# Patient Record
Sex: FEMALE | Race: WHITE | NOT HISPANIC OR LATINO | Employment: FULL TIME | URBAN - NONMETROPOLITAN AREA
[De-identification: names, ages, dates, MRNs, and addresses within clinical notes are randomized per-mention and may not be internally consistent; named-entity substitution may affect disease eponyms.]

---

## 2018-08-01 ENCOUNTER — TELEPHONE (OUTPATIENT)
Dept: OBGYN | Facility: OTHER | Age: 28
End: 2018-08-01

## 2018-08-01 DIAGNOSIS — O36.80X0 ENCOUNTER TO DETERMINE FETAL VIABILITY OF PREGNANCY, SINGLE OR UNSPECIFIED FETUS: Primary | ICD-10-CM

## 2018-08-17 ENCOUNTER — HOSPITAL ENCOUNTER (OUTPATIENT)
Dept: ULTRASOUND IMAGING | Facility: OTHER | Age: 28
Discharge: HOME | End: 2018-08-17
Attending: OBSTETRICS & GYNECOLOGY | Admitting: OBSTETRICS & GYNECOLOGY
Payer: COMMERCIAL

## 2018-08-17 ENCOUNTER — PRENATAL OFFICE VISIT (OUTPATIENT)
Dept: OBGYN | Facility: OTHER | Age: 28
End: 2018-08-17
Attending: OBSTETRICS & GYNECOLOGY
Payer: COMMERCIAL

## 2018-08-17 VITALS
BODY MASS INDEX: 20.46 KG/M2 | HEIGHT: 63 IN | SYSTOLIC BLOOD PRESSURE: 100 MMHG | HEART RATE: 68 BPM | DIASTOLIC BLOOD PRESSURE: 64 MMHG | WEIGHT: 115.5 LBS

## 2018-08-17 DIAGNOSIS — Z34.01 ENCOUNTER FOR SUPERVISION OF NORMAL FIRST PREGNANCY IN FIRST TRIMESTER: Primary | ICD-10-CM

## 2018-08-17 DIAGNOSIS — O36.80X0 ENCOUNTER TO DETERMINE FETAL VIABILITY OF PREGNANCY, SINGLE OR UNSPECIFIED FETUS: ICD-10-CM

## 2018-08-17 LAB
ABO + RH BLD: NORMAL
ABO + RH BLD: NORMAL
BLD GP AB SCN SERPL QL: NORMAL
BLOOD BANK CMNT PATIENT-IMP: NORMAL
C TRACH DNA SPEC QL PROBE+SIG AMP: NOT DETECTED
ERYTHROCYTE [DISTWIDTH] IN BLOOD BY AUTOMATED COUNT: 12.2 % (ref 10–15)
HCT VFR BLD AUTO: 43.6 % (ref 35–47)
HGB BLD-MCNC: 15.4 G/DL (ref 11.7–15.7)
MCH RBC QN AUTO: 31.6 PG (ref 26.5–33)
MCHC RBC AUTO-ENTMCNC: 35.3 G/DL (ref 31.5–36.5)
MCV RBC AUTO: 89 FL (ref 78–100)
N GONORRHOEA DNA SPEC QL PROBE+SIG AMP: NOT DETECTED
PLATELET # BLD AUTO: 236 10E9/L (ref 150–450)
RBC # BLD AUTO: 4.88 10E12/L (ref 3.8–5.2)
SPECIMEN EXP DATE BLD: NORMAL
SPECIMEN SOURCE: NORMAL
WBC # BLD AUTO: 10.7 10E9/L (ref 4–11)

## 2018-08-17 PROCEDURE — 36415 COLL VENOUS BLD VENIPUNCTURE: CPT | Performed by: OBSTETRICS & GYNECOLOGY

## 2018-08-17 PROCEDURE — 87086 URINE CULTURE/COLONY COUNT: CPT | Performed by: OBSTETRICS & GYNECOLOGY

## 2018-08-17 PROCEDURE — 86780 TREPONEMA PALLIDUM: CPT | Mod: XU | Performed by: OBSTETRICS & GYNECOLOGY

## 2018-08-17 PROCEDURE — 85027 COMPLETE CBC AUTOMATED: CPT | Performed by: OBSTETRICS & GYNECOLOGY

## 2018-08-17 PROCEDURE — 86850 RBC ANTIBODY SCREEN: CPT | Performed by: OBSTETRICS & GYNECOLOGY

## 2018-08-17 PROCEDURE — 86762 RUBELLA ANTIBODY: CPT | Performed by: OBSTETRICS & GYNECOLOGY

## 2018-08-17 PROCEDURE — 86780 TREPONEMA PALLIDUM: CPT | Performed by: OBSTETRICS & GYNECOLOGY

## 2018-08-17 PROCEDURE — 86901 BLOOD TYPING SEROLOGIC RH(D): CPT | Performed by: OBSTETRICS & GYNECOLOGY

## 2018-08-17 PROCEDURE — 87340 HEPATITIS B SURFACE AG IA: CPT | Performed by: OBSTETRICS & GYNECOLOGY

## 2018-08-17 PROCEDURE — 87591 N.GONORRHOEAE DNA AMP PROB: CPT | Performed by: OBSTETRICS & GYNECOLOGY

## 2018-08-17 PROCEDURE — 99207 ZZC OB VISIT-NO CHARGE - GICH ONLY: CPT | Performed by: OBSTETRICS & GYNECOLOGY

## 2018-08-17 PROCEDURE — 86592 SYPHILIS TEST NON-TREP QUAL: CPT | Performed by: OBSTETRICS & GYNECOLOGY

## 2018-08-17 PROCEDURE — 87491 CHLMYD TRACH DNA AMP PROBE: CPT | Performed by: OBSTETRICS & GYNECOLOGY

## 2018-08-17 PROCEDURE — 76801 OB US < 14 WKS SINGLE FETUS: CPT

## 2018-08-17 PROCEDURE — 86900 BLOOD TYPING SEROLOGIC ABO: CPT | Performed by: OBSTETRICS & GYNECOLOGY

## 2018-08-17 PROCEDURE — 87389 HIV-1 AG W/HIV-1&-2 AB AG IA: CPT | Performed by: OBSTETRICS & GYNECOLOGY

## 2018-08-17 RX ORDER — ONDANSETRON 4 MG/1
4 TABLET, ORALLY DISINTEGRATING ORAL EVERY 8 HOURS PRN
COMMUNITY
Start: 2018-08-06 | End: 2018-08-29

## 2018-08-17 RX ORDER — PYRIDOXINE HCL (VITAMIN B6) 25 MG
25 TABLET ORAL EVERY MORNING
Status: ON HOLD | COMMUNITY
Start: 2018-08-17 | End: 2019-03-31

## 2018-08-17 ASSESSMENT — PAIN SCALES - GENERAL: PAINLEVEL: NO PAIN (0)

## 2018-08-17 NOTE — NURSING NOTE
"Patient presents today for her OB Px. Her LMP was 6/18/18.  Melany Mac LPN  8/17/2018  2:20 PM           Chief Complaint   Patient presents with     Prenatal Care     OB Px LMP 6/18/18       Initial /64 (BP Location: Right arm, Patient Position: Sitting, Cuff Size: Adult Regular)  Pulse 68  Ht 1.6 m (5' 3\")  Wt 52.4 kg (115 lb 8 oz)  LMP 06/18/2018  BMI 20.46 kg/m2 Estimated body mass index is 20.46 kg/(m^2) as calculated from the following:    Height as of this encounter: 1.6 m (5' 3\").    Weight as of this encounter: 52.4 kg (115 lb 8 oz).  Medication Reconciliation: complete    Melany Mac LPN    "

## 2018-08-17 NOTE — PROGRESS NOTES
New Obstetrics Visit    HPI: 27 year old  at 7w3d by 7w3d US here today for initial OB visit. This was a somewhat planned pregnancy. She had a positive pregnancy test in , then 3 days later had a negative test and started having cramping and bleeding. She had no menses after. She has been nauseated for the past week. She is taking unisom at night, B6 once in the morning, and zofran prn. The zofran is making her constipated. She denies cramping or VB.     OBHx  Obstetric History       T0      L0     SAB0   TAB0   Ectopic0   Multiple0   Live Births0       # Outcome Date GA Lbr Cesar/2nd Weight Sex Delivery Anes PTL Lv   1 Current                   GYN History  Last pap in , reportedly normal  Last annual exam with breast exam 2017- normal    PMHx: History reviewed. No pertinent past medical history.   PSHx:   Past Surgical History:   Procedure Laterality Date     BUNIONECTOMY       SINUS SURGERY        Meds:   Current Outpatient Prescriptions   Medication     doxylamine (UNISOM) 25 MG TABS tablet     ondansetron (ZOFRAN-ODT) 4 MG ODT tab     Prenatal Vit-Fe Fumarate-FA (GNP PRENATAL VITAMINS) 28-0.8 MG TABS     pyridOXINE (VITAMIN  B-6) 25 MG tablet     No current facility-administered medications for this visit.      Allergies:     Allergies   Allergen Reactions     Quasense Itching       SocHx:   Social History   Substance Use Topics     Smoking status: Never Smoker     Smokeless tobacco: Never Used     Alcohol use No     Lives with her  Will. 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 Is finishing her bachelors in nursing in October.    FamHx:   Family History   Problem Relation Age of Onset     Other - See Comments Mother      colitis     Breast Cancer Maternal Grandmother      late 50s        ROS: 10-Point ROS negative except as noted in HPI      Physical Exam  /64 (BP Location: Right arm, Patient Position: Sitting, Cuff Size: Adult  "Regular)  Pulse 68  Ht 1.6 m (5' 3\")  Wt 52.4 kg (115 lb 8 oz)  LMP 2018  BMI 20.46 kg/m2  Body mass index is 20.46 kg/(m^2).  Gen: Well-appearing, NAD  HEENT: Normocephalic, atraumatic  Neck: Thyroid is not enlarged, no appreciable masses palpated. Non-tender  CV:  RRR, no m/r/g auscultated  Pulm: CTAB, no w/r/r auscultated  Abd: Soft, non-tender, non-distended  Ext: No LE edema, extremities warm and well perfused    Pelvic:  Normal appearing external female genitalia. No vaginal lesions. Minimal white discharge. Cervix normal, no lesions. Uterus is 8wk size, mobile, non-tender, anteverted. No adnexal tenderness or masses    Assessment/Plan:  Ms. Gerda Hill is a 27 year old  at 7w3d by 7w3d US, here for new OB visit.  1. Nausea, vomiting: discussed lifestyle modifications, unisom, B6, zofran. Offered Reglan, declines for now but will call if she needs something additional  2. Constipation: discussed miralax, colace  3. New OB labs ord'd today  4. Genetics: discussed options today, they are undecided. Will review at next visit    Follow up in 4 weeks.    Lizeth Langston MD  OB/GYN  2018 3:10 PM     "

## 2018-08-17 NOTE — MR AVS SNAPSHOT
"              After Visit Summary   8/17/2018    Gerda Hill    MRN: [id number]           Patient Information     Date Of Birth          1990        Visit Information        Provider Department      8/17/2018 3:00 PM Lizeth Langston MD [facility]        Today's Diagnoses     Encounter for supervision of normal first pregnancy in first trimester    -  1       Follow-ups after your visit        Your next 10 appointments already scheduled     Sep 11, 2018  9:00 AM CDT   ESTABLISHED PRENATAL with Lizeth Langston MD   [facility]    [address]  [place] [address] [phone]              Who to contact     If you have questions or need follow up information about today's clinic visit or your schedule please contact [facility] directly at [phone].  Normal or non-critical lab and imaging results will be communicated to you by [employer], letter or phone within 4 business days after the clinic has received the results. If you do not hear from us within 7 days, please contact the clinic through [employer] or phone. If you have a critical or abnormal lab result, we will notify you by phone as soon as possible.  Submit refill requests through [employer] or call your pharmacy and they will forward the refill request to us. Please allow 3 business days for your refill to be completed.          Additional Information About Your Visit        MyChart Information     [employer] lets you send messages to your doctor, view your test results, renew your prescriptions, schedule appointments and more. To sign up, go to [email or web address] . Click on \"Log in\" on the left side of the screen, which will take you to the Welcome page. Then click on \"Sign up Now\" on the right side of the page.     You will be asked to enter the access code listed below, as well as some personal information. Please follow the " "directions to create your username and password.     Your access code is: [email or web address]  Expires: 11/15/2018  1:20 PM     Your access code will  in 90 days. If you need help or a new code, please call your [facility] clinic or [phone].        Care EveryWhere ID     This is your Care EveryWhere ID. This could be used by other organizations to access your [facility] medical records  [id number]        Your Vitals Were     Pulse Height Last Period BMI (Body Mass Index)          68 1.6 m (5' 3\") 2018 20.46 kg/m2         Blood Pressure from Last 3 Encounters:   18 100/64    Weight from Last 3 Encounters:   18 52.4 kg (115 lb 8 oz)              We Performed the Following     ABO/Rh type and screen     CBC with platelets     GC/Chlamydia by PCR - HI,GH     Hepatitis B surface antigen     HIV Antigen Antibody Combo     Rubella Antibody IgG Quantitative     Treponema Abs w Reflex to RPR and Titer     Urine Culture Aerobic Bacterial        Primary Care Provider Fax #    Physician No Ref-Primary [phone]       No address on file        Equal Access to Services     TYLER BARKSDALE : Hadii edenilson ku yani Sofia, waaxda luluke, qaybta kaalmamargaret sainz, raad ash . So [facility] [phone].    ATENCIÓN: Si habla español, tiene a sheets disposición servicios gratuitos de asistencia lingüística. Llame al [phone].    We comply with applicable federal civil rights laws and [place] laws. We do not discriminate on the basis of race, color, national origin, age, disability, sex, sexual orientation, or gender identity.            Thank you!     Thank you for choosing [facility] AND [facility]  for your care. Our goal is always to provide you with excellent care. Hearing back from our patients is one way we can continue to improve our services. Please take a few minutes to complete the written survey that you may receive in the mail after your visit with us. Thank you!   "           Your Updated Medication List - Protect others around you: Learn how to safely use, store and throw away your medicines at www.disposemymeds.org.          This list is accurate as of 8/17/18  3:33 PM.  Always use your most recent med list.                   Brand Name Dispense Instructions for use Diagnosis    GNP PRENATAL VITAMINS 28-0.8 MG Tabs      Take 1 tablet by mouth daily        ondansetron 4 MG ODT tab    ZOFRAN-ODT     Place 4 mg under the tongue every 8 hours as needed        pyridOXINE 25 MG tablet    vitamin B-6     Take 1 tablet (25 mg) by mouth every morning        UNISOM 25 MG Tabs tablet   Generic drug:  doxylamine      Take 1 tablet (25 mg) by mouth At Bedtime

## 2018-08-18 ASSESSMENT — PATIENT HEALTH QUESTIONNAIRE - PHQ9: SUM OF ALL RESPONSES TO PHQ QUESTIONS 1-9: 0

## 2018-08-20 LAB
BACTERIA SPEC CULT: NORMAL
SPECIMEN SOURCE: NORMAL

## 2018-08-21 LAB
HBV SURFACE AG SERPL QL IA: NONREACTIVE
HIV 1+2 AB+HIV1 P24 AG SERPL QL IA: NONREACTIVE
RPR SER QL: NONREACTIVE
RUBV IGG SERPL IA-ACNC: 41 IU/ML
T PALLIDUM AB SER QL: ABNORMAL

## 2018-08-22 ENCOUNTER — MYC MEDICAL ADVICE (OUTPATIENT)
Dept: OBGYN | Facility: OTHER | Age: 28
End: 2018-08-22

## 2018-08-22 DIAGNOSIS — O21.9 NAUSEA/VOMITING IN PREGNANCY: Primary | ICD-10-CM

## 2018-08-22 LAB — T PALLIDUM AB SER QL AGGL: NON REACTIVE

## 2018-08-22 RX ORDER — ONDANSETRON 4 MG/1
4 TABLET, FILM COATED ORAL EVERY 6 HOURS PRN
Qty: 30 TABLET | Refills: 1 | Status: SHIPPED | OUTPATIENT
Start: 2018-08-22 | End: 2019-05-10

## 2018-08-29 ENCOUNTER — OFFICE VISIT (OUTPATIENT)
Dept: FAMILY MEDICINE | Facility: OTHER | Age: 28
End: 2018-08-29
Attending: NURSE PRACTITIONER
Payer: COMMERCIAL

## 2018-08-29 ENCOUNTER — PRENATAL OFFICE VISIT (OUTPATIENT)
Dept: OBGYN | Facility: OTHER | Age: 28
End: 2018-08-29
Attending: OBSTETRICS & GYNECOLOGY
Payer: COMMERCIAL

## 2018-08-29 VITALS
DIASTOLIC BLOOD PRESSURE: 80 MMHG | SYSTOLIC BLOOD PRESSURE: 100 MMHG | BODY MASS INDEX: 20.24 KG/M2 | WEIGHT: 114.25 LBS | HEART RATE: 88 BPM

## 2018-08-29 VITALS
WEIGHT: 116 LBS | SYSTOLIC BLOOD PRESSURE: 100 MMHG | TEMPERATURE: 96.7 F | BODY MASS INDEX: 20.55 KG/M2 | HEART RATE: 88 BPM | DIASTOLIC BLOOD PRESSURE: 80 MMHG

## 2018-08-29 DIAGNOSIS — O21.9 NAUSEA/VOMITING IN PREGNANCY: ICD-10-CM

## 2018-08-29 DIAGNOSIS — R30.0 DYSURIA: Primary | ICD-10-CM

## 2018-08-29 DIAGNOSIS — O21.9 NAUSEA AND VOMITING IN PREGNANCY: Primary | ICD-10-CM

## 2018-08-29 LAB
ALBUMIN UR-MCNC: NEGATIVE MG/DL
APPEARANCE UR: CLEAR
BACTERIA #/AREA URNS HPF: ABNORMAL /HPF
BILIRUB UR QL STRIP: NEGATIVE
COLOR UR AUTO: YELLOW
GLUCOSE UR STRIP-MCNC: 100 MG/DL
HGB UR QL STRIP: NEGATIVE
KETONES UR STRIP-MCNC: NEGATIVE MG/DL
LEUKOCYTE ESTERASE UR QL STRIP: NEGATIVE
NITRATE UR QL: NEGATIVE
NON-SQ EPI CELLS #/AREA URNS LPF: ABNORMAL /LPF
PH UR STRIP: 6 PH (ref 5–9)
RBC #/AREA URNS AUTO: ABNORMAL /HPF
SOURCE: ABNORMAL
SP GR UR STRIP: 1.02 (ref 1–1.03)
UROBILINOGEN UR STRIP-ACNC: 0.2 EU/DL (ref 0.2–1)
WBC #/AREA URNS AUTO: ABNORMAL /HPF

## 2018-08-29 PROCEDURE — 99207 ZZC OB VISIT-NO CHARGE - GICH ONLY: CPT | Performed by: OBSTETRICS & GYNECOLOGY

## 2018-08-29 PROCEDURE — 99213 OFFICE O/P EST LOW 20 MIN: CPT | Performed by: NURSE PRACTITIONER

## 2018-08-29 PROCEDURE — 81001 URINALYSIS AUTO W/SCOPE: CPT | Performed by: NURSE PRACTITIONER

## 2018-08-29 RX ORDER — METOCLOPRAMIDE 10 MG/1
10 TABLET ORAL EVERY 6 HOURS PRN
Qty: 30 TABLET | Refills: 1 | Status: SHIPPED | OUTPATIENT
Start: 2018-08-29 | End: 2018-09-11

## 2018-08-29 RX ORDER — PROMETHAZINE HYDROCHLORIDE 25 MG/1
25 SUPPOSITORY RECTAL EVERY 12 HOURS PRN
Qty: 20 SUPPOSITORY | Refills: 1 | Status: ON HOLD | OUTPATIENT
Start: 2018-08-29 | End: 2019-03-31

## 2018-08-29 ASSESSMENT — ENCOUNTER SYMPTOMS
FREQUENCY: 1
NAUSEA: 1
VOMITING: 1

## 2018-08-29 ASSESSMENT — PAIN SCALES - GENERAL
PAINLEVEL: NO PAIN (0)
PAINLEVEL: NO PAIN (0)

## 2018-08-29 NOTE — NURSING NOTE
Patient is here today with c/o frequency and cloudy urine. She said her symptoms started about 2 days ago. Juanita Landon LPN......................8/29/2018 9:51 AM

## 2018-08-29 NOTE — MR AVS SNAPSHOT
After Visit Summary   8/29/2018    Gerda Hill    MRN: [id number]           Patient Information     Date Of Birth          1990        Visit Information        Provider Department      8/29/2018 9:45 AM Cindy Tomas APRN CNP [facility]        Today's Diagnoses     Dysuria    -  1    Nausea/vomiting in pregnancy           Follow-ups after your visit        Follow-up notes from your care team     Return if symptoms worsen or fail to improve.      Your next 10 appointments already scheduled     Sep 05, 2018  9:30 AM CDT   ESTABLISHED PRENATAL with Lizeth Langston MD   [facility]    [address]  [place] [address]   [phone]            Sep 11, 2018  9:00 AM CDT   ESTABLISHED PRENATAL with Lizeth Langston MD   [facility]    [address]  [place] [address]   [phone]              Who to contact     If you have questions or need follow up information about today's clinic visit or your schedule please contact [facility] directly at [phone].  Normal or non-critical lab and imaging results will be communicated to you by [employer], letter or phone within 4 business days after the clinic has received the results. If you do not hear from us within 7 days, please contact the clinic through [employer] or phone. If you have a critical or abnormal lab result, we will notify you by phone as soon as possible.  Submit refill requests through [employer] or call your pharmacy and they will forward the refill request to us. Please allow 3 business days for your refill to be completed.          Additional Information About Your Visit        [employer] Information     [employer] gives you secure access to your electronic health record. If you see a primary care provider, you can also send messages to your care team and  make appointments. If you have questions, please call your primary care clinic.  If you do not have a primary care provider, please call [phone] and they will assist you.        Care EveryWhere ID     This is your Care EveryWhere ID. This could be used by other organizations to access your [facility] medical records  [id number]        Your Vitals Were     Pulse Temperature Last Period BMI (Body Mass Index)          88 96.7  F (35.9  C) (Temporal) 06/18/2018 20.55 kg/m2         Blood Pressure from Last 3 Encounters:   08/29/18 100/80   08/29/18 100/80   08/17/18 100/64    Weight from Last 3 Encounters:   08/29/18 114 lb 4 oz (51.8 kg)   08/29/18 116 lb (52.6 kg)   08/17/18 115 lb 8 oz (52.4 kg)              We Performed the Following     *UA reflex to Microscopic     Urine Microscopic          Today's Medication Changes          These changes are accurate as of 8/29/18  6:45 PM.  If you have any questions, ask your nurse or doctor.               Start taking these medicines.        Dose/Directions    metoclopramide 10 MG tablet   Commonly known as:  REGLAN   Used for:  Nausea and vomiting in pregnancy   Started by:  Lizeth Langston MD        Dose:  10 mg   Take 1 tablet (10 mg) by mouth every 6 hours as needed   Quantity:  30 tablet   Refills:  1       promethazine 25 MG Suppository   Commonly known as:  PHENERGAN   Used for:  Nausea and vomiting in pregnancy   Started by:  Lizeth Langston MD        Dose:  25 mg   Place 1 suppository (25 mg) rectally every 12 hours as needed for nausea   Quantity:  20 suppository   Refills:  1       ranitidine 150 MG tablet   Commonly known as:  ZANTAC   Used for:  Nausea and vomiting in pregnancy   Started by:  Lizeth Langston MD        Dose:  150 mg   Take 1 tablet (150 mg) by mouth 2 times daily   Quantity:  100 tablet   Refills:  3            Where to get your medicines      [facility] [address] [place] [address]  ST [address]  [address],  [facility] [address]     Phone:  [phone]     metoclopramide 10 MG tablet    promethazine 25 MG Suppository    ranitidine 150 MG tablet                Primary Care Provider Fax #    Physician No Ref-Primary [phone]       No address on file        Equal Access to Services     TYRELL BARKSDALE : Hadii aad ku hadasho Soomaali, waaxda luqadaha, qaybta kaalmada adeegyada, raad thakkarin hayaan adedenise kharash laeber . So [facility] [phone].    ATENCIÓN: Si habla español, tiene a sheets disposición servicios gratuitos de asistencia lingüística. Llame al [phone].    We comply with applicable federal civil rights laws and [place] laws. We do not discriminate on the basis of race, color, national origin, age, disability, sex, sexual orientation, or gender identity.            Thank you!     Thank you for choosing [facility] AND [facility]  for your care. Our goal is always to provide you with excellent care. Hearing back from our patients is one way we can continue to improve our services. Please take a few minutes to complete the written survey that you may receive in the mail after your visit with us. Thank you!             Your Updated Medication List - Protect others around you: Learn how to safely use, store and throw away your medicines at www.disposemymeds.org.          This list is accurate as of 8/29/18  6:45 PM.  Always use your most recent med list.                   Brand Name Dispense Instructions for use Diagnosis    GNP PRENATAL VITAMINS 28-0.8 MG Tabs      Take 1 tablet by mouth daily        metoclopramide 10 MG tablet    REGLAN    30 tablet    Take 1 tablet (10 mg) by mouth every 6 hours as needed    Nausea and vomiting in pregnancy       ondansetron 4 MG tablet    ZOFRAN    30 tablet    Take 1 tablet (4 mg) by mouth every 6 hours as needed for nausea    Nausea/vomiting in pregnancy       promethazine 25 MG Suppository    PHENERGAN    20 suppository     Place 1 suppository (25 mg) rectally every 12 hours as needed for nausea    Nausea and vomiting in pregnancy       pyridOXINE 25 MG tablet    vitamin B-6     Take 1 tablet (25 mg) by mouth every morning        ranitidine 150 MG tablet    ZANTAC    100 tablet    Take 1 tablet (150 mg) by mouth 2 times daily    Nausea and vomiting in pregnancy       UNISOM 25 MG Tabs tablet   Generic drug:  doxylamine      Take 1 tablet (25 mg) by mouth At Bedtime

## 2018-08-29 NOTE — PROGRESS NOTES
Return OB Visit    S: Patient presents today for an acute visit for worsening nausea and vomiting. She was last seen 2 weeks ago, and reports that since that time her symptoms are getting much worse. She is taking zofran twice a day but doesn't feel like it is helping. She is vomiting a few times per day but is always nauseated. She is able to tolerate some fluids. Has been eating mostly pedialyte popsicles, which are going okay. She is not eating much. She has noticed her mood is getting worse because of how ill she feels.    O: /80 (BP Location: Right arm, Patient Position: Sitting, Cuff Size: Adult Regular)  Pulse 88  Wt 51.8 kg (114 lb 4 oz)  LMP 2018  BMI 20.24 kg/m2  Gen: Ill appearing but NAD  See OB Flowsheet    A/P:  Gerda Hill is a 27 year old  at 9w1d by 7w3d US, here for nausea and vomiting in pregnancy.  Nausea/vomiting: weight fairly stable and vitals all normal. discussed continuing zofran, B6 and unisom, adding phenergan suppositories and reglan. Discussed addition of IV infusions if not improving in the next week.     PNC: Rh positive, Rubella immune  Genetics: undecided, will review at future appointment  Imaging: dating US at 7w3d  Immunizations: none  RTC 1 week for symptom recheck    Lizeth Langston MD  OB/GYN  2018 12:38 PM

## 2018-08-29 NOTE — PROGRESS NOTES
SUBJECTIVE:   Gerda Hill is a 27 year old female who presents to clinic today for the following health issues:    Presents for a couple of days of urinary frequency, denies any dysuria, hematuria, abdominal pain or flank pain--denies any vaginal discharge--- does not feel like past bladder infections  Feels the frequency may be due to her pregnancy  Has been trying to take small amounts of fluids regularly however has persistent nausea, 9 weeks gestation  Was seen a couple of weeks ago by her OB/GYN given a prescription for Zofran which she feels is minimally effective has been taking it at least twice a day regularly--feels she has lost a couple of pounds  On her scale at home  Has become depressed with the nausea--trying Zofran, vitamin B6 does not feel this is working  Feels nauseous all the time    HPI    There is no problem list on file for this patient.    Past Surgical History:   Procedure Laterality Date     BUNIONECTOMY       SINUS SURGERY         Social History   Substance Use Topics     Smoking status: Never Smoker     Smokeless tobacco: Never Used     Alcohol use No     Family History   Problem Relation Age of Onset     Other - See Comments Mother      colitis     Breast Cancer Maternal Grandmother      late 50s         Current Outpatient Prescriptions   Medication Sig Dispense Refill     doxylamine (UNISOM) 25 MG TABS tablet Take 1 tablet (25 mg) by mouth At Bedtime       ondansetron (ZOFRAN) 4 MG tablet Take 1 tablet (4 mg) by mouth every 6 hours as needed for nausea 30 tablet 1     Prenatal Vit-Fe Fumarate-FA (GNP PRENATAL VITAMINS) 28-0.8 MG TABS Take 1 tablet by mouth daily       pyridOXINE (VITAMIN  B-6) 25 MG tablet Take 1 tablet (25 mg) by mouth every morning       metoclopramide (REGLAN) 10 MG tablet Take 1 tablet (10 mg) by mouth every 6 hours as needed 30 tablet 1     promethazine (PHENERGAN) 25 MG Suppository Place 1 suppository (25 mg) rectally every 12 hours as needed for nausea 20  suppository 1     ranitidine (ZANTAC) 150 MG tablet Take 1 tablet (150 mg) by mouth 2 times daily 100 tablet 3     Allergies   Allergen Reactions     Quasense Itching     BP Readings from Last 3 Encounters:   08/29/18 100/80   08/29/18 100/80   08/17/18 100/64    Wt Readings from Last 3 Encounters:   08/29/18 114 lb 4 oz (51.8 kg)   08/29/18 116 lb (52.6 kg)   08/17/18 115 lb 8 oz (52.4 kg)                  Labs reviewed in EPIC    Review of Systems   Gastrointestinal: Positive for nausea and vomiting.   Genitourinary: Positive for frequency.   All other systems reviewed and are negative.       OBJECTIVE:     /80 (BP Location: Right arm, Patient Position: Sitting, Cuff Size: Adult Regular)  Pulse 88  Temp 96.7  F (35.9  C) (Temporal)  Wt 116 lb (52.6 kg)  LMP 06/18/2018  BMI 20.55 kg/m2  Body mass index is 20.55 kg/(m^2).  Physical Exam   Constitutional: She is oriented to person, place, and time. She appears well-developed. No distress.   Cardiovascular: Normal rate.    Pulmonary/Chest: Effort normal.   Musculoskeletal: Normal range of motion.   Neurological: She is alert and oriented to person, place, and time.   Skin: Skin is warm and dry.   Psychiatric: Her behavior is normal. Judgment and thought content normal.   Depressed crying intermittently   Nursing note and vitals reviewed.      Results for orders placed or performed in visit on 08/29/18   *UA reflex to Microscopic   Result Value Ref Range    Color Urine Yellow     Appearance Urine Clear     Glucose Urine 100 (A) NEG^Negative mg/dL    Bilirubin Urine Negative NEG^Negative    Ketones Urine Negative NEG^Negative mg/dL    Specific Gravity Urine 1.025 1.000 - 1.030    Blood Urine Negative NEG^Negative    pH Urine 6.0 5.0 - 9.0 pH    Protein Albumin Urine Negative NEG^Negative mg/dL    Urobilinogen Urine 0.2 0.2 - 1.0 EU/dL    Nitrite Urine Negative NEG^Negative    Leukocyte Esterase Urine Negative NEG^Negative    Source Midstream Urine    Urine  Microscopic   Result Value Ref Range    WBC Urine 0 - 5 OTO5^0 - 5 /HPF    RBC Urine O - 2 OTO2^O - 2 /HPF    Squamous Epithelial /LPF Urine Few FEW^Few /LPF    Bacteria Urine Moderate (A) NEG^Negative /HPF         ASSESSMENT/PLAN:     Reviewed UA results no apparent evidence of UTI--- only symptom is frequency  Likely due to pregnancy    Mood generally depressed from chronic persistent nausea    Transferred over to OB GYN primary for follow-up today    1. Dysuria    - *UA reflex to Microscopic  - Urine Microscopic    2. Nausea/vomiting in pregnancy          RAMIRO Espinosa [facility] [place]

## 2018-08-29 NOTE — MR AVS SNAPSHOT
After Visit Summary   8/29/2018    Gerda Hill    MRN: [id number]           Patient Information     Date Of Birth          1990        Visit Information        Provider Department      8/29/2018 10:30 AM Lizeth Langston MD         Today's Diagnoses     Nausea and vomiting in pregnancy    -  1       Follow-ups after your visit        Your next 10 appointments already scheduled     Sep 05, 2018  9:30 AM CDT   ESTABLISHED PRENATAL with Lizeth Langston MD    ()    [address]  [place] [address]   [phone]            Sep 11, 2018  9:00 AM CDT   ESTABLISHED PRENATAL with Lizeth Langston MD    ()    [address]  [place] [address]   [phone]              Who to contact     If you have questions or need follow up information about today's clinic visit or your schedule please contact [facility] directly at [phone].  Normal or non-critical lab and imaging results will be communicated to you by [employer], letter or phone within 4 business days after the clinic has received the results. If you do not hear from us within 7 days, please contact the clinic through Mobii or phone. If you have a critical or abnormal lab result, we will notify you by phone as soon as possible.  Submit refill requests through Mobii or call your pharmacy and they will forward the refill request to us. Please allow 3 business days for your refill to be completed.          Additional Information About Your Visit        [employer] Information     Mobii gives you secure access to your electronic health record. If you see a primary care provider, you can also send messages to your care team and make appointments. If you have questions, please call your primary care clinic.  If you do not have a  primary care provider, please call [phone] and they will assist you.        Care EveryWhere ID     This is your Care EveryWhere ID. This could be used by other organizations to access your [facility] medical records  [id number]        Your Vitals Were     Pulse Last Period BMI (Body Mass Index)             88 06/18/2018 20.24 kg/m2          Blood Pressure from Last 3 Encounters:   08/29/18 100/80   08/29/18 100/80   08/17/18 100/64    Weight from Last 3 Encounters:   08/29/18 51.8 kg (114 lb 4 oz)   08/29/18 52.6 kg (116 lb)   08/17/18 52.4 kg (115 lb 8 oz)              Today, you had the following     No orders found for display         Today's Medication Changes          These changes are accurate as of 8/29/18 11:08 AM.  If you have any questions, ask your nurse or doctor.               Start taking these medicines.        Dose/Directions    metoclopramide 10 MG tablet   Commonly known as:  REGLAN   Used for:  Nausea and vomiting in pregnancy   Started by:  Lizeth Langston MD        Dose:  10 mg   Take 1 tablet (10 mg) by mouth every 6 hours as needed   Quantity:  30 tablet   Refills:  1       promethazine 25 MG Suppository   Commonly known as:  PHENERGAN   Used for:  Nausea and vomiting in pregnancy   Started by:  Lizeth Langston MD        Dose:  25 mg   Place 1 suppository (25 mg) rectally every 12 hours as needed for nausea   Quantity:  20 suppository   Refills:  1       ranitidine 150 MG tablet   Commonly known as:  ZANTAC   Used for:  Nausea and vomiting in pregnancy   Started by:  Lizeth Langston MD        Dose:  150 mg   Take 1 tablet (150 mg) by mouth 2 times daily   Quantity:  100 tablet   Refills:  3            Where to get your medicines      These medications were sent to [id number] - [place], [place] - [address] OF  [address], [facility] [address]     Phone:  [phone]     metoclopramide 10 MG tablet    promethazine 25 MG  Suppository    ranitidine 150 MG tablet                Primary Care Provider Fax #    Physician No Ref-Primary [phone]       No address on file        Equal Access to Services     TYRELL BARKSDALE : Hadii aad ku hadblair Sofia, jocelyn eleonoraartie, chao sainz, raad vincent. So [facility] [phone].    ATENCIÓN: Si habla español, tiene a sheets disposición servicios gratuitos de asistencia lingüística. Llame al [phone].    We comply with applicable federal civil rights laws and [place] laws. We do not discriminate on the basis of race, color, national origin, age, disability, sex, sexual orientation, or gender identity.            Thank you!     Thank you for choosing [facility] AND [facility]  for your care. Our goal is always to provide you with excellent care. Hearing back from our patients is one way we can continue to improve our services. Please take a few minutes to complete the written survey that you may receive in the mail after your visit with us. Thank you!             Your Updated Medication List - Protect others around you: Learn how to safely use, store and throw away your medicines at www.disposemymeds.org.          This list is accurate as of 8/29/18 11:08 AM.  Always use your most recent med list.                   Brand Name Dispense Instructions for use Diagnosis    GNP PRENATAL VITAMINS 28-0.8 MG Tabs      Take 1 tablet by mouth daily        metoclopramide 10 MG tablet    REGLAN    30 tablet    Take 1 tablet (10 mg) by mouth every 6 hours as needed    Nausea and vomiting in pregnancy       ondansetron 4 MG tablet    ZOFRAN    30 tablet    Take 1 tablet (4 mg) by mouth every 6 hours as needed for nausea    Nausea/vomiting in pregnancy       promethazine 25 MG Suppository    PHENERGAN    20 suppository    Place 1 suppository (25 mg) rectally every 12 hours as needed for nausea    Nausea and vomiting in pregnancy       pyridOXINE 25 MG tablet     vitamin B-6     Take 1 tablet (25 mg) by mouth every morning        ranitidine 150 MG tablet    ZANTAC    100 tablet    Take 1 tablet (150 mg) by mouth 2 times daily    Nausea and vomiting in pregnancy       UNISOM 25 MG Tabs tablet   Generic drug:  doxylamine      Take 1 tablet (25 mg) by mouth At Bedtime

## 2018-08-29 NOTE — NURSING NOTE
"Patient presents today as she is not feeling well. She has been very nauseas and is having a hard time keeping things down.  Melany Mac LPN  8/29/2018  10:30 AM           Chief Complaint   Patient presents with     Prenatal Care     f/u nausea, 9w1d       Initial /80 (BP Location: Right arm, Patient Position: Sitting, Cuff Size: Adult Regular)  Pulse 88  Wt 51.8 kg (114 lb 4 oz)  LMP 06/18/2018  BMI 20.24 kg/m2 Estimated body mass index is 20.24 kg/(m^2) as calculated from the following:    Height as of 8/17/18: 1.6 m (5' 3\").    Weight as of this encounter: 51.8 kg (114 lb 4 oz).  Medication Reconciliation: complete    Melany Mac LPN    "

## 2018-09-05 ENCOUNTER — PRENATAL OFFICE VISIT (OUTPATIENT)
Dept: OBGYN | Facility: OTHER | Age: 28
End: 2018-09-05
Attending: OBSTETRICS & GYNECOLOGY
Payer: COMMERCIAL

## 2018-09-05 VITALS
SYSTOLIC BLOOD PRESSURE: 98 MMHG | WEIGHT: 117.8 LBS | BODY MASS INDEX: 20.87 KG/M2 | DIASTOLIC BLOOD PRESSURE: 66 MMHG | RESPIRATION RATE: 16 BRPM | HEART RATE: 62 BPM

## 2018-09-05 DIAGNOSIS — O21.9 NAUSEA AND VOMITING IN PREGNANCY: Primary | ICD-10-CM

## 2018-09-05 PROCEDURE — 99207 ZZC OB VISIT-NO CHARGE - GICH ONLY: CPT | Performed by: OBSTETRICS & GYNECOLOGY

## 2018-09-05 ASSESSMENT — PAIN SCALES - GENERAL: PAINLEVEL: NO PAIN (0)

## 2018-09-05 NOTE — MR AVS SNAPSHOT
After Visit Summary   9/5/2018    Gerda Hill    MRN: [id number]           Patient Information     Date Of Birth          1990        Visit Information        Provider Department      9/5/2018 9:30 AM Lizeth Langston MD [facility]        Today's Diagnoses     Nausea and vomiting in pregnancy    -  1       Follow-ups after your visit        Your next 10 appointments already scheduled     Sep 05, 2018  9:30 AM CDT   ESTABLISHED PRENATAL with Lizeth Langston MD   [facility] ([facility])    [address]  [place] [address]   [phone]            Sep 11, 2018  9:00 AM CDT   ESTABLISHED PRENATAL with Lizeth Langston MD   [facility] ([facility])    [address]  [place] [address]   [phone]              Who to contact     If you have questions or need follow up information about today's clinic visit or your schedule please contact [facility] directly at [phone].  Normal or non-critical lab and imaging results will be communicated to you by [employer], letter or phone within 4 business days after the clinic has received the results. If you do not hear from us within 7 days, please contact the clinic through Bubbli or phone. If you have a critical or abnormal lab result, we will notify you by phone as soon as possible.  Submit refill requests through Bubbli or call your pharmacy and they will forward the refill request to us. Please allow 3 business days for your refill to be completed.          Additional Information About Your Visit        [employer] Information     Bubbli gives you secure access to your electronic health record. If you see a primary care provider, you can also send messages to your care team and make appointments. If you have questions, please call your primary care clinic.  If you do not have a  primary care provider, please call [phone] and they will assist you.        Care EveryWhere ID     This is your Care EveryWhere ID. This could be used by other organizations to access your [facility] medical records  [id number]        Your Vitals Were     Pulse Respirations Last Period BMI (Body Mass Index)          62 16 06/18/2018 20.87 kg/m2         Blood Pressure from Last 3 Encounters:   09/05/18 98/66   08/29/18 100/80   08/29/18 100/80    Weight from Last 3 Encounters:   09/05/18 53.4 kg (117 lb 12.8 oz)   08/29/18 51.8 kg (114 lb 4 oz)   08/29/18 52.6 kg (116 lb)              Today, you had the following     No orders found for display       Primary Care Provider Fax #    Physician No Ref-Primary [phone]       No address on file        Equal Access to Services     [facility]: Caity Sofia, jocelyn villasenor, chao sainz, raad ash . So [facility] [phone].    ATENCIÓN: Si habla español, tiene a sheets disposición servicios gratuitos de asistencia lingüística. Llame al [phone].    We comply with applicable federal civil rights laws and [place] laws. We do not discriminate on the basis of race, color, national origin, age, disability, sex, sexual orientation, or gender identity.            Thank you!     Thank you for choosing [facility]  for your care. Our goal is always to provide you with excellent care. Hearing back from our patients is one way we can continue to improve our services. Please take a few minutes to complete the written survey that you may receive in the mail after your visit with us. Thank you!             Your Updated Medication List - Protect others around you: Learn how to safely use, store and throw away your medicines at www.disposemymeds.org.          This list is accurate as of 9/5/18  9:09 AM.  Always use your most recent med list.                   Brand Name Dispense Instructions for use  Diagnosis    GNP PRENATAL VITAMINS 28-0.8 MG Tabs      Take 1 tablet by mouth daily        metoclopramide 10 MG tablet    REGLAN    30 tablet    Take 1 tablet (10 mg) by mouth every 6 hours as needed    Nausea and vomiting in pregnancy       ondansetron 4 MG tablet    ZOFRAN    30 tablet    Take 1 tablet (4 mg) by mouth every 6 hours as needed for nausea    Nausea/vomiting in pregnancy       promethazine 25 MG Suppository    PHENERGAN    20 suppository    Place 1 suppository (25 mg) rectally every 12 hours as needed for nausea    Nausea and vomiting in pregnancy       pyridOXINE 25 MG tablet    vitamin B-6     Take 1 tablet (25 mg) by mouth every morning        ranitidine 150 MG tablet    ZANTAC    100 tablet    Take 1 tablet (150 mg) by mouth 2 times daily    Nausea and vomiting in pregnancy       UNISOM 25 MG Tabs tablet   Generic drug:  doxylamine      Take 1 tablet (25 mg) by mouth At Bedtime

## 2018-09-05 NOTE — PROGRESS NOTES
Return OB Visit    S: Patient is feeling much better. Is taking phenergan suppositories, reglan every 6 hours and zofran as needed. She is now able to hydrate better. Was able to do her crossfit workout yesterday and is participating in a competition this weekend    O: BP 98/66 (BP Location: Right arm, Patient Position: Sitting, Cuff Size: Adult Regular)  Pulse 62  Resp 16  Wt 53.4 kg (117 lb 12.8 oz)  LMP 2018  BMI 20.87 kg/m2  Gen: Well-appearing, NAD  See OB Flowsheet    A/P:  Gerda Hlil is a 27 year old  at 10w1d by 7w3d US, here for return OB visit.  Nausea/vomiting: taking unisom/B6, zofran, reglan, phenergan. Improving      PNC: Rh positive, Rubella immune  Genetics: desires- will do at 16-18 weeks  Imaging: dating US at 7w3d  Immunizations: none  RTC 1 week for symptom recheck    Lizeth Langston MD  OB/GYN  2018 8:53 AM

## 2018-09-05 NOTE — NURSING NOTE
"Patient presents to theclinic for Ob check at 10 weeks  and 1 days.  Here for follow up related to N&V.  Reports feeling much better.   Chief Complaint   Patient presents with     Prenatal Care     10 weeks 1 day       Initial LMP 06/18/2018 Estimated body mass index is 20.24 kg/(m^2) as calculated from the following:    Height as of 8/17/18: 1.6 m (5' 3\").    Weight as of 8/29/18: 51.8 kg (114 lb 4 oz).  Medication Reconciliation: complete    Sheri Baron, ANGELIC Baron........................9/5/2018  8:57 AM      "

## 2018-09-11 ENCOUNTER — PRENATAL OFFICE VISIT (OUTPATIENT)
Dept: OBGYN | Facility: OTHER | Age: 28
End: 2018-09-11
Attending: OBSTETRICS & GYNECOLOGY
Payer: COMMERCIAL

## 2018-09-11 VITALS
SYSTOLIC BLOOD PRESSURE: 104 MMHG | WEIGHT: 120.19 LBS | HEART RATE: 84 BPM | DIASTOLIC BLOOD PRESSURE: 70 MMHG | BODY MASS INDEX: 21.29 KG/M2

## 2018-09-11 DIAGNOSIS — O21.9 NAUSEA AND VOMITING IN PREGNANCY: Primary | ICD-10-CM

## 2018-09-11 PROCEDURE — 99207 ZZC OB VISIT-NO CHARGE - GICH ONLY: CPT | Performed by: OBSTETRICS & GYNECOLOGY

## 2018-09-11 RX ORDER — PROMETHAZINE HYDROCHLORIDE 25 MG/1
25 SUPPOSITORY RECTAL EVERY 12 HOURS PRN
Qty: 20 SUPPOSITORY | Refills: 1 | Status: CANCELLED | OUTPATIENT
Start: 2018-09-11

## 2018-09-11 RX ORDER — METOCLOPRAMIDE 10 MG/1
10 TABLET ORAL EVERY 6 HOURS PRN
Qty: 30 TABLET | Refills: 1 | Status: ON HOLD | OUTPATIENT
Start: 2018-09-11 | End: 2019-03-31

## 2018-09-11 RX ORDER — PROMETHAZINE HYDROCHLORIDE 25 MG/1
25 TABLET ORAL EVERY 6 HOURS PRN
Qty: 30 TABLET | Refills: 1 | Status: ON HOLD | OUTPATIENT
Start: 2018-09-11 | End: 2019-03-31

## 2018-09-11 ASSESSMENT — PAIN SCALES - GENERAL: PAINLEVEL: NO PAIN (0)

## 2018-09-11 NOTE — PROGRESS NOTES
Return OB Visit    S: Patient reports she is feeling much better. She was able to complete her crossfit competition this past weekend with staying on her scheduled medications. No cramping or VB.     O: /70 (BP Location: Right arm, Patient Position: Sitting, Cuff Size: Adult Regular)  Pulse 84  Wt 54.5 kg (120 lb 3 oz)  LMP 2018  BMI 21.29 kg/m2  Gen: Well-appearing, NAD  See OB Flowsheet    A/P:  Gerda Hill is a 27 year old  at 11w0d by 7w3d US, here for return OB visit.  Nausea/vomiting: taking unisom/B6, zofran, reglan, phenergan. Improving       PNC: Rh positive, Rubella immune  Genetics: desires- will do at 16-18 weeks  Imaging: dating US at 7w3d  Immunizations: none  RTC 4 weeks    Lizeth Langston MD  OB/GYN  2018 9:01 AM

## 2018-09-11 NOTE — NURSING NOTE
"Patient presents today for her prenatal check-up. She is currently 11w0d.  Melany Mac LPN  9/11/2018  8:57 AM           Chief Complaint   Patient presents with     Prenatal Care     11w0d       Initial /70 (BP Location: Right arm, Patient Position: Sitting, Cuff Size: Adult Regular)  Pulse 84  Wt 54.5 kg (120 lb 3 oz)  LMP 06/18/2018  BMI 21.29 kg/m2 Estimated body mass index is 21.29 kg/(m^2) as calculated from the following:    Height as of 8/17/18: 1.6 m (5' 3\").    Weight as of this encounter: 54.5 kg (120 lb 3 oz).  Medication Reconciliation: complete    Melany Mac LPN    "

## 2018-09-11 NOTE — MR AVS SNAPSHOT
After Visit Summary   9/11/2018    Gerda Hill    MRN: [id number]           Patient Information     Date Of Birth          1990        Visit Information        Provider Department      9/11/2018 9:00 AM Lizeth Langston MD [facility]        Today's Diagnoses     Nausea and vomiting in pregnancy    -  1       Follow-ups after your visit        Your next 10 appointments already scheduled     Oct 16, 2018  3:30 PM CDT   ESTABLISHED PRENATAL with Balwinder Langston MD   [facility]    [address]  [place] [id number] [phone]              Who to contact     If you have questions or need follow up information about today's clinic visit or your schedule please contact [facility] directly at [phone].  Normal or non-critical lab and imaging results will be communicated to you by [employer], letter or phone within 4 business days after the clinic has received the results. If you do not hear from us within 7 days, please contact the clinic through [employer] or phone. If you have a critical or abnormal lab result, we will notify you by phone as soon as possible.  Submit refill requests through Pinshape or call your pharmacy and they will forward the refill request to us. Please allow 3 business days for your refill to be completed.          Additional Information About Your Visit        MyChart Information     P[employer]ape gives you secure access to your electronic health record. If you see a primary care provider, you can also send messages to your care team and make appointments. If you have questions, please call your primary care clinic.  If you do not have a primary care provider, please call [phone] and they will assist you.        Care EveryWhere ID     This is your Care EveryWhere ID. This could be used by other organizations to access your [facility]  records  [id number]        Your Vitals Were     Pulse Last Period BMI (Body Mass Index)             84 06/18/2018 21.29 kg/m2          Blood Pressure from Last 3 Encounters:   09/11/18 104/70   09/05/18 98/66   08/29/18 100/80    Weight from Last 3 Encounters:   09/11/18 54.5 kg (120 lb 3 oz)   09/05/18 53.4 kg (117 lb 12.8 oz)   08/29/18 51.8 kg (114 lb 4 oz)              Today, you had the following     No orders found for display         Today's Medication Changes          These changes are accurate as of 9/11/18  9:12 AM.  If you have any questions, ask your nurse or doctor.               These medicines have changed or have updated prescriptions.        Dose/Directions    * promethazine 25 MG Suppository   Commonly known as:  PHENERGAN   This may have changed:  Another medication with the same name was added. Make sure you understand how and when to take each.   Used for:  Nausea and vomiting in pregnancy   Changed by:  Lizeth Langston MD        Dose:  25 mg   Place 1 suppository (25 mg) rectally every 12 hours as needed for nausea   Quantity:  20 suppository   Refills:  1       * promethazine 25 MG tablet   Commonly known as:  PHENERGAN   This may have changed:  You were already taking a medication with the same name, and this prescription was added. Make sure you understand how and when to take each.   Used for:  Nausea and vomiting in pregnancy   Changed by:  Lizeth Langston MD        Dose:  25 mg   Take 1 tablet (25 mg) by mouth every 6 hours as needed for nausea   Quantity:  30 tablet   Refills:  1       * Notice:  This list has 2 medication(s) that are the same as other medications prescribed for you. Read the directions carefully, and ask your doctor or other care provider to review them with you.         Where to get your medicines      These medications were sent to [id number] - [place], [place] - [address], [facility]  [address]     Phone:  [phone]     metoclopramide 10 MG tablet    promethazine 25 MG tablet                Primary Care Provider Fax #    Physician No Ref-Primary [phone]       No address on file        Equal Access to Services     TYRELL BARKSDALE : Caity edenilson deluna yani Sofia, wavargheseda luqadaha, qaybta kaalmada emeli, raad larioskathe vincent. So [facility] [phone].    ATENCIÓN: Si habla español, tiene a sheets disposición servicios gratuitos de asistencia lingüística. Llame al [phone].    We comply with applicable federal civil rights laws and [place] laws. We do not discriminate on the basis of race, color, national origin, age, disability, sex, sexual orientation, or gender identity.            Thank you!     Thank you for choosing [facility] AND [facility]  for your care. Our goal is always to provide you with excellent care. Hearing back from our patients is one way we can continue to improve our services. Please take a few minutes to complete the written survey that you may receive in the mail after your visit with us. Thank you!             Your Updated Medication List - Protect others around you: Learn how to safely use, store and throw away your medicines at www.disposemymeds.org.          This list is accurate as of 9/11/18  9:12 AM.  Always use your most recent med list.                   Brand Name Dispense Instructions for use Diagnosis    GNP PRENATAL VITAMINS 28-0.8 MG Tabs      Take 1 tablet by mouth daily        metoclopramide 10 MG tablet    REGLAN    30 tablet    Take 1 tablet (10 mg) by mouth every 6 hours as needed    Nausea and vomiting in pregnancy       ondansetron 4 MG tablet    ZOFRAN    30 tablet    Take 1 tablet (4 mg) by mouth every 6 hours as needed for nausea    Nausea/vomiting in pregnancy       * promethazine 25 MG Suppository    PHENERGAN    20 suppository    Place 1 suppository (25 mg) rectally every 12 hours as needed for nausea    Nausea and  vomiting in pregnancy       * promethazine 25 MG tablet    PHENERGAN    30 tablet    Take 1 tablet (25 mg) by mouth every 6 hours as needed for nausea    Nausea and vomiting in pregnancy       pyridOXINE 25 MG tablet    vitamin B-6     Take 1 tablet (25 mg) by mouth every morning        ranitidine 150 MG tablet    ZANTAC    100 tablet    Take 1 tablet (150 mg) by mouth 2 times daily    Nausea and vomiting in pregnancy       UNISOM 25 MG Tabs tablet   Generic drug:  doxylamine      Take 1 tablet (25 mg) by mouth At Bedtime        * Notice:  This list has 2 medication(s) that are the same as other medications prescribed for you. Read the directions carefully, and ask your doctor or other care provider to review them with you.

## 2018-09-25 NOTE — TELEPHONE ENCOUNTER
Gerda is scheduled to see [facility] on 8/17/18 for her OB Px.  She is new to the area and her LMP was on 6/18/18.  Please place order for dating ultrasound.  Patient is aware we will call her to schedule.  Rossana Max on 8/1/2018 at 10:32 AM     Complex Repair And Transposition Flap Text: The defect edges were debeveled with a #15 scalpel blade.  The primary defect was closed partially with a complex linear closure.  Given the location of the remaining defect, shape of the defect and the proximity to free margins a transposition flap was deemed most appropriate for complete closure of the defect.  Using a sterile surgical marker, an appropriate advancement flap was drawn incorporating the defect and placing the expected incisions within the relaxed skin tension lines where possible.    The area thus outlined was incised deep to adipose tissue with a #15 scalpel blade.  The skin margins were undermined to an appropriate distance in all directions utilizing iris scissors.

## 2018-10-16 ENCOUNTER — PRENATAL OFFICE VISIT (OUTPATIENT)
Dept: OBGYN | Facility: OTHER | Age: 28
End: 2018-10-16
Attending: OBSTETRICS & GYNECOLOGY
Payer: COMMERCIAL

## 2018-10-16 VITALS
DIASTOLIC BLOOD PRESSURE: 60 MMHG | WEIGHT: 125 LBS | BODY MASS INDEX: 22.14 KG/M2 | HEART RATE: 64 BPM | SYSTOLIC BLOOD PRESSURE: 110 MMHG

## 2018-10-16 DIAGNOSIS — R12 HEARTBURN: Primary | ICD-10-CM

## 2018-10-16 DIAGNOSIS — Z3A.16 16 WEEKS GESTATION OF PREGNANCY: ICD-10-CM

## 2018-10-16 LAB — TSH SERPL DL<=0.05 MIU/L-ACNC: 1.62 IU/ML (ref 0.34–5.6)

## 2018-10-16 PROCEDURE — 81511 FTL CGEN ABNOR FOUR ANAL: CPT | Performed by: OBSTETRICS & GYNECOLOGY

## 2018-10-16 PROCEDURE — 99207 ZZC OB VISIT-NO CHARGE - GICH ONLY: CPT | Performed by: OBSTETRICS & GYNECOLOGY

## 2018-10-16 PROCEDURE — 84443 ASSAY THYROID STIM HORMONE: CPT | Performed by: OBSTETRICS & GYNECOLOGY

## 2018-10-16 PROCEDURE — 36415 COLL VENOUS BLD VENIPUNCTURE: CPT | Performed by: OBSTETRICS & GYNECOLOGY

## 2018-10-16 RX ORDER — SUCRALFATE 1 G/1
1 TABLET ORAL 2 TIMES DAILY PRN
Qty: 40 TABLET | Refills: 1 | Status: ON HOLD | OUTPATIENT
Start: 2018-10-16 | End: 2019-03-31

## 2018-10-16 ASSESSMENT — PAIN SCALES - GENERAL: PAINLEVEL: NO PAIN (0)

## 2018-10-16 NOTE — MR AVS SNAPSHOT
After Visit Summary   10/16/2018    Gerda Hill    MRN: [id number]           Patient Information     Date Of Birth          1990        Visit Information        Provider Department      10/16/2018 3:30 PM Balwinder Langston MD [facility]        Today's Diagnoses     Heartburn    -  1    16 weeks gestation of pregnancy           Follow-ups after your visit        Your next 10 appointments already scheduled     Nov 13, 2018  1:00 PM CST   (Arrive by 12:45 PM)   US OB >14 WEEKS with GHUS2   [facility] ([facility])    [address]  [place] [address]   [phone]           How do I prepare for my exam? (Food and drink instructions) Drink four 8-ounce glasses of fluid an hour before your exam. If you need to empty your bladder before your exam, try to release only a little urine. Then, drink another glass of fluid.  How do I prepare for my exam? (Other instructions) You may have up to two family members in the exam room. If you bring a small child, an adult must be there to care for him or her. No video or camera photography during the procedure.  What should I wear: Wear comfortable clothes.  How long does the exam take: Most ultrasounds take 30 to 60 minutes.  What should I bring: Bring a list of your medicines, including vitamins, minerals and over-the-counter drugs. It is safest to leave personal items at home.  Do I need a :  No  is needed.  What do I need to tell my doctor: Tell your doctor about any allergies you may have.  What should I do after the exam: No restrictions, You may resume normal activities.  What is this test: An ultrasound uses sound waves to make pictures of the body. Sound waves do not cause pain. The only discomfort may be the pressure of the wand against your skin or full bladder.  Who should I call with questions: If you have any questions, please call the Imaging Department  where you will have your exam. Directions, parking instructions, and other information is available on our website, [email or web address].            Nov 13, 2018  1:45 PM CST   ESTABLISHED PRENATAL with Balwinder Langston MD   [facility] ([facility])    [address]  [place] [id number] [phone]              Future tests that were ordered for you today     Open Future Orders        Priority Expected Expires Ordered    US OB > 14 Weeks Routine 11/12/2018 10/16/2019 10/16/2018            Who to contact     If you have questions or need follow up information about today's clinic visit or your schedule please contact [facility] [place] directly at [phone].  Normal or non-critical lab and imaging results will be communicated to you by [employer], letter or phone within 4 business days after the clinic has received the results. If you do not hear from us within 7 days, please contact the clinic through [employer] or phone. If you have a critical or abnormal lab result, we will notify you by phone as soon as possible.  Submit refill requests through [employer] or call your pharmacy and they will forward the refill request to us. Please allow 3 business days for your refill to be completed.          Additional Information About Your Visit        [employer] Information     [employer] gives you secure access to your electronic health record. If you see a primary care provider, you can also send messages to your care team and make appointments. If you have questions, please call your primary care clinic.  If you do not have a primary care provider, please call [phone] and they will assist you.        Care EveryWhere ID     This is your Care EveryWhere ID. This could be used by other organizations to access your [facility] medical records  [id number]        Your Vitals Were     Pulse Last Period Breastfeeding? BMI (Body Mass Index)          64 06/18/2018 No  22.14 kg/m2         Blood Pressure from Last 3 Encounters:   10/16/18 110/60   09/11/18 104/70   09/05/18 98/66    Weight from Last 3 Encounters:   10/16/18 56.7 kg (125 lb)   09/11/18 54.5 kg (120 lb 3 oz)   09/05/18 53.4 kg (117 lb 12.8 oz)              We Performed the Following     Maternal Quad Marker 2nd Trimester     TSH          Today's Medication Changes          These changes are accurate as of 10/16/18  4:10 PM.  If you have any questions, ask your nurse or doctor.               Start taking these medicines.        Dose/Directions    sucralfate 1 GM tablet   Commonly known as:  CARAFATE   Used for:  Heartburn, 16 weeks gestation of pregnancy   Started by:  Balwinder Langston MD        Dose:  1 g   Take 1 tablet (1 g) by mouth 2 times daily as needed   Quantity:  40 tablet   Refills:  1            Where to get your medicines      These medications were sent to [id number] - [place], [place] - [address]  [address], [facility] [address]     Phone:  [phone]     sucralfate 1 GM tablet                Primary Care Provider Fax #    Physician No Ref-Primary [phone]       No address on file        Equal Access to Services     TYRELL BARKSDALE : Caity mooreo Soomaali, waaxda luqadaha, qaybta kaalmada adeegyada, raad vincent. So [facility] [phone].    ATENCIÓN: Si habla español, tiene a sheets disposición servicios gratuitos de asistencia lingüística. Llame al [phone].    We comply with applicable federal civil rights laws and [place] laws. We do not discriminate on the basis of race, color, national origin, age, disability, sex, sexual orientation, or gender identity.            Thank you!     Thank you for choosing [facility] AND [facility]  for your care. Our goal is always to provide you with excellent care. Hearing back from our patients is one way we can continue to improve our services. Please take a few  minutes to complete the written survey that you may receive in the mail after your visit with us. Thank you!             Your Updated Medication List - Protect others around you: Learn how to safely use, store and throw away your medicines at www.disposemymeds.org.          This list is accurate as of 10/16/18  4:10 PM.  Always use your most recent med list.                   Brand Name Dispense Instructions for use Diagnosis    GNP PRENATAL VITAMINS 28-0.8 MG Tabs      Take 1 tablet by mouth daily        metoclopramide 10 MG tablet    REGLAN    30 tablet    Take 1 tablet (10 mg) by mouth every 6 hours as needed    Nausea and vomiting in pregnancy       ondansetron 4 MG tablet    ZOFRAN    30 tablet    Take 1 tablet (4 mg) by mouth every 6 hours as needed for nausea    Nausea/vomiting in pregnancy       * promethazine 25 MG Suppository    PHENERGAN    20 suppository    Place 1 suppository (25 mg) rectally every 12 hours as needed for nausea    Nausea and vomiting in pregnancy       * promethazine 25 MG tablet    PHENERGAN    30 tablet    Take 1 tablet (25 mg) by mouth every 6 hours as needed for nausea    Nausea and vomiting in pregnancy       pyridOXINE 25 MG tablet    vitamin B-6     Take 1 tablet (25 mg) by mouth every morning        ranitidine 150 MG tablet    ZANTAC    100 tablet    Take 1 tablet (150 mg) by mouth 2 times daily    Nausea and vomiting in pregnancy       sucralfate 1 GM tablet    CARAFATE    40 tablet    Take 1 tablet (1 g) by mouth 2 times daily as needed    Heartburn, 16 weeks gestation of pregnancy       UNISOM 25 MG Tabs tablet   Generic drug:  doxylamine      Take 1 tablet (25 mg) by mouth At Bedtime        * Notice:  This list has 2 medication(s) that are the same as other medications prescribed for you. Read the directions carefully, and ask your doctor or other care provider to review them with you.

## 2018-10-16 NOTE — NURSING NOTE
Chief Complaint   Patient presents with     Prenatal Care     16WD0       Medication Reconciliation: completed   Nicole Mason LPN  10/16/2018 3:31 PM

## 2018-10-19 LAB
# FETUSES US: NORMAL
# FETUSES: 1
AFP ADJ MOM AMN: 0.87
AFP SERPL-MCNC: 32 NG/ML
AGE - REPORTED: 28.3 YR
CURRENT SMOKER: NO
CURRENT SMOKER: NO
DIABETES STATUS PATIENT: NO
FAMILY MEMBER DISEASES HX: NO
FAMILY MEMBER DISEASES HX: NO
GA METHOD: NORMAL
GA METHOD: NORMAL
GA: NORMAL WK
HCG MOM SERPL: 0.89
HCG SERPL-ACNC: NORMAL IU/L
HX OF HEREDITARY DISORDERS: NO
IDDM PATIENT QL: NO
INHIBIN A MOM SERPL: 0.98
INHIBIN A SERPL-MCNC: 179 PG/ML
INTEGRATED SCN PATIENT-IMP: NORMAL
IVF PREGNANCY: NO
LMP START DATE: NORMAL
MONOCHORIONIC TWINS: NO
PATHOLOGY STUDY: NORMAL
PREV FETUS DEFECT: NO
SERVICE CMNT-IMP: NO
SPECIMEN DRAWN SERPL: NORMAL
U ESTRIOL MOM SERPL: 0.91
U ESTRIOL SERPL-MCNC: 0.83 NG/ML
VALPROIC/CARBAMAZEPINE STATUS: NO
WEIGHT UNITS: NORMAL

## 2018-11-13 ENCOUNTER — PRENATAL OFFICE VISIT (OUTPATIENT)
Dept: OBGYN | Facility: OTHER | Age: 28
End: 2018-11-13
Attending: OBSTETRICS & GYNECOLOGY
Payer: COMMERCIAL

## 2018-11-13 ENCOUNTER — HOSPITAL ENCOUNTER (OUTPATIENT)
Dept: ULTRASOUND IMAGING | Facility: OTHER | Age: 28
Discharge: HOME | End: 2018-11-13
Attending: OBSTETRICS & GYNECOLOGY | Admitting: OBSTETRICS & GYNECOLOGY
Payer: COMMERCIAL

## 2018-11-13 VITALS
HEART RATE: 60 BPM | DIASTOLIC BLOOD PRESSURE: 60 MMHG | BODY MASS INDEX: 23.03 KG/M2 | WEIGHT: 130 LBS | SYSTOLIC BLOOD PRESSURE: 104 MMHG

## 2018-11-13 DIAGNOSIS — Z3A.20 20 WEEKS GESTATION OF PREGNANCY: Primary | ICD-10-CM

## 2018-11-13 DIAGNOSIS — Z3A.16 16 WEEKS GESTATION OF PREGNANCY: ICD-10-CM

## 2018-11-13 PROCEDURE — 99207 ZZC OB VISIT-NO CHARGE - GICH ONLY: CPT | Performed by: OBSTETRICS & GYNECOLOGY

## 2018-11-13 PROCEDURE — 76805 OB US >/= 14 WKS SNGL FETUS: CPT

## 2018-11-13 ASSESSMENT — PAIN SCALES - GENERAL: PAINLEVEL: NO PAIN (0)

## 2018-11-13 NOTE — NURSING NOTE
Chief Complaint   Patient presents with     Prenatal Care     20W0D       Medication Reconciliation: completed   Nicole Mason LPN  11/13/2018 2:01 PM

## 2018-11-13 NOTE — PROGRESS NOTES
US - beautiful!  Does not know gender  AGA 20-0; anterior placenta, no abnormalities seen  Heartburn better

## 2018-11-13 NOTE — MR AVS SNAPSHOT
After Visit Summary   11/13/2018    Gerda Hill    MRN: [id number]           Patient Information     Date Of Birth          1990        Visit Information        Provider Department      11/13/2018 1:45 PM Balwinder Langston MD [facility]        Today's Diagnoses     20 weeks gestation of pregnancy    -  1       Follow-ups after your visit        Your next 10 appointments already scheduled     Dec 11, 2018  3:00 PM CST   ESTABLISHED PRENATAL with Lizeth Langston MD   [facility]    [address]  [place] [id number] [phone]              Who to contact     If you have questions or need follow up information about today's clinic visit or your schedule please contact [facility] directly at [phone].  Normal or non-critical lab and imaging results will be communicated to you by [employer], letter or phone within 4 business days after the clinic has received the results. If you do not hear from us within 7 days, please contact the clinic through [employer] or phone. If you have a critical or abnormal lab result, we will notify you by phone as soon as possible.  Submit refill requests through [employer] or call your pharmacy and they will forward the refill request to us. Please allow 3 business days for your refill to be completed.          Additional Information About Your Visit        MyChart Information     [employer] gives you secure access to your electronic health record. If you see a primary care provider, you can also send messages to your care team and make appointments. If you have questions, please call your primary care clinic.  If you do not have a primary care provider, please call [phone] and they will assist you.        Care EveryWhere ID     This is your Care EveryWhere ID. This could be used by other organizations to access your [facility]  records  [id number]        Your Vitals Were     Pulse Last Period Breastfeeding? BMI (Body Mass Index)          60 06/18/2018 No 23.03 kg/m2         Blood Pressure from Last 3 Encounters:   11/13/18 104/60   10/16/18 110/60   09/11/18 104/70    Weight from Last 3 Encounters:   11/13/18 59 kg (130 lb)   10/16/18 56.7 kg (125 lb)   09/11/18 54.5 kg (120 lb 3 oz)              Today, you had the following     No orders found for display       Primary Care Provider Fax #    Physician No Ref-Primary [phone]       No address on file        Equal Access to Services     [facility] : Caity Sofia, jocelyn villasenor, chao sainz, raad ash . So [facility] [phone].    ATENCIÓN: Si habla español, tiene a sheets disposición servicios gratuitos de asistencia lingüística. Llame al [phone].    We comply with applicable federal civil rights laws and [place] laws. We do not discriminate on the basis of race, color, national origin, age, disability, sex, sexual orientation, or gender identity.            Thank you!     Thank you for choosing [facility] AND \[facility]\""  for your care. Our goal is always to provide you with excellent care. Hearing back from our patients is one way we can continue to improve our services. Please take a few minutes to complete the written survey that you may receive in the mail after your visit with us. Thank you!             Your Updated Medication List - Protect others around you: Learn how to safely use, store and throw away your medicines at www.disposemymeds.org.          This list is accurate as of 11/13/18  2:36 PM.  Always use your most recent med list.                   Brand Name Dispense Instructions for use Diagnosis    GNP PRENATAL VITAMINS 28-0.8 MG Tabs      Take 1 tablet by mouth daily        metoclopramide 10 MG tablet    REGLAN    30 tablet    Take 1 tablet (10 mg) by mouth every 6 hours as needed    Nausea and  vomiting in pregnancy       ondansetron 4 MG tablet    ZOFRAN    30 tablet    Take 1 tablet (4 mg) by mouth every 6 hours as needed for nausea    Nausea/vomiting in pregnancy       * promethazine 25 MG Suppository    PHENERGAN    20 suppository    Place 1 suppository (25 mg) rectally every 12 hours as needed for nausea    Nausea and vomiting in pregnancy       * promethazine 25 MG tablet    PHENERGAN    30 tablet    Take 1 tablet (25 mg) by mouth every 6 hours as needed for nausea    Nausea and vomiting in pregnancy       pyridOXINE 25 MG tablet    vitamin B-6     Take 1 tablet (25 mg) by mouth every morning        ranitidine 150 MG tablet    ZANTAC    100 tablet    Take 1 tablet (150 mg) by mouth 2 times daily    Nausea and vomiting in pregnancy       sucralfate 1 GM tablet    CARAFATE    40 tablet    Take 1 tablet (1 g) by mouth 2 times daily as needed    Heartburn, 16 weeks gestation of pregnancy       UNISOM 25 MG Tabs tablet   Generic drug:  doxylamine      Take 1 tablet (25 mg) by mouth At Bedtime        * Notice:  This list has 2 medication(s) that are the same as other medications prescribed for you. Read the directions carefully, and ask your doctor or other care provider to review them with you.

## 2018-12-11 ENCOUNTER — PRENATAL OFFICE VISIT (OUTPATIENT)
Dept: OBGYN | Facility: OTHER | Age: 28
End: 2018-12-11
Attending: OBSTETRICS & GYNECOLOGY
Payer: COMMERCIAL

## 2018-12-11 VITALS
WEIGHT: 137.31 LBS | HEART RATE: 76 BPM | DIASTOLIC BLOOD PRESSURE: 78 MMHG | BODY MASS INDEX: 24.32 KG/M2 | SYSTOLIC BLOOD PRESSURE: 110 MMHG

## 2018-12-11 DIAGNOSIS — Z3A.16 16 WEEKS GESTATION OF PREGNANCY: ICD-10-CM

## 2018-12-11 DIAGNOSIS — R12 HEARTBURN: ICD-10-CM

## 2018-12-11 DIAGNOSIS — Z34.02 ENCOUNTER FOR SUPERVISION OF NORMAL FIRST PREGNANCY IN SECOND TRIMESTER: Primary | ICD-10-CM

## 2018-12-11 LAB
ERYTHROCYTE [DISTWIDTH] IN BLOOD BY AUTOMATED COUNT: 12.4 % (ref 10–15)
GLUCOSE 1H P 50 G GLC PO SERPL-MCNC: 126 MG/DL (ref 60–129)
HCT VFR BLD AUTO: 38.8 % (ref 35–47)
HGB BLD-MCNC: 13.1 G/DL (ref 11.7–15.7)
MCH RBC QN AUTO: 31.7 PG (ref 26.5–33)
MCHC RBC AUTO-ENTMCNC: 33.8 G/DL (ref 31.5–36.5)
MCV RBC AUTO: 94 FL (ref 78–100)
PLATELET # BLD AUTO: 247 10E9/L (ref 150–450)
RBC # BLD AUTO: 4.13 10E12/L (ref 3.8–5.2)
WBC # BLD AUTO: 12.1 10E9/L (ref 4–11)

## 2018-12-11 PROCEDURE — 86592 SYPHILIS TEST NON-TREP QUAL: CPT | Performed by: OBSTETRICS & GYNECOLOGY

## 2018-12-11 PROCEDURE — 82950 GLUCOSE TEST: CPT | Performed by: OBSTETRICS & GYNECOLOGY

## 2018-12-11 PROCEDURE — 36415 COLL VENOUS BLD VENIPUNCTURE: CPT | Performed by: OBSTETRICS & GYNECOLOGY

## 2018-12-11 PROCEDURE — 99207 ZZC OB VISIT-NO CHARGE - GICH ONLY: CPT | Performed by: OBSTETRICS & GYNECOLOGY

## 2018-12-11 PROCEDURE — 85027 COMPLETE CBC AUTOMATED: CPT | Performed by: OBSTETRICS & GYNECOLOGY

## 2018-12-11 PROCEDURE — 86780 TREPONEMA PALLIDUM: CPT | Mod: XU | Performed by: OBSTETRICS & GYNECOLOGY

## 2018-12-11 PROCEDURE — 86780 TREPONEMA PALLIDUM: CPT | Performed by: OBSTETRICS & GYNECOLOGY

## 2018-12-11 RX ORDER — SUCRALFATE 1 G/1
1 TABLET ORAL 2 TIMES DAILY PRN
Qty: 40 TABLET | Refills: 1 | Status: CANCELLED | OUTPATIENT
Start: 2018-12-11

## 2018-12-11 ASSESSMENT — PAIN SCALES - GENERAL: PAINLEVEL: NO PAIN (0)

## 2018-12-11 NOTE — PROGRESS NOTES
Return OB Visit    S: Patient is feeling well. No ctx, VB or LOF. +FM. Heartburn is still bad despite carafate.    O: /78 (BP Location: Right arm, Patient Position: Sitting, Cuff Size: Adult Large)   Pulse 76   Wt 62.3 kg (137 lb 5 oz)   LMP 2018   BMI 24.32 kg/m    Gen: Well-appearing, NAD  See OB Flowsheet    A/P:  Gerda Hill is a 28 year old  at 24w0d by 7w3d US, here for return OB visit.  GERD: ranitidine BID  Plans breastfeeding    PNC: Rh positive, Rubella immune. GCT 2018   Genetics: normal quad screen  Imaging: dating US at 7w3d, normal anatomy  Immunizations: s/p flu  RTC 4 weeks- Tdap next visit    Lizeth Langston MD  OB/GYN  2018 3:13 PM

## 2018-12-13 ENCOUNTER — TELEPHONE (OUTPATIENT)
Dept: OBGYN | Facility: OTHER | Age: 28
End: 2018-12-13

## 2018-12-13 LAB
RPR SER QL: NONREACTIVE
T PALLIDUM AB SER QL: REACTIVE

## 2018-12-15 LAB
RPR SER QL: NONREACTIVE
T PALLIDUM AB SER QL AGGL: NON REACTIVE

## 2018-12-27 ENCOUNTER — MYC MEDICAL ADVICE (OUTPATIENT)
Dept: OBGYN | Facility: OTHER | Age: 28
End: 2018-12-27

## 2018-12-27 ENCOUNTER — HOSPITAL ENCOUNTER (OUTPATIENT)
Facility: OTHER | Age: 28
Discharge: HOME | End: 2018-12-27
Attending: OBSTETRICS & GYNECOLOGY | Admitting: OBSTETRICS & GYNECOLOGY
Payer: COMMERCIAL

## 2018-12-27 ENCOUNTER — TELEPHONE (OUTPATIENT)
Dept: OBGYN | Facility: OTHER | Age: 28
End: 2018-12-27

## 2018-12-27 VITALS — TEMPERATURE: 97.8 F | RESPIRATION RATE: 20 BRPM

## 2018-12-27 LAB
A1 MICROGLOB PLACENTAL VAG QL: NEGATIVE
ALBUMIN UR-MCNC: NEGATIVE MG/DL
AMORPH CRY #/AREA URNS HPF: ABNORMAL /HPF
APPEARANCE UR: CLEAR
BACTERIA #/AREA URNS HPF: ABNORMAL /HPF
BILIRUB UR QL STRIP: NEGATIVE
COLOR UR AUTO: YELLOW
GLUCOSE UR STRIP-MCNC: NEGATIVE MG/DL
HGB UR QL STRIP: NEGATIVE
KETONES UR STRIP-MCNC: NEGATIVE MG/DL
LEUKOCYTE ESTERASE UR QL STRIP: ABNORMAL
NITRATE UR QL: NEGATIVE
PH UR STRIP: 7 PH (ref 5–9)
RBC #/AREA URNS AUTO: ABNORMAL /HPF
SOURCE: ABNORMAL
SP GR UR STRIP: 1.02 (ref 1–1.03)
UROBILINOGEN UR STRIP-ACNC: 0.2 EU/DL (ref 0.2–1)
WBC #/AREA URNS AUTO: ABNORMAL /HPF

## 2018-12-27 PROCEDURE — 84112 EVAL AMNIOTIC FLUID PROTEIN: CPT | Performed by: OBSTETRICS & GYNECOLOGY

## 2018-12-27 PROCEDURE — 81001 URINALYSIS AUTO W/SCOPE: CPT | Performed by: OBSTETRICS & GYNECOLOGY

## 2018-12-27 NOTE — TELEPHONE ENCOUNTER
Returned patients call.  She reported she is leaking clear fluid, small gushes of odorless fluid upon standing that soak her underwear.  She denies vaginal bleeding or cramps.  Advised patient to present to Labor and Delivery now.  She verbalized understanding and agreed with the recommendations.  Notified L&D nurses.  Anne-Marie Marquez RN on 12/27/2018 at 4:47 PM

## 2018-12-28 NOTE — PROGRESS NOTES
Pt presents here possible SROM. Noted the last 2 mornings with small amounts of leaking clear fluid. Denies fevers or UTI symptoms.

## 2018-12-28 NOTE — TELEPHONE ENCOUNTER
Patient was triaged and sent to L&D.  See telephone note dated 12/27/18.  Anne-Marie Marquez RN on 12/28/2018 at 7:47 AM

## 2018-12-28 NOTE — PROGRESS NOTES
Dr JANI Langston notified of pt statis. If amniosure, urine and cervix LTC, pt may be discharged. Plan reviewed with on coming shift.

## 2018-12-28 NOTE — PROGRESS NOTES
Amnisure negative, cervix is closed, UA normal. Patient being discharged at this time. Leslie Duenas RN on 12/27/2018 at 7:23 PM

## 2019-01-08 ENCOUNTER — PRENATAL OFFICE VISIT (OUTPATIENT)
Dept: OBGYN | Facility: OTHER | Age: 29
End: 2019-01-08
Attending: OBSTETRICS & GYNECOLOGY
Payer: COMMERCIAL

## 2019-01-08 VITALS
BODY MASS INDEX: 25.42 KG/M2 | DIASTOLIC BLOOD PRESSURE: 80 MMHG | SYSTOLIC BLOOD PRESSURE: 108 MMHG | WEIGHT: 143.5 LBS | HEART RATE: 80 BPM

## 2019-01-08 DIAGNOSIS — Z34.03 ENCOUNTER FOR SUPERVISION OF NORMAL FIRST PREGNANCY IN THIRD TRIMESTER: Primary | ICD-10-CM

## 2019-01-08 PROCEDURE — 90471 IMMUNIZATION ADMIN: CPT | Performed by: OBSTETRICS & GYNECOLOGY

## 2019-01-08 PROCEDURE — 99207 ZZC OB VISIT-NO CHARGE - GICH ONLY: CPT | Performed by: OBSTETRICS & GYNECOLOGY

## 2019-01-08 PROCEDURE — 90715 TDAP VACCINE 7 YRS/> IM: CPT | Performed by: OBSTETRICS & GYNECOLOGY

## 2019-01-08 ASSESSMENT — PAIN SCALES - GENERAL: PAINLEVEL: NO PAIN (0)

## 2019-01-08 NOTE — NURSING NOTE
"Chief Complaint   Patient presents with     Prenatal Care     28w0d       Initial /80 (BP Location: Right arm, Patient Position: Sitting, Cuff Size: Adult Regular)   Pulse 80   Wt 65.1 kg (143 lb 8 oz)   LMP 06/18/2018   BMI 25.42 kg/m   Estimated body mass index is 25.42 kg/m  as calculated from the following:    Height as of 8/17/18: 1.6 m (5' 3\").    Weight as of this encounter: 65.1 kg (143 lb 8 oz).  Medication Reconciliation: complete    Melany Mac LPN  "

## 2019-01-08 NOTE — PROGRESS NOTES
Return OB Visit    S: Patient has been feeling well. Some BH ctx, no VB or LOF. +FM    O: /80 (BP Location: Right arm, Patient Position: Sitting, Cuff Size: Adult Regular)   Pulse 80   Wt 65.1 kg (143 lb 8 oz)   LMP 2018   BMI 25.42 kg/m    Gen: Well-appearing, NAD  See OB Flowsheet    A/P:  Gerda Hill is a 28 year old  at 28w0d by 7w3d US, here for return OB visit.  GERD: ranitidine BID  Plans breastfeeding, no epidural  Undecided on contraception, reviewed options     PNC: Rh positive, Rubella immune.    Genetics: normal quad screen  Imaging: dating US at 7w3d, normal anatomy  Immunizations: s/p flu, Tdap  RTC 4 weeks    Lizeth Langston MD  OB/GYN  2019 4:11 PM

## 2019-02-05 ENCOUNTER — PRENATAL OFFICE VISIT (OUTPATIENT)
Dept: OBGYN | Facility: OTHER | Age: 29
End: 2019-02-05
Attending: OBSTETRICS & GYNECOLOGY
Payer: COMMERCIAL

## 2019-02-05 VITALS
BODY MASS INDEX: 26.06 KG/M2 | RESPIRATION RATE: 20 BRPM | SYSTOLIC BLOOD PRESSURE: 120 MMHG | WEIGHT: 147.13 LBS | DIASTOLIC BLOOD PRESSURE: 76 MMHG | HEART RATE: 92 BPM

## 2019-02-05 DIAGNOSIS — Z34.03 ENCOUNTER FOR SUPERVISION OF NORMAL FIRST PREGNANCY IN THIRD TRIMESTER: Primary | ICD-10-CM

## 2019-02-05 PROCEDURE — 99207 ZZC OB VISIT-NO CHARGE - GICH ONLY: CPT | Performed by: OBSTETRICS & GYNECOLOGY

## 2019-02-05 ASSESSMENT — PAIN SCALES - GENERAL: PAINLEVEL: NO PAIN (0)

## 2019-02-05 NOTE — PROGRESS NOTES
Return OB Visit    S: Patient is feeling well. Some BH ctx, no VB or LOF. +FM    O: /76 (BP Location: Right arm, Patient Position: Sitting, Cuff Size: Adult Large)   Pulse 92   Resp 20   Wt 66.7 kg (147 lb 2 oz)   LMP 2018   BMI 26.06 kg/m    Gen: Well-appearing, NAD  See OB Flowsheet    A/P:  Gerda Hill is a 28 year old  at 32w0d by 7w3d US, here for return OB visit.  GERD: ranitidine BID  Plans breastfeeding, no epidural  Undecided on contraception, reviewed options     PNC: Rh positive, Rubella immune.    Genetics: normal quad screen  Imaging: dating US at 7w3d, normal anatomy  Immunizations: s/p flu, Tdap  RTC 2 weeks    Lizeth Langston MD  OB/GYN  2019 4:00 PM

## 2019-02-05 NOTE — NURSING NOTE
"Chief Complaint   Patient presents with     Prenatal Care     32w0d       Initial /76 (BP Location: Right arm, Patient Position: Sitting, Cuff Size: Adult Large)   Pulse 92   Resp 20   Wt 66.7 kg (147 lb 2 oz)   LMP 06/18/2018   BMI 26.06 kg/m   Estimated body mass index is 26.06 kg/m  as calculated from the following:    Height as of 8/17/18: 1.6 m (5' 3\").    Weight as of this encounter: 66.7 kg (147 lb 2 oz).  Medication Reconciliation: complete    Melany Mac LPN  "

## 2019-02-19 ENCOUNTER — PRENATAL OFFICE VISIT (OUTPATIENT)
Dept: OBGYN | Facility: OTHER | Age: 29
End: 2019-02-19
Attending: OBSTETRICS & GYNECOLOGY
Payer: COMMERCIAL

## 2019-02-19 VITALS
RESPIRATION RATE: 20 BRPM | BODY MASS INDEX: 26.65 KG/M2 | TEMPERATURE: 97.7 F | DIASTOLIC BLOOD PRESSURE: 68 MMHG | SYSTOLIC BLOOD PRESSURE: 104 MMHG | HEART RATE: 84 BPM | WEIGHT: 150.44 LBS

## 2019-02-19 DIAGNOSIS — K21.9 GASTROESOPHAGEAL REFLUX DISEASE WITHOUT ESOPHAGITIS: ICD-10-CM

## 2019-02-19 DIAGNOSIS — Z34.03 ENCOUNTER FOR SUPERVISION OF NORMAL FIRST PREGNANCY IN THIRD TRIMESTER: Primary | ICD-10-CM

## 2019-02-19 PROCEDURE — 99207 ZZC OB VISIT-NO CHARGE - GICH ONLY: CPT | Performed by: OBSTETRICS & GYNECOLOGY

## 2019-02-19 ASSESSMENT — PAIN SCALES - GENERAL: PAINLEVEL: NO PAIN (0)

## 2019-02-19 NOTE — PROGRESS NOTES
Return OB Visit    S: Patient is feeling.  Biggest complaint is persistent GERD, especially at night. Taking raniditine BID and TUMS. No ctx, VB or LOF. +FM    O: /68 (BP Location: Right arm, Patient Position: Sitting, Cuff Size: Adult Regular)   Pulse 84   Temp 97.7  F (36.5  C) (Tympanic)   Resp 20   Wt 68.2 kg (150 lb 7 oz)   LMP 2018   BMI 26.65 kg/m    Gen: Well-appearing, NAD  See OB Flowsheet    A/P:  Gerda Hill is a 28 year old  at 34w0d by 7w3d US, here for return OB visit.  GERD: ranitidine BID  Plans breastfeeding, no epidural  Mirena     PNC: Rh positive, Rubella immune.    Genetics: normal quad screen  Imaging: dating US at 7w3d, normal anatomy  Immunizations: s/p flu, Tdap  RTC 2 weeks- GBS next visit    Lizeth Langston MD  OB/GYN  2019 4:00 PM

## 2019-02-19 NOTE — NURSING NOTE
"Chief Complaint   Patient presents with     Prenatal Care     34w       Initial /68 (BP Location: Right arm, Patient Position: Sitting, Cuff Size: Adult Regular)   Pulse 84   Temp 97.7  F (36.5  C) (Tympanic)   Resp 20   Wt 68.2 kg (150 lb 7 oz)   LMP 06/18/2018   BMI 26.65 kg/m   Estimated body mass index is 26.65 kg/m  as calculated from the following:    Height as of 8/17/18: 1.6 m (5' 3\").    Weight as of this encounter: 68.2 kg (150 lb 7 oz).  Medication Reconciliation: complete    Melany Mac LPN  "

## 2019-03-04 ENCOUNTER — TELEPHONE (OUTPATIENT)
Dept: OBGYN | Facility: OTHER | Age: 29
End: 2019-03-04

## 2019-03-04 NOTE — TELEPHONE ENCOUNTER
"Patient calls today requesting prescription for breast pump to be sent to her insurance provider. Fax to \"[facility]\" at [phone]. Order pending for provider approval.    Lory Wynne RN...................3/4/2019 11:56 AM      "

## 2019-03-05 ENCOUNTER — PRENATAL OFFICE VISIT (OUTPATIENT)
Dept: OBGYN | Facility: OTHER | Age: 29
End: 2019-03-05
Attending: OBSTETRICS & GYNECOLOGY
Payer: COMMERCIAL

## 2019-03-05 VITALS
DIASTOLIC BLOOD PRESSURE: 70 MMHG | WEIGHT: 152.38 LBS | BODY MASS INDEX: 26.99 KG/M2 | TEMPERATURE: 96.9 F | HEART RATE: 84 BPM | SYSTOLIC BLOOD PRESSURE: 114 MMHG | RESPIRATION RATE: 24 BRPM

## 2019-03-05 DIAGNOSIS — Z34.03 ENCOUNTER FOR SUPERVISION OF NORMAL FIRST PREGNANCY, THIRD TRIMESTER: Primary | ICD-10-CM

## 2019-03-05 PROCEDURE — 87081 CULTURE SCREEN ONLY: CPT | Performed by: OBSTETRICS & GYNECOLOGY

## 2019-03-05 PROCEDURE — 99207 ZZC OB VISIT-NO CHARGE - GICH ONLY: CPT | Performed by: OBSTETRICS & GYNECOLOGY

## 2019-03-05 PROCEDURE — 87077 CULTURE AEROBIC IDENTIFY: CPT | Performed by: OBSTETRICS & GYNECOLOGY

## 2019-03-05 RX ORDER — BREAST PUMP
EACH MISCELLANEOUS
Qty: 1 EACH | Refills: 0 | Status: SHIPPED | OUTPATIENT
Start: 2019-03-05

## 2019-03-05 ASSESSMENT — PAIN SCALES - GENERAL: PAINLEVEL: NO PAIN (0)

## 2019-03-05 NOTE — PROGRESS NOTES
Return OB Visit    S: Patient is feeling well, just ready to be done. Some ctx, no VB or LOF. +FM.    O: /70 (BP Location: Right arm, Patient Position: Sitting, Cuff Size: Adult Regular)   Pulse 84   Temp 96.9  F (36.1  C) (Tympanic)   Resp 24   Wt 69.1 kg (152 lb 6 oz)   LMP 2018   BMI 26.99 kg/m    Gen: Well-appearing, NAD  See OB Flowsheet    A/P:  Gerda Hill is a 28 year old  at 36w0d by 7w3d US, here for return OB visit.  GERD: ranitidine BID  Plans breastfeeding, no epidural  Mirena     PNC: Rh positive, Rubella immune.    Genetics: normal quad screen  Imaging: dating US at 7w3d, normal anatomy  Immunizations: s/p flu, Tdap  RTC weekly until delivery    Lizeth Langston MD  OB/GYN  3/5/2019 3:59 PM

## 2019-03-05 NOTE — NURSING NOTE
"Chief Complaint   Patient presents with     Prenatal Care     36w       Initial /70 (BP Location: Right arm, Patient Position: Sitting, Cuff Size: Adult Regular)   Pulse 84   Temp 96.9  F (36.1  C) (Tympanic)   Resp 24   Wt 69.1 kg (152 lb 6 oz)   LMP 06/18/2018   BMI 26.99 kg/m   Estimated body mass index is 26.99 kg/m  as calculated from the following:    Height as of 8/17/18: 1.6 m (5' 3\").    Weight as of this encounter: 69.1 kg (152 lb 6 oz).  Medication Reconciliation: complete    Melany Mac LPN  "

## 2019-03-06 LAB
BACTERIA SPEC CULT: ABNORMAL
SPECIMEN SOURCE: ABNORMAL

## 2019-03-07 ENCOUNTER — TELEPHONE (OUTPATIENT)
Dept: OBGYN | Facility: OTHER | Age: 29
End: 2019-03-07

## 2019-03-07 NOTE — TELEPHONE ENCOUNTER
Patient had questions about GBS result. All questions answered.    Lory Wynne RN...................3/7/2019 10:26 AM

## 2019-03-12 ENCOUNTER — PRENATAL OFFICE VISIT (OUTPATIENT)
Dept: OBGYN | Facility: OTHER | Age: 29
End: 2019-03-12
Attending: OBSTETRICS & GYNECOLOGY
Payer: COMMERCIAL

## 2019-03-12 VITALS
DIASTOLIC BLOOD PRESSURE: 78 MMHG | RESPIRATION RATE: 24 BRPM | BODY MASS INDEX: 27.52 KG/M2 | HEART RATE: 72 BPM | TEMPERATURE: 97.2 F | WEIGHT: 155.38 LBS | SYSTOLIC BLOOD PRESSURE: 106 MMHG

## 2019-03-12 DIAGNOSIS — Z34.03 ENCOUNTER FOR SUPERVISION OF NORMAL FIRST PREGNANCY IN THIRD TRIMESTER: Primary | ICD-10-CM

## 2019-03-12 DIAGNOSIS — K21.9 GASTROESOPHAGEAL REFLUX DISEASE WITHOUT ESOPHAGITIS: ICD-10-CM

## 2019-03-12 PROCEDURE — 99207 ZZC OB VISIT-NO CHARGE - GICH ONLY: CPT | Performed by: OBSTETRICS & GYNECOLOGY

## 2019-03-12 RX ORDER — OMEPRAZOLE 40 MG/1
40 CAPSULE, DELAYED RELEASE ORAL DAILY
Qty: 30 CAPSULE | Refills: 1 | Status: ON HOLD | OUTPATIENT
Start: 2019-03-12 | End: 2019-03-31

## 2019-03-12 ASSESSMENT — PAIN SCALES - GENERAL: PAINLEVEL: NO PAIN (0)

## 2019-03-12 NOTE — PROGRESS NOTES
Return OB Visit    S: Some BH ctx, no VB or LOF. +FM. GERD not well controlled with ranitidine    O: /78 (BP Location: Right arm, Patient Position: Sitting, Cuff Size: Adult Large)   Pulse 72   Temp 97.2  F (36.2  C) (Tympanic)   Resp 24   Wt 70.5 kg (155 lb 6 oz)   LMP 2018   BMI 27.52 kg/m    Gen: Well-appearing, NAD  See OB Flowsheet    A/P:  Gerda Hill is a 28 year old  at 37w0d by 7w3d US, here for return OB visit.  GERD: switch to prilosec  Plans breastfeeding, no epidural  Mirena     PNC: Rh positive, Rubella immune. , GBS positive  Genetics: normal quad screen  Imaging: dating US at 7w3d, normal anatomy  Immunizations: s/p flu, Tdap  RTC weekly until delivery    Lizeth Langston MD  OB/GYN  3/12/2019 4:30 PM

## 2019-03-12 NOTE — NURSING NOTE
"Chief Complaint   Patient presents with     Prenatal Care     37w0d       Initial /78 (BP Location: Right arm, Patient Position: Sitting, Cuff Size: Adult Large)   Pulse 72   Temp 97.2  F (36.2  C) (Tympanic)   Resp 24   Wt 70.5 kg (155 lb 6 oz)   LMP 06/18/2018   BMI 27.52 kg/m   Estimated body mass index is 27.52 kg/m  as calculated from the following:    Height as of 8/17/18: 1.6 m (5' 3\").    Weight as of this encounter: 70.5 kg (155 lb 6 oz).  Medication Reconciliation: complete    Melany Mac LPN  "

## 2019-03-19 ENCOUNTER — PRENATAL OFFICE VISIT (OUTPATIENT)
Dept: OBGYN | Facility: OTHER | Age: 29
End: 2019-03-19
Attending: OBSTETRICS & GYNECOLOGY
Payer: COMMERCIAL

## 2019-03-19 VITALS
TEMPERATURE: 97.2 F | SYSTOLIC BLOOD PRESSURE: 108 MMHG | DIASTOLIC BLOOD PRESSURE: 78 MMHG | BODY MASS INDEX: 27.46 KG/M2 | HEART RATE: 80 BPM | RESPIRATION RATE: 16 BRPM | WEIGHT: 155 LBS

## 2019-03-19 DIAGNOSIS — Z34.03 ENCOUNTER FOR SUPERVISION OF NORMAL FIRST PREGNANCY IN THIRD TRIMESTER: Primary | ICD-10-CM

## 2019-03-19 PROCEDURE — 99207 ZZC OB VISIT-NO CHARGE - GICH ONLY: CPT | Performed by: OBSTETRICS & GYNECOLOGY

## 2019-03-19 ASSESSMENT — PAIN SCALES - GENERAL: PAINLEVEL: NO PAIN (0)

## 2019-03-19 NOTE — PROGRESS NOTES
Return OB Visit    S: Patient is feeling much better since switching to omeprazole. Increased groin pain with movement. No ctx, VB or LOF. +FM.    O: /78 (BP Location: Right arm, Patient Position: Sitting, Cuff Size: Adult Large)   Pulse 80   Temp 97.2  F (36.2  C) (Tympanic)   Resp 16   Wt 70.3 kg (155 lb)   LMP 2018   BMI 27.46 kg/m    Gen: Well-appearing, NAD  See OB Flowsheet    A/P:  Gerda Hill is a 28 year old  at 38w0d by 7w3d US, here for return OB visit.  GERD:  omeprazole  Plans breastfeeding, no epidural  Mirena     PNC: Rh positive, Rubella immune. , GBS positive  Genetics: normal quad screen  Imaging: dating US at 7w3d, normal anatomy  Immunizations: s/p flu, Tdap  RTC weekly until delivery    Lizeth Langston MD  OB/GYN  3/19/2019 4:08 PM

## 2019-03-19 NOTE — NURSING NOTE
"Chief Complaint   Patient presents with     Prenatal Care     38w       Initial /78 (BP Location: Right arm, Patient Position: Sitting, Cuff Size: Adult Large)   Pulse 80   Temp 97.2  F (36.2  C) (Tympanic)   Resp 16   Wt 70.3 kg (155 lb)   LMP 06/18/2018   BMI 27.46 kg/m   Estimated body mass index is 27.46 kg/m  as calculated from the following:    Height as of 8/17/18: 1.6 m (5' 3\").    Weight as of this encounter: 70.3 kg (155 lb).  Medication Reconciliation: complete    Melany Mac LPN  "

## 2019-03-21 ENCOUNTER — HOSPITAL ENCOUNTER (OUTPATIENT)
Facility: OTHER | Age: 29
Discharge: HOME | End: 2019-03-22
Attending: FAMILY MEDICINE | Admitting: FAMILY MEDICINE
Payer: COMMERCIAL

## 2019-03-21 PROBLEM — Z36.89 ENCOUNTER FOR TRIAGE IN PREGNANT PATIENT: Status: ACTIVE | Noted: 2018-12-27

## 2019-03-21 LAB
A1 MICROGLOB PLACENTAL VAG QL: NEGATIVE
ALBUMIN UR-MCNC: ABNORMAL MG/DL
APPEARANCE UR: CLEAR
BACTERIA #/AREA URNS HPF: ABNORMAL /HPF
BILIRUB UR QL STRIP: NEGATIVE
COLOR UR AUTO: YELLOW
GLUCOSE UR STRIP-MCNC: NEGATIVE MG/DL
HGB UR QL STRIP: NEGATIVE
KETONES UR STRIP-MCNC: NEGATIVE MG/DL
LEUKOCYTE ESTERASE UR QL STRIP: NEGATIVE
MUCOUS THREADS #/AREA URNS LPF: PRESENT /LPF
NITRATE UR QL: NEGATIVE
NON-SQ EPI CELLS #/AREA URNS LPF: ABNORMAL /LPF
PH UR STRIP: 6 PH (ref 5–9)
RBC #/AREA URNS AUTO: ABNORMAL /HPF
SOURCE: ABNORMAL
SP GR UR STRIP: >1.03 (ref 1–1.03)
UROBILINOGEN UR STRIP-ACNC: 0.2 EU/DL (ref 0.2–1)
WBC #/AREA URNS AUTO: ABNORMAL /HPF

## 2019-03-21 PROCEDURE — 81001 URINALYSIS AUTO W/SCOPE: CPT | Performed by: FAMILY MEDICINE

## 2019-03-21 PROCEDURE — 84112 EVAL AMNIOTIC FLUID PROTEIN: CPT | Performed by: FAMILY MEDICINE

## 2019-03-22 VITALS
TEMPERATURE: 97.2 F | OXYGEN SATURATION: 100 % | SYSTOLIC BLOOD PRESSURE: 125 MMHG | HEART RATE: 92 BPM | DIASTOLIC BLOOD PRESSURE: 87 MMHG | RESPIRATION RATE: 16 BRPM

## 2019-03-22 NOTE — PROGRESS NOTES
Contacted [facility] to give update of pt arrival and status. Neg Amnisure, no cervical change in over an hour. Category 1 tracing, contractions every 1-5 without pain. Doctor okay with pt returning home.

## 2019-03-22 NOTE — PROGRESS NOTES
Pt discharged to home. All belongings sent with pt. Discharged instructions reviewed. AVS signed.

## 2019-03-22 NOTE — DISCHARGE INSTRUCTIONS
Discharge Instructions for Undelivered Patients  Birthplace Phone # [phone]    Diet:  * Drink 8 to 12 glasses of liquids (milk, juice, water) every day  * You may eat meals and snacks.    Activity:  * Count fetal kicks every day (see handout).  * Call your doctor if your baby is moving less than usual.    Call your provider if you notice:  * Swelling in your face or increased swelling in your hands or legs.  * Headaches that are not relieved by Tylenol (acetaminophen).  * Changes in your vision (blurring; seeing spots or stars).  * Nausea (sick to your stomach) and vomiting (throwing up).  * Weight gain of 5 pounds per week.  * Heartburn that doesn't go away.  * Signs of bladder infection: Pain when you urinate (use the toilet), needing to go more often or more urgently.  * The bag of grace (membrane) breaks, or you notice leaking in your underwear.  * Bright red blood in your underwear.  * Abdominal (lower belly) or stomach pain.  * For first baby: Contractions (tightenings) less than 4 minutes apart for one hour or more, lasting one minute or more.  * Increase or change in vaginal discharge (note the color and amount).    Keep your next normal prenatal visit as scheduled.

## 2019-03-22 NOTE — PROGRESS NOTES
Pt arrived at 2025 with c/o of possible ROM and contractions every 3-5 min. Urine and amnisure collected and sent to lab. Category 1 tracing. Contractions every 2-4 min, lasting 50-90 sec. Pt denies any pain with contractions . SVE 2cm 90% effaced, ballotable.

## 2019-03-26 ENCOUNTER — PRENATAL OFFICE VISIT (OUTPATIENT)
Dept: OBGYN | Facility: OTHER | Age: 29
End: 2019-03-26
Attending: OBSTETRICS & GYNECOLOGY
Payer: COMMERCIAL

## 2019-03-26 VITALS
RESPIRATION RATE: 20 BRPM | BODY MASS INDEX: 27.56 KG/M2 | TEMPERATURE: 96.9 F | DIASTOLIC BLOOD PRESSURE: 82 MMHG | SYSTOLIC BLOOD PRESSURE: 112 MMHG | WEIGHT: 155.56 LBS | HEART RATE: 76 BPM

## 2019-03-26 DIAGNOSIS — Z34.03 ENCOUNTER FOR SUPERVISION OF NORMAL FIRST PREGNANCY IN THIRD TRIMESTER: Primary | ICD-10-CM

## 2019-03-26 PROCEDURE — 99207 ZZC OB VISIT-NO CHARGE - GICH ONLY: CPT | Performed by: OBSTETRICS & GYNECOLOGY

## 2019-03-26 ASSESSMENT — PAIN SCALES - GENERAL: PAINLEVEL: NO PAIN (0)

## 2019-03-26 NOTE — PROGRESS NOTES
Return OB Visit    S: Increased night time contractions. Had some leaking last week- negative Amnisure on L&D, none since. No VB. +FM.    O: /82 (BP Location: Right arm, Patient Position: Sitting, Cuff Size: Adult Regular)   Pulse 76   Temp 96.9  F (36.1  C) (Tympanic)   Resp 20   Wt 70.6 kg (155 lb 9 oz)   LMP 2018   BMI 27.56 kg/m    Gen: Well-appearing, NAD  See OB Flowsheet    A/P:  Gerda Hill is a 28 year old  at 39w0d by 7w3d US, here for return OB visit.  GERD:  omeprazole  Plans breastfeeding, no epidural  Mirena     PNC: Rh positive, Rubella immune. , GBS positive  Genetics: normal quad screen  Imaging: dating US at 7w3d, normal anatomy  Immunizations: s/p flu, Tdap  RTC weekly until delivery    Lizeth Langston MD  OB/GYN  3/26/2019 4:01 PM

## 2019-03-26 NOTE — NURSING NOTE
"Chief Complaint   Patient presents with     Prenatal Care     39w0d       Initial /82 (BP Location: Right arm, Patient Position: Sitting, Cuff Size: Adult Regular)   Pulse 76   Temp 96.9  F (36.1  C) (Tympanic)   Resp 20   Wt 70.6 kg (155 lb 9 oz)   LMP 06/18/2018   BMI 27.56 kg/m   Estimated body mass index is 27.56 kg/m  as calculated from the following:    Height as of 8/17/18: 1.6 m (5' 3\").    Weight as of this encounter: 70.6 kg (155 lb 9 oz).  Medication Reconciliation: complete    Melany Mac LPN  "

## 2019-03-28 ENCOUNTER — HOSPITAL ENCOUNTER (INPATIENT)
Facility: OTHER | Age: 29
LOS: 3 days | Discharge: HOME | End: 2019-03-31
Attending: OBSTETRICS & GYNECOLOGY | Admitting: OBSTETRICS & GYNECOLOGY
Payer: COMMERCIAL

## 2019-03-28 DIAGNOSIS — D62 ANEMIA DUE TO BLOOD LOSS, ACUTE: ICD-10-CM

## 2019-03-28 DIAGNOSIS — K21.9 GASTROESOPHAGEAL REFLUX DISEASE WITHOUT ESOPHAGITIS: ICD-10-CM

## 2019-03-28 LAB
BASOPHILS # BLD AUTO: 0 10E9/L (ref 0–0.2)
BASOPHILS NFR BLD AUTO: 0.2 %
DIFFERENTIAL METHOD BLD: ABNORMAL
EOSINOPHIL # BLD AUTO: 0.1 10E9/L (ref 0–0.7)
EOSINOPHIL NFR BLD AUTO: 0.7 %
ERYTHROCYTE [DISTWIDTH] IN BLOOD BY AUTOMATED COUNT: 12.8 % (ref 10–15)
HCT VFR BLD AUTO: 36.1 % (ref 35–47)
HGB BLD-MCNC: 12.5 G/DL (ref 11.7–15.7)
IMM GRANULOCYTES # BLD: 0.1 10E9/L (ref 0–0.4)
IMM GRANULOCYTES NFR BLD: 0.6 %
LYMPHOCYTES # BLD AUTO: 1.9 10E9/L (ref 0.8–5.3)
LYMPHOCYTES NFR BLD AUTO: 15.2 %
MCH RBC QN AUTO: 31.3 PG (ref 26.5–33)
MCHC RBC AUTO-ENTMCNC: 34.6 G/DL (ref 31.5–36.5)
MCV RBC AUTO: 90 FL (ref 78–100)
MONOCYTES # BLD AUTO: 0.9 10E9/L (ref 0–1.3)
MONOCYTES NFR BLD AUTO: 7 %
NEUTROPHILS # BLD AUTO: 9.6 10E9/L (ref 1.6–8.3)
NEUTROPHILS NFR BLD AUTO: 76.3 %
PLATELET # BLD AUTO: 226 10E9/L (ref 150–450)
RBC # BLD AUTO: 4 10E12/L (ref 3.8–5.2)
WBC # BLD AUTO: 12.6 10E9/L (ref 4–11)

## 2019-03-28 PROCEDURE — 86850 RBC ANTIBODY SCREEN: CPT | Performed by: OBSTETRICS & GYNECOLOGY

## 2019-03-28 PROCEDURE — 12000000 ZZH R&B MED SURG/OB

## 2019-03-28 PROCEDURE — G0463 HOSPITAL OUTPT CLINIC VISIT: HCPCS

## 2019-03-28 PROCEDURE — 0064U ANTB TP TOTAL&RPR IA QUAL: CPT | Performed by: OBSTETRICS & GYNECOLOGY

## 2019-03-28 PROCEDURE — 86900 BLOOD TYPING SEROLOGIC ABO: CPT | Performed by: OBSTETRICS & GYNECOLOGY

## 2019-03-28 PROCEDURE — 36415 COLL VENOUS BLD VENIPUNCTURE: CPT | Performed by: OBSTETRICS & GYNECOLOGY

## 2019-03-28 PROCEDURE — 86901 BLOOD TYPING SEROLOGIC RH(D): CPT | Performed by: OBSTETRICS & GYNECOLOGY

## 2019-03-28 PROCEDURE — 85025 COMPLETE CBC W/AUTO DIFF WBC: CPT | Performed by: OBSTETRICS & GYNECOLOGY

## 2019-03-28 RX ORDER — OXYTOCIN 10 [USP'U]/ML
10 INJECTION, SOLUTION INTRAMUSCULAR; INTRAVENOUS
Status: DISCONTINUED | OUTPATIENT
Start: 2019-03-28 | End: 2019-03-29

## 2019-03-28 RX ORDER — METHYLERGONOVINE MALEATE 0.2 MG/ML
200 INJECTION INTRAVENOUS
Status: DISCONTINUED | OUTPATIENT
Start: 2019-03-28 | End: 2019-03-29

## 2019-03-28 RX ORDER — ACETAMINOPHEN 325 MG/1
650 TABLET ORAL EVERY 4 HOURS PRN
Status: DISCONTINUED | OUTPATIENT
Start: 2019-03-28 | End: 2019-03-29

## 2019-03-28 RX ORDER — IBUPROFEN 400 MG/1
800 TABLET, FILM COATED ORAL
Status: COMPLETED | OUTPATIENT
Start: 2019-03-28 | End: 2019-03-29

## 2019-03-28 RX ORDER — NALOXONE HYDROCHLORIDE 0.4 MG/ML
.1-.4 INJECTION, SOLUTION INTRAMUSCULAR; INTRAVENOUS; SUBCUTANEOUS
Status: DISCONTINUED | OUTPATIENT
Start: 2019-03-28 | End: 2019-03-29

## 2019-03-28 RX ORDER — CARBOPROST TROMETHAMINE 250 UG/ML
250 INJECTION, SOLUTION INTRAMUSCULAR
Status: DISCONTINUED | OUTPATIENT
Start: 2019-03-28 | End: 2019-03-29

## 2019-03-28 RX ORDER — SODIUM CHLORIDE, SODIUM LACTATE, POTASSIUM CHLORIDE, CALCIUM CHLORIDE 600; 310; 30; 20 MG/100ML; MG/100ML; MG/100ML; MG/100ML
INJECTION, SOLUTION INTRAVENOUS CONTINUOUS
Status: DISCONTINUED | OUTPATIENT
Start: 2019-03-28 | End: 2019-03-29

## 2019-03-28 RX ORDER — ONDANSETRON 2 MG/ML
4 INJECTION INTRAMUSCULAR; INTRAVENOUS EVERY 6 HOURS PRN
Status: DISCONTINUED | OUTPATIENT
Start: 2019-03-28 | End: 2019-03-29

## 2019-03-28 RX ORDER — OXYCODONE AND ACETAMINOPHEN 5; 325 MG/1; MG/1
1 TABLET ORAL
Status: DISCONTINUED | OUTPATIENT
Start: 2019-03-28 | End: 2019-03-29

## 2019-03-28 ASSESSMENT — MIFFLIN-ST. JEOR: SCORE: 1402.21

## 2019-03-29 ENCOUNTER — ANESTHESIA EVENT (OUTPATIENT)
Dept: OBGYN | Facility: OTHER | Age: 29
End: 2019-03-29
Payer: COMMERCIAL

## 2019-03-29 ENCOUNTER — ANESTHESIA (OUTPATIENT)
Dept: OBGYN | Facility: OTHER | Age: 29
End: 2019-03-29
Payer: COMMERCIAL

## 2019-03-29 LAB
ABO + RH BLD: NORMAL
ABO + RH BLD: NORMAL
BLD GP AB SCN SERPL QL: NORMAL
BLOOD BANK CMNT PATIENT-IMP: NORMAL
SPECIMEN EXP DATE BLD: NORMAL

## 2019-03-29 PROCEDURE — 25800030 ZZH RX IP 258 OP 636: Performed by: FAMILY MEDICINE

## 2019-03-29 PROCEDURE — 59400 OBSTETRICAL CARE: CPT | Performed by: OBSTETRICS & GYNECOLOGY

## 2019-03-29 PROCEDURE — 37000011 ZZH ANESTHESIA WARD SERVICE

## 2019-03-29 PROCEDURE — 25000125 ZZHC RX 250: Performed by: NURSE ANESTHETIST, CERTIFIED REGISTERED

## 2019-03-29 PROCEDURE — 25000128 H RX IP 250 OP 636: Performed by: FAMILY MEDICINE

## 2019-03-29 PROCEDURE — 25800030 ZZH RX IP 258 OP 636: Performed by: OBSTETRICS & GYNECOLOGY

## 2019-03-29 PROCEDURE — 10907ZC DRAINAGE OF AMNIOTIC FLUID, THERAPEUTIC FROM PRODUCTS OF CONCEPTION, VIA NATURAL OR ARTIFICIAL OPENING: ICD-10-PCS | Performed by: OBSTETRICS & GYNECOLOGY

## 2019-03-29 PROCEDURE — 72200001 ZZH LABOR CARE VAGINAL DELIVERY SINGLE

## 2019-03-29 PROCEDURE — 25000128 H RX IP 250 OP 636: Performed by: NURSE ANESTHETIST, CERTIFIED REGISTERED

## 2019-03-29 PROCEDURE — 25000132 ZZH RX MED GY IP 250 OP 250 PS 637: Performed by: FAMILY MEDICINE

## 2019-03-29 PROCEDURE — 25000132 ZZH RX MED GY IP 250 OP 250 PS 637: Performed by: OBSTETRICS & GYNECOLOGY

## 2019-03-29 PROCEDURE — 0HQ9XZZ REPAIR PERINEUM SKIN, EXTERNAL APPROACH: ICD-10-PCS | Performed by: OBSTETRICS & GYNECOLOGY

## 2019-03-29 PROCEDURE — 25000128 H RX IP 250 OP 636: Performed by: OBSTETRICS & GYNECOLOGY

## 2019-03-29 PROCEDURE — 12000000 ZZH R&B MED SURG/OB

## 2019-03-29 RX ORDER — NALOXONE HYDROCHLORIDE 0.4 MG/ML
.1-.4 INJECTION, SOLUTION INTRAMUSCULAR; INTRAVENOUS; SUBCUTANEOUS
Status: DISCONTINUED | OUTPATIENT
Start: 2019-03-29 | End: 2019-03-31 | Stop reason: HOSPADM

## 2019-03-29 RX ORDER — HYDROCODONE BITARTRATE AND ACETAMINOPHEN 5; 325 MG/1; MG/1
1 TABLET ORAL EVERY 6 HOURS PRN
Status: DISCONTINUED | OUTPATIENT
Start: 2019-03-29 | End: 2019-03-31 | Stop reason: HOSPADM

## 2019-03-29 RX ORDER — LIDOCAINE 40 MG/G
CREAM TOPICAL
Status: DISCONTINUED | OUTPATIENT
Start: 2019-03-29 | End: 2019-03-29

## 2019-03-29 RX ORDER — AMOXICILLIN 250 MG
2 CAPSULE ORAL 2 TIMES DAILY
Status: DISCONTINUED | OUTPATIENT
Start: 2019-03-29 | End: 2019-03-31 | Stop reason: HOSPADM

## 2019-03-29 RX ORDER — OXYTOCIN 10 [USP'U]/ML
10 INJECTION, SOLUTION INTRAMUSCULAR; INTRAVENOUS
Status: DISCONTINUED | OUTPATIENT
Start: 2019-03-29 | End: 2019-03-31 | Stop reason: HOSPADM

## 2019-03-29 RX ORDER — FENTANYL CITRATE 50 UG/ML
INJECTION, SOLUTION INTRAMUSCULAR; INTRAVENOUS PRN
Status: DISCONTINUED | OUTPATIENT
Start: 2019-03-29 | End: 2019-04-01 | Stop reason: HOSPADM

## 2019-03-29 RX ORDER — BUPIVACAINE HYDROCHLORIDE 7.5 MG/ML
INJECTION, SOLUTION INTRASPINAL PRN
Status: DISCONTINUED | OUTPATIENT
Start: 2019-03-29 | End: 2019-04-01 | Stop reason: HOSPADM

## 2019-03-29 RX ORDER — ACETAMINOPHEN 325 MG/1
650 TABLET ORAL EVERY 4 HOURS PRN
Status: DISCONTINUED | OUTPATIENT
Start: 2019-03-29 | End: 2019-03-31 | Stop reason: HOSPADM

## 2019-03-29 RX ORDER — HYDROCORTISONE 2.5 %
CREAM (GRAM) TOPICAL 3 TIMES DAILY PRN
Status: DISCONTINUED | OUTPATIENT
Start: 2019-03-29 | End: 2019-03-31 | Stop reason: HOSPADM

## 2019-03-29 RX ORDER — NALOXONE HYDROCHLORIDE 0.4 MG/ML
.1-.4 INJECTION, SOLUTION INTRAMUSCULAR; INTRAVENOUS; SUBCUTANEOUS
Status: DISCONTINUED | OUTPATIENT
Start: 2019-03-29 | End: 2019-03-29

## 2019-03-29 RX ORDER — MORPHINE SULFATE 1 MG/ML
INJECTION, SOLUTION EPIDURAL; INTRATHECAL; INTRAVENOUS PRN
Status: DISCONTINUED | OUTPATIENT
Start: 2019-03-29 | End: 2019-04-01 | Stop reason: HOSPADM

## 2019-03-29 RX ORDER — BISACODYL 10 MG
10 SUPPOSITORY, RECTAL RECTAL DAILY PRN
Status: DISCONTINUED | OUTPATIENT
Start: 2019-03-31 | End: 2019-03-31 | Stop reason: HOSPADM

## 2019-03-29 RX ORDER — LANOLIN 100 %
OINTMENT (GRAM) TOPICAL
Status: DISCONTINUED | OUTPATIENT
Start: 2019-03-29 | End: 2019-03-31 | Stop reason: HOSPADM

## 2019-03-29 RX ORDER — IBUPROFEN 400 MG/1
800 TABLET, FILM COATED ORAL EVERY 6 HOURS PRN
Status: DISCONTINUED | OUTPATIENT
Start: 2019-03-29 | End: 2019-03-31 | Stop reason: HOSPADM

## 2019-03-29 RX ORDER — NALBUPHINE HYDROCHLORIDE 10 MG/ML
2.5-5 INJECTION, SOLUTION INTRAMUSCULAR; INTRAVENOUS; SUBCUTANEOUS EVERY 6 HOURS PRN
Status: DISCONTINUED | OUTPATIENT
Start: 2019-03-29 | End: 2019-03-29

## 2019-03-29 RX ORDER — LIDOCAINE HYDROCHLORIDE 10 MG/ML
INJECTION, SOLUTION INFILTRATION; PERINEURAL PRN
Status: DISCONTINUED | OUTPATIENT
Start: 2019-03-29 | End: 2019-04-01 | Stop reason: HOSPADM

## 2019-03-29 RX ORDER — FENTANYL CITRATE 50 UG/ML
50 INJECTION, SOLUTION INTRAMUSCULAR; INTRAVENOUS ONCE
Status: COMPLETED | OUTPATIENT
Start: 2019-03-29 | End: 2019-03-29

## 2019-03-29 RX ORDER — PHENYLEPHRINE HCL IN 0.9% NACL 1 MG/10 ML
100 SYRINGE (ML) INTRAVENOUS EVERY 5 MIN PRN
Status: DISCONTINUED | OUTPATIENT
Start: 2019-03-29 | End: 2019-03-29

## 2019-03-29 RX ORDER — AMOXICILLIN 250 MG
1 CAPSULE ORAL 2 TIMES DAILY
Status: DISCONTINUED | OUTPATIENT
Start: 2019-03-29 | End: 2019-03-31 | Stop reason: HOSPADM

## 2019-03-29 RX ADMIN — PROCHLORPERAZINE EDISYLATE 10 MG: 5 INJECTION INTRAMUSCULAR; INTRAVENOUS at 05:01

## 2019-03-29 RX ADMIN — ONDANSETRON 4 MG: 2 INJECTION INTRAMUSCULAR; INTRAVENOUS at 11:52

## 2019-03-29 RX ADMIN — IBUPROFEN 800 MG: 400 TABLET, FILM COATED ORAL at 14:38

## 2019-03-29 RX ADMIN — Medication 2.5 MILLION UNITS: at 07:57

## 2019-03-29 RX ADMIN — PENICILLIN G POTASSIUM 5 MILLION UNITS: 5000000 POWDER, FOR SOLUTION INTRAMUSCULAR; INTRAPLEURAL; INTRATHECAL; INTRAVENOUS at 04:35

## 2019-03-29 RX ADMIN — SODIUM CHLORIDE, POTASSIUM CHLORIDE, SODIUM LACTATE AND CALCIUM CHLORIDE 1000 ML: 600; 310; 30; 20 INJECTION, SOLUTION INTRAVENOUS at 05:34

## 2019-03-29 RX ADMIN — HYDROCODONE BITARTRATE AND ACETAMINOPHEN 1 TABLET: 5; 325 TABLET ORAL at 18:06

## 2019-03-29 RX ADMIN — LIDOCAINE HYDROCHLORIDE 30 MG: 10 INJECTION, SOLUTION INFILTRATION; PERINEURAL at 06:20

## 2019-03-29 RX ADMIN — MORPHINE SULFATE 0.1 MG: 1 INJECTION, SOLUTION EPIDURAL; INTRATHECAL; INTRAVENOUS at 06:22

## 2019-03-29 RX ADMIN — FENTANYL CITRATE 50 MCG: 50 INJECTION INTRAMUSCULAR; INTRAVENOUS at 03:38

## 2019-03-29 RX ADMIN — SODIUM CHLORIDE, POTASSIUM CHLORIDE, SODIUM LACTATE AND CALCIUM CHLORIDE: 600; 310; 30; 20 INJECTION, SOLUTION INTRAVENOUS at 06:36

## 2019-03-29 RX ADMIN — FENTANYL CITRATE 50 MCG: 50 INJECTION, SOLUTION INTRAMUSCULAR; INTRAVENOUS at 05:00

## 2019-03-29 RX ADMIN — ONDANSETRON 4 MG: 2 INJECTION INTRAMUSCULAR; INTRAVENOUS at 03:15

## 2019-03-29 RX ADMIN — BUPIVACAINE HYDROCHLORIDE IN DEXTROSE 0.5 ML: 7.5 INJECTION, SOLUTION SUBARACHNOID at 09:04

## 2019-03-29 RX ADMIN — IBUPROFEN 800 MG: 400 TABLET, FILM COATED ORAL at 21:04

## 2019-03-29 RX ADMIN — OXYTOCIN 2 MILLI-UNITS/MIN: 10 INJECTION, SOLUTION INTRAMUSCULAR; INTRAVENOUS at 07:28

## 2019-03-29 RX ADMIN — FENTANYL CITRATE 25 MCG: 50 INJECTION, SOLUTION INTRAMUSCULAR; INTRAVENOUS at 09:04

## 2019-03-29 RX ADMIN — BUPIVACAINE HYDROCHLORIDE IN DEXTROSE 0.5 ML: 7.5 INJECTION, SOLUTION SUBARACHNOID at 06:22

## 2019-03-29 RX ADMIN — SODIUM CHLORIDE, POTASSIUM CHLORIDE, SODIUM LACTATE AND CALCIUM CHLORIDE: 600; 310; 30; 20 INJECTION, SOLUTION INTRAVENOUS at 11:53

## 2019-03-29 RX ADMIN — FENTANYL CITRATE 25 MCG: 50 INJECTION, SOLUTION INTRAMUSCULAR; INTRAVENOUS at 06:22

## 2019-03-29 NOTE — ANESTHESIA PROCEDURE NOTES
Peripheral nerve/Neuraxial procedure note : intrathecal  Pre-Procedure  Performed by  Sampson Elise APRN CRNA   Location: OB    Procedure Times:3/29/2019 6:17 AM and 3/29/2019 6:23 AM  Pre-Anesthestic Checklist: patient identified, IV checked, risks and benefits discussed, informed consent, monitors and equipment checked, pre-op evaluation, at physician/surgeon's request and post-op pain management    Timeout  Correct Patient: Yes   Correct Procedure: Yes   Correct Site: Yes   Correct Laterality: Yes   Correct Position: Yes     .   Procedure Documentation  ASA 2  Diagnosis:Labor Pain.    Procedure:    Intrathecal.  Insertion Site:L3-4  (midline approach)      Patient Prep;mask, sterile gloves, chlorhexidine gluconate and isopropyl alcohol, patient draped.  .  Needle: Cintia tip Spinal Needle (gauge): 25  Spinal/LP Needle Length (inches): 3 # of attempts: 1 and # of redirects:  Introducer used Introducer: 20 G .       Assessment/Narrative  Paresthesias: No.  .  .  clear CSF fluid removed while sitting   . Time Injected: 06:22

## 2019-03-29 NOTE — PLAN OF CARE
"  Labor Pain (Labor)  Acceptable Pain Control  3/29/2019 0108 - No Change by Tamie Viera, RN  Prevent or Manage Pain  3/29/2019 0333 by Tamie Viera, RN  Note  Patient asking for something in her IV for pain.  SVE 6-7cm. Bulging bag.  Category I Tracing. CX q4-5 min.  MD notified. New order received.    3/29/2019 0108 by Tamie Viera RN  Flowsheets  Taken 3/29/2019 0108   Pain Management Interventions  breathing exercises;counterpressure applied;diversional activity encouraged;heat applied;simple massage provided;relaxation techniques promoted  Note  Patient wishes to have \"natural labor.\"  Breathing well through contractions.   doing counterpressure effectively. Patient is tolerating labor.  Discussed pain management options both pharmacological and nonpharmacological.       "

## 2019-03-29 NOTE — ANESTHESIA PROCEDURE NOTES
Peripheral nerve/Neuraxial procedure note : intrathecal  Pre-Procedure    Location:   Procedure Times:3/29/2019 9:00 AM and 3/29/2019 9:04 AM  Pre-Anesthestic Checklist: patient identified, IV checked, site marked, risks and benefits discussed, informed consent, monitors and equipment checked, pre-op evaluation, at physician/surgeon's request and post-op pain management    Timeout  Correct Patient: Yes   Correct Procedure: Yes   Correct Site: Yes   Correct Laterality: Yes   Correct Position: Yes   Site Marked: Yes   .   Procedure Documentation  ASA 2  .    Procedure:    Intrathecal.  Insertion Site:L2-3  (midline approach)      Patient Prep;chlorhexidine gluconate and isopropyl alcohol.  .  Needle: Spinal Needle (gauge): 25 Spinal/LP Needle Length (inches): 3.5 # of attempts: 1 and # of redirects:  Introducer used .       Assessment/Narrative  Paresthesias: No.  .  .  clear CSF fluid removed .

## 2019-03-29 NOTE — PROGRESS NOTES
Patient ambulated to bathroom with assist x1. Unable to void. Uterus to right side. Back to bed. Straight cathed for 1000mLs. Fundus firm, midline, U/2 following catheterization.

## 2019-03-29 NOTE — PROGRESS NOTES
Patient up to bathroom. Unable to void. Uterus U/U and off to the right. MD orders received to initiate aguilar catheter, leaving in place until a.m. Large amounts of clear yellow urine returned. Patient reports 8/10 perineal pain with activity. Orders received for Norco 5-325 mg p.o. Q6H PRN.

## 2019-03-29 NOTE — L&D DELIVERY NOTE
OB Vaginal Delivery Note    Gerda Hill MRN# [id number]   Age: 28 year old YOB: 1990       GA: 39w3d  GP:   Labor Complications: None   EBL: 200  mL  QBL:    Delivery Type: Vaginal, Spontaneous   ROM to Delivery Time: 4h 07m   Weight:      1 Minute 5 Minute 10 Minute   Apgar Totals: 9    9                Delivery Details:  Gerda Hill is a 28 year old  who presented in spontaneous labor. She progressed spontaneously to 8cm, then contractions spaced after placement of an ITN and her labor was augmented with AROM and pitocin. She pushed effectively for approximately 1 hour and delivered a vigorous female infant, SHOSHANA position, without complication. Apgars 9/9. Weight pending. First degree laceration was repaired with 3-0 vicryl. Placenta delivered spontaneously and appeared intact. .        Labor Event Times    Labor onset date:  3/28/19 Onset time:   8:30 PM   Dilation complete date:  3/29/19 Complete time:   9:30 AM   Start pushing date/time:  3/29/2019 0940      Labor Events     labor?:  No   steroids:  None  Labor Type:  AROM, Augmentation  Predominate monitoring during 1st stage:  continuous electronic fetal monitoring     Antibiotics received during labor?:  Yes  Reason for Antibiotics:  GBS  Antibiotics received for GBS:  Penicillin  Antibiotics Given (GBS):  Less than or equal to 4 hours prior to delivery     Rupture date/time: 3/29/19 0641   Rupture type:  Artificial Rupture of Membranes  Fluid color:  Clear  Fluid odor:  Normal     Augmentation:  Oxytocin  Indications for augmentation:  Ineffective Contraction Pattern  1:1 continuous labor support provided by?:  RN       Delivery/Placenta Date and Time    Delivery Date:  3/29/19 Delivery Time:  10:48 AM   Placenta Date/Time:  3/29/2019 10:51 AM  Oxytocin given at the time of delivery:  after delivery of baby     Vaginal Counts     Initial count performed by 2 team members:   Two Team Members   MOIRA  Maximiliano, ANGELIC Moore RN       [employer]   Initial counts 1 0 6 13   Added to count 0 2 0    Final counts 1 2 6 13   Placed during labor Accounted for at the end of labor   No NA   No NA   No NA    Final count performed by 2 team members:   Two Team Members   Dr. CAMERON Yeboah, ANGELIC      Final count correct?:  Yes     Apgars    Living status:  Living   1 Minute 5 Minute 10 Minute 15 Minute 20 Minute   Skin color: 1  1       Heart rate: 2  2       Reflex irritability: 2  2       Muscle tone: 2  2       Respiratory effort: 2  2       Total: 9  9       Apgars assigned by:  ROHIT CASTELLANO RN     Cord    Vessels:  3 Vessels Complications:  None   Cord Blood Disposition:  Lab Gases Sent?:  No       Resuscitation    Methods:  None   Care at Delivery:   of viable female with apgars 9 and 9. To mom's chest for skin-to-skin. Pinking up with stimulation. Bulb suction to mouth and nose. Vigorous.   Output in Delivery Room:  Voided     Skin to Skin and Feeding Plan    Skin to skin initiation date/time: 1/3/1841    Skin to skin with:  Mother  Skin to skin end date/time:        Labor Events and Shoulder Dystocia    Fetal Tracing Prior to Delivery:  Category 1  Shoulder dystocia present?:  Neg     Delivery (Maternal) (Provider to Complete) ([id number])    Episiotomy:  None  Perineal lacerations:  1st Repaired?:  Yes   Est. blood loss (mL):  200     Blood Loss  Mother: Corazon Hill #[id number]   Start of Mother's Information    IO Blood Loss  19 2030 - 19 1136    EBL (mL) Hospital Encounter 200 mL    Total  200 mL         End of Mother's Information  Mother: Corazon Hill #[id number]         Delivery - Provider to Complete ([id number])    Delivering clinician:  Lizeth Langston MD  Delivery Type (Choose the 1 that will go to the Birth History):  Vaginal, Spontaneous          Placenta    Delayed Cord Clamping:  Done  Date/Time:  3/29/2019 10:51 AM  Removal:   Spontaneous  Disposition:  Hospital disposal     Anesthesia    Method:  Intrathecal          Presentation and Position    Presentation:  Vertex  Position:  Right Occiput Anterior           Lizeth Langston MD

## 2019-03-29 NOTE — PROGRESS NOTES
Patient states she wants something else for pain control. Has been breathing through contractions and tolerating labor well to this point but growing increasingly tired.  SVE 8 cm/-1/90% with a bulging bag. Category I tracing.  Zonia every 3-5 min.

## 2019-03-29 NOTE — ANESTHESIA PREPROCEDURE EVALUATION
Anesthesia Pre-Procedure Evaluation    Patient: Gerda Hill   MRN: [id number] : 1990          Preoperative Diagnosis: * No pre-op diagnosis entered *    * No procedures listed *    History reviewed. No pertinent past medical history.  Past Surgical History:   Procedure Laterality Date     BUNIONECTOMY       SINUS SURGERY         Anesthesia Evaluation       history and physical reviewed . Pt has had prior anesthetic.     No history of anesthetic complications          ROS/MED HX    ENT/Pulmonary:  - neg pulmonary ROS     Neurologic:  - neg neurologic ROS     Cardiovascular:  - neg cardiovascular ROS       METS/Exercise Tolerance:  >4 METS   Hematologic:  - neg hematologic  ROS       Musculoskeletal:         GI/Hepatic:     (+) GERD       Renal/Genitourinary:  - ROS Renal section negative       Endo:  - neg endo ROS       Psychiatric:  - neg psychiatric ROS       Infectious Disease:  - neg infectious disease ROS       Malignancy:      - no malignancy   Other:    (+) Possibly pregnant C-spine cleared: N/A, no H/O Chronic Pain,no other significant disability                    neg OB ROS            Physical Exam  Normal systems: cardiovascular, pulmonary and dental    Airway   Mallampati: II  TM distance: > 3 FB  Neck ROM: full  Mouth opening: > 3 cm    Dental     Cardiovascular       Pulmonary             Lab Results   Component Value Date    WBC 12.6 (H) 2019    HGB 12.5 2019    HCT 36.1 2019     2019       Preop Vitals  BP Readings from Last 3 Encounters:   19 127/85   19 112/82   19 125/87    Pulse Readings from Last 3 Encounters:   19 76   19 92   19 80      Resp Readings from Last 3 Encounters:   19 20   19 20   19 16    SpO2 Readings from Last 3 Encounters:   19 99%   19 100%      Temp Readings from Last 1 Encounters:   19 96.4  F (35.8  C)    Ht Readings from Last 1 Encounters:   19 1.6 m  "(5' 3\")      Wt Readings from Last 1 Encounters:   03/28/19 70.3 kg (155 lb)    Estimated body mass index is 27.46 kg/m  as calculated from the following:    Height as of this encounter: 1.6 m (5' 3\").    Weight as of this encounter: 70.3 kg (155 lb).       Anesthesia Plan      History & Physical Review      ASA Status:  2 .    NPO Status:  > 2 hours    Plan for Spinal          Postoperative Care      Consents  Anesthetic plan, risks, benefits and alternatives discussed with:  Patient.  Use of blood products discussed: No .   .                 RAMIRO Leos CRNA  "

## 2019-03-29 NOTE — PROGRESS NOTES
Patient comfortable with IT now.  Leaking small amount of clear fluid. MD at bedside to examine patient and AROM large amount of clear fluid.  Pitocin also started as contractions are now 5-8 minutes apart but lasting 120 seconds at a time.  Hand off to oncoming shift.

## 2019-03-29 NOTE — H&P
[facility] Labor and Delivery History and Physical    Gerda Hill MRN# [id number]   Age: 28 year old YOB: 1990     Date of Admission:  3/28/2019    Primary care provider: No Ref-Primary, Physician           Chief Complaint:   Gerda Hill is a 28 year old  at 39w3d by 7w3d US admitted for labor. Contractions started yesterday afternoon and intensified during the evening hours. Presented to L&D around 11pm. Had small leakage this morning. Now has an ITN and is comfortable.          Pregnancy history:     OBSTETRIC HISTORY:    Obstetric History       T0      L0     SAB0   TAB0   Ectopic0   Multiple0   Live Births0       # Outcome Date GA Lbr Cesar/2nd Weight Sex Delivery Anes PTL Lv   1 Current                   EDC: Estimated Date of Delivery: 2019    Prenatal Labs:   Lab Results   Component Value Date    ABO B 2019    RH Pos 2019    AS Neg 2019    HEPBANG Nonreactive 2018    RPR Nonreactive 2018    RPR Nonreactive 2018    HGB 12.5 2019       GBS Status: positive    Active Problem List  Patient Active Problem List   Diagnosis     Encounter for triage in pregnant patient     Normal labor and delivery       Medication Prior to Admission  Medications Prior to Admission   Medication Sig Dispense Refill Last Dose     doxylamine (UNISOM) 25 MG TABS tablet Take 1 tablet (25 mg) by mouth At Bedtime   3/27/2019 at Unknown time     omeprazole (PRILOSEC) 40 MG DR capsule Take 1 capsule (40 mg) by mouth daily 30 capsule 1 3/27/2019 at Unknown time     Prenatal Vit-Fe Fumarate-FA (GNP PRENATAL VITAMINS) 28-0.8 MG TABS Take 1 tablet by mouth daily   3/27/2019 at Unknown time     metoclopramide (REGLAN) 10 MG tablet Take 1 tablet (10 mg) by mouth every 6 hours as needed 30 tablet 1 More than a month at Unknown time     [facility] Devices (BREAST PUMP) [facility] Dispense 1 double electric breast pump for expressing breast milk  for human infant 1 each 0      ondansetron (ZOFRAN) 4 MG tablet Take 1 tablet (4 mg) by mouth every 6 hours as needed for nausea 30 tablet 1 More than a month at Unknown time     promethazine (PHENERGAN) 25 MG Suppository Place 1 suppository (25 mg) rectally every 12 hours as needed for nausea 20 suppository 1 More than a month at Unknown time     promethazine (PHENERGAN) 25 MG tablet Take 1 tablet (25 mg) by mouth every 6 hours as needed for nausea 30 tablet 1 More than a month at Unknown time     pyridOXINE (VITAMIN  B-6) 25 MG tablet Take 1 tablet (25 mg) by mouth every morning   More than a month at Unknown time     ranitidine (ZANTAC) 300 MG tablet Take 1 tablet (300 mg) by mouth 2 times daily 120 tablet 0 More than a month at Unknown time     sucralfate (CARAFATE) 1 GM tablet Take 1 tablet (1 g) by mouth 2 times daily as needed 40 tablet 1 More than a month at Unknown time   .        Maternal Past Medical History:   History reviewed. No pertinent past medical history.  Past Surgical History:   Procedure Laterality Date     BUNIONECTOMY       SINUS SURGERY                         Family History:     Family History   Problem Relation Age of Onset     Other - See Comments Mother         colitis     Breast Cancer Maternal Grandmother         late 50s             Social History:     Social History     Tobacco Use     Smoking status: Never Smoker     Smokeless tobacco: Never Used   Substance Use Topics     Alcohol use: No            Review of Systems:   The Review of Systems is negative other than noted in the HPI          Physical Exam:     Patient Vitals for the past 8 hrs:   BP Temp SpO2   03/29/19 0641 113/79 -- 97 %   03/29/19 0637 115/78 -- 97 %   03/29/19 0636 -- -- 97 %   03/29/19 0635 115/78 -- --   03/29/19 0633 115/79 -- --   03/29/19 0320 127/85 96.4  F (35.8  C) --     Gen: resting in bed, appears comfortable  CV: RRR  Resp: CTAB  Abd: gravid, soft, nontender  Ext: nontender    Cervix:  7/90/-3  Membranes: AROM- clear fluid  EFW: 7lb  Presentation:Cephalic    Fetal Heart Rate Tracin, mod variability, + accels, no decels  Tocometer: 2 ctx in 10 min        Assessment:   Gerda Hill is a 28 year old  at 39w3d admitted with spontaneous labor.          Plan:   Labor: spontaneous progress but contractions spaced. Will start pitocin. Now s/p AROM  Pain: ITN  FWB: Cat I, reactive. EFW 7 lb  GBS positive: on PCN  Rh positive, RI  Anticipate     Lizeth Langston MD

## 2019-03-29 NOTE — PROGRESS NOTES
"Patient arrived from home at 2300 with significant other complaining of contractions since this afternoon growing longer and stronger.  Baby is active.  CX q3-5 minutes. Category I. SVE 3-4 cm/90%/-1/bulging bag. Patient is GBS positive.  Wishes to do labor/delivery \"as natural as possible.\"  MD on call notified.  Orders to admit.    "

## 2019-03-29 NOTE — PLAN OF CARE
"  Labor Pain (Labor)  Acceptable Pain Control  3/29/2019 0108 - No Change by Tamie Viera, RN  Prevent or Manage Pain  3/29/2019 0108 by Tamie Viera, RN  Flowsheets  Taken 3/29/2019 0108   Pain Management Interventions  breathing exercises;counterpressure applied;diversional activity encouraged;heat applied;simple massage provided;relaxation techniques promoted  Note  Patient wishes to have \"natural labor.\"  Breathing well through contractions.   doing counterpressure effectively. Patient is tolerating labor.  Discussed pain management options both pharmacological and nonpharmacological.  Will continue to monitor and offer encouragement of patient wishes.       "

## 2019-03-30 PROBLEM — D62 ANEMIA DUE TO BLOOD LOSS, ACUTE: Status: ACTIVE | Noted: 2019-03-30

## 2019-03-30 PROBLEM — R33.9 URINARY RETENTION: Status: ACTIVE | Noted: 2019-03-30

## 2019-03-30 LAB
HGB BLD-MCNC: 9.4 G/DL (ref 11.7–15.7)
T PALLIDUM AB SER QL: NONREACTIVE

## 2019-03-30 PROCEDURE — 85018 HEMOGLOBIN: CPT | Performed by: OBSTETRICS & GYNECOLOGY

## 2019-03-30 PROCEDURE — 12000000 ZZH R&B MED SURG/OB

## 2019-03-30 PROCEDURE — 25000131 ZZH RX MED GY IP 250 OP 636 PS 637: Performed by: FAMILY MEDICINE

## 2019-03-30 PROCEDURE — 25000132 ZZH RX MED GY IP 250 OP 250 PS 637: Performed by: FAMILY MEDICINE

## 2019-03-30 PROCEDURE — 25000132 ZZH RX MED GY IP 250 OP 250 PS 637: Performed by: OBSTETRICS & GYNECOLOGY

## 2019-03-30 RX ORDER — FERROUS GLUCONATE 324(38)MG
324 TABLET ORAL
Status: DISCONTINUED | OUTPATIENT
Start: 2019-03-30 | End: 2019-03-31 | Stop reason: HOSPADM

## 2019-03-30 RX ORDER — ONDANSETRON 4 MG/1
4 TABLET, ORALLY DISINTEGRATING ORAL EVERY 6 HOURS PRN
Status: DISCONTINUED | OUTPATIENT
Start: 2019-03-30 | End: 2019-03-31 | Stop reason: HOSPADM

## 2019-03-30 RX ADMIN — SENNOSIDES,DOCUSATE SODIUM 2 TABLET: 50; 8.6 TABLET, FILM COATED ORAL at 21:37

## 2019-03-30 RX ADMIN — IBUPROFEN 800 MG: 400 TABLET, FILM COATED ORAL at 17:31

## 2019-03-30 RX ADMIN — HYDROCODONE BITARTRATE AND ACETAMINOPHEN 1 TABLET: 5; 325 TABLET ORAL at 12:32

## 2019-03-30 RX ADMIN — IBUPROFEN 800 MG: 400 TABLET, FILM COATED ORAL at 10:40

## 2019-03-30 RX ADMIN — ONDANSETRON 4 MG: 4 TABLET, ORALLY DISINTEGRATING ORAL at 17:53

## 2019-03-30 RX ADMIN — HYDROCODONE BITARTRATE AND ACETAMINOPHEN 1 TABLET: 5; 325 TABLET ORAL at 06:19

## 2019-03-30 RX ADMIN — HYDROCODONE BITARTRATE AND ACETAMINOPHEN 1 TABLET: 5; 325 TABLET ORAL at 20:58

## 2019-03-30 RX ADMIN — HYDROCODONE BITARTRATE AND ACETAMINOPHEN 1 TABLET: 5; 325 TABLET ORAL at 00:17

## 2019-03-30 RX ADMIN — FERROUS GLUCONATE 324 MG: 324 TABLET ORAL at 10:40

## 2019-03-30 RX ADMIN — IBUPROFEN 800 MG: 400 TABLET, FILM COATED ORAL at 03:51

## 2019-03-30 RX ADMIN — SENNOSIDES,DOCUSATE SODIUM 2 TABLET: 50; 8.6 TABLET, FILM COATED ORAL at 10:40

## 2019-03-30 NOTE — PLAN OF CARE
VSS. PRN Ibuprofen and Norco administered for perineal soreness rated 6/10 with some relief, see MAR. Ice packs applied and patient assisted with jennifer cares. Fundus midline, firm, and U/1. Moderated bleeding noted on jennifer pad, no clots. Patient voiding without difficulty today following removal of Morelos this a.m. Patient reporting mild nausea. Order received for 4 mg Zofran p.o. Q6H PRN. Family present at the bedside throughout shift.

## 2019-03-30 NOTE — PROGRESS NOTES
Patients vitals remain stable. Pain 0-4/10 in perineal area. Moderate lochia with one moderate sized clot noted. Fundus firm and U/1. Ice packs to perineum. Norco and Ibuprofen alternated for pain management. Morelos to be removed at 0600. Appears to be bonding well with babe. Attentive to cares and independently putting babe to breast. Spouse rooming in. Will continue to monitor and implement interventions as needed.   Rossana Murray RN on 3/30/2019 at 5:53 AM

## 2019-03-30 NOTE — PROGRESS NOTES
[facility] Obstetrics Post-Partum Progress Note          Assessment and Plan:    Assessment:   Post-partum day #1  Normal spontaneous vaginal delivery  Urinary retention - resolved  Acute blood loss anemia - mild  L&D complications: None      Doing well.  No excessive bleeding  Pain well-controlled.      Plan:   Ambulation encouraged  Breast feeding strategies discussed  Start iron therapy           Interval History:   Patient had aguilar removed this morning and is able to void. Pain is well-controlled.  No fevers.  No history of foul-smelling vaginal discharge.  Good appetite.  Denies chest pain, shortness of breath.  She had nausea and vomiting related to IT, resolved. Vaginal bleeding is similar to a heavy menstrual flow.  Ambulatory.  Breastfeeding well - better than yesterday               Review of Systems:    The patient denies any chest pain, shortness of breath, excessive pain, fever, chills, purulent drainage from the wound, nausea or vomiting.          Medications:   All medications related to the patient's surgery have been reviewed          Physical Exam:   All vitals stable  Uterine fundus is firm, non-tender and at the level of the umbilicus          Data:     Results for orders placed or performed during the hospital encounter of 03/28/19   CBC with platelets differential   Result Value Ref Range    WBC 12.6 (H) 4.0 - 11.0 10e9/L    RBC Count 4.00 3.8 - 5.2 10e12/L    Hemoglobin 12.5 11.7 - 15.7 g/dL    Hematocrit 36.1 35.0 - 47.0 %    MCV 90 78 - 100 fl    MCH 31.3 26.5 - 33.0 pg    MCHC 34.6 31.5 - 36.5 g/dL    RDW 12.8 10.0 - 15.0 %    Platelet Count 226 150 - 450 10e9/L    Diff Method Automated Method     % Neutrophils 76.3 %    % Lymphocytes 15.2 %    % Monocytes 7.0 %    % Eosinophils 0.7 %    % Basophils 0.2 %    % Immature Granulocytes 0.6 %    Absolute Neutrophil 9.6 (H) 1.6 - 8.3 10e9/L    Absolute Lymphocytes 1.9 0.8 - 5.3 10e9/L    Absolute Monocytes 0.9 0.0 - 1.3 10e9/L    Absolute  Eosinophils 0.1 0.0 - 0.7 10e9/L    Absolute Basophils 0.0 0.0 - 0.2 10e9/L    Abs Immature Granulocytes 0.1 0 - 0.4 10e9/L   Hemoglobin   Result Value Ref Range    Hemoglobin 9.4 (L) 11.7 - 15.7 g/dL   ABO/Rh type and screen   Result Value Ref Range    ABO B     RH(D) Pos     Antibody Screen Neg     Test Valid Only At [facility]        Specimen Expires 03/31/2019          SERINA CARPIO MD

## 2019-03-31 VITALS
BODY MASS INDEX: 27.46 KG/M2 | SYSTOLIC BLOOD PRESSURE: 113 MMHG | RESPIRATION RATE: 18 BRPM | DIASTOLIC BLOOD PRESSURE: 76 MMHG | TEMPERATURE: 97.7 F | OXYGEN SATURATION: 98 % | WEIGHT: 155 LBS | HEIGHT: 63 IN

## 2019-03-31 PROCEDURE — 25000131 ZZH RX MED GY IP 250 OP 636 PS 637: Performed by: FAMILY MEDICINE

## 2019-03-31 PROCEDURE — 25000132 ZZH RX MED GY IP 250 OP 250 PS 637: Performed by: OBSTETRICS & GYNECOLOGY

## 2019-03-31 PROCEDURE — 25000132 ZZH RX MED GY IP 250 OP 250 PS 637: Performed by: FAMILY MEDICINE

## 2019-03-31 RX ORDER — FERROUS GLUCONATE 324(38)MG
324 TABLET ORAL
Qty: 30 TABLET | Refills: 0 | Status: SHIPPED | OUTPATIENT
Start: 2019-03-31 | End: 2019-06-05

## 2019-03-31 RX ORDER — DOCUSATE SODIUM 100 MG/1
100 CAPSULE, LIQUID FILLED ORAL 2 TIMES DAILY PRN
Qty: 60 CAPSULE | Refills: 1 | Status: SHIPPED | OUTPATIENT
Start: 2019-03-31 | End: 2019-06-05

## 2019-03-31 RX ORDER — IBUPROFEN 600 MG/1
600 TABLET, FILM COATED ORAL EVERY 6 HOURS PRN
Qty: 20 TABLET | Refills: 0 | Status: ON HOLD | OUTPATIENT
Start: 2019-03-31 | End: 2022-10-28

## 2019-03-31 RX ORDER — ONDANSETRON 4 MG/1
4 TABLET, ORALLY DISINTEGRATING ORAL EVERY 6 HOURS PRN
Qty: 6 TABLET | Refills: 0 | Status: SHIPPED | OUTPATIENT
Start: 2019-03-31 | End: 2019-05-10

## 2019-03-31 RX ADMIN — HYDROCODONE BITARTRATE AND ACETAMINOPHEN 1 TABLET: 5; 325 TABLET ORAL at 03:05

## 2019-03-31 RX ADMIN — SENNOSIDES,DOCUSATE SODIUM 2 TABLET: 50; 8.6 TABLET, FILM COATED ORAL at 09:50

## 2019-03-31 RX ADMIN — FERROUS GLUCONATE 324 MG: 324 TABLET ORAL at 09:50

## 2019-03-31 RX ADMIN — ONDANSETRON 4 MG: 4 TABLET, ORALLY DISINTEGRATING ORAL at 08:03

## 2019-03-31 RX ADMIN — IBUPROFEN 800 MG: 400 TABLET, FILM COATED ORAL at 06:01

## 2019-03-31 RX ADMIN — IBUPROFEN 800 MG: 400 TABLET, FILM COATED ORAL at 00:03

## 2019-03-31 RX ADMIN — HYDROCODONE BITARTRATE AND ACETAMINOPHEN 1 TABLET: 5; 325 TABLET ORAL at 09:50

## 2019-03-31 NOTE — DISCHARGE SUMMARY
[facility]  Discharge Summary    Gerda Hill MRN# [id number]   Age: 28 year old YOB: 1990     Date of Admission:  3/28/2019  Date of Discharge::  3/31/2019  Admitting Physician:  Lizeth Langston MD  Discharge Physician:  SERINA CARPIO MD     Home clinic: [facility]           Admission Diagnoses:   Normal labor and delivery   (spontaneous vaginal delivery)            Discharge Diagnosis:   Normal spontaneous vaginal delivery  Intrauterine pregnancy at 39w3 weeks gestation  Acute blood loss anemia, asymptomatic  Urinary retention as side effect of IT  Nausea and vomiting as side effect of IT            Procedures:   ITN x2                   Medications Prior to Admission:     Medications Prior to Admission   Medication Sig Dispense Refill Last Dose     Prenatal Vit-Fe Fumarate-FA (GNP PRENATAL VITAMINS) 28-0.8 MG TABS Take 1 tablet by mouth daily   3/27/2019 at Unknown time     Misc. Devices (BREAST PUMP) MIS Dispense 1 double electric breast pump for expressing breast milk for human infant 1 each 0      ondansetron (ZOFRAN) 4 MG tablet Take 1 tablet (4 mg) by mouth every 6 hours as needed for nausea 30 tablet 1 More than a month at Unknown time     [DISCONTINUED] doxylamine (UNISOM) 25 MG TABS tablet Take 1 tablet (25 mg) by mouth At Bedtime   3/27/2019 at Unknown time     [DISCONTINUED] metoclopramide (REGLAN) 10 MG tablet Take 1 tablet (10 mg) by mouth every 6 hours as needed 30 tablet 1 More than a month at Unknown time     [DISCONTINUED] omeprazole (PRILOSEC) 40 MG DR capsule Take 1 capsule (40 mg) by mouth daily 30 capsule 1 3/27/2019 at Unknown time     [DISCONTINUED] promethazine (PHENERGAN) 25 MG Suppository Place 1 suppository (25 mg) rectally every 12 hours as needed for nausea 20 suppository 1 More than a month at Unknown time     [DISCONTINUED] promethazine (PHENERGAN) 25 MG tablet Take 1 tablet (25 mg) by mouth every 6  hours as needed for nausea 30 tablet 1 More than a month at Unknown time     [DISCONTINUED] pyridOXINE (VITAMIN  B-6) 25 MG tablet Take 1 tablet (25 mg) by mouth every morning   More than a month at Unknown time     [DISCONTINUED] sucralfate (CARAFATE) 1 GM tablet Take 1 tablet (1 g) by mouth 2 times daily as needed 40 tablet 1 More than a month at Unknown time             Discharge Medications:     Current Discharge Medication List      START taking these medications    Details   docusate sodium (COLACE) 100 MG capsule Take 1 capsule (100 mg) by mouth 2 times daily as needed for constipation  Qty: 60 capsule, Refills: 1    Associated Diagnoses:  (spontaneous vaginal delivery)      ferrous gluconate (FERGON) 324 (38 Fe) MG tablet Take 1 tablet (324 mg) by mouth daily (with breakfast)  Qty: 30 tablet, Refills: 0    Associated Diagnoses: Anemia due to blood loss, acute      ibuprofen (ADVIL/MOTRIN) 600 MG tablet Take 1 tablet (600 mg) by mouth every 6 hours as needed for other (cramping)  Qty: 20 tablet, Refills: 0    Associated Diagnoses:  (spontaneous vaginal delivery)      ondansetron (ZOFRAN-ODT) 4 MG ODT tab Take 1 tablet (4 mg) by mouth every 6 hours as needed for nausea or vomiting  Qty: 6 tablet, Refills: 0    Associated Diagnoses:  (spontaneous vaginal delivery)         CONTINUE these medications which have CHANGED    Details   ranitidine (ZANTAC) 300 MG tablet Take 1 tablet (300 mg) by mouth 2 times daily as needed for heartburn  Qty: 120 tablet, Refills: 0    Associated Diagnoses: Gastroesophageal reflux disease without esophagitis         CONTINUE these medications which have NOT CHANGED    Details   Prenatal Vit-Fe Fumarate-FA (GNP PRENATAL VITAMINS) 28-0.8 MG TABS Take 1 tablet by mouth daily      Misc. Devices (BREAST PUMP) MISC Dispense 1 double electric breast pump for expressing breast milk for human infant  Qty: 1 each, Refills: 0    Associated Diagnoses: Breast feeding status of mother       ondansetron (ZOFRAN) 4 MG tablet Take 1 tablet (4 mg) by mouth every 6 hours as needed for nausea  Qty: 30 tablet, Refills: 1    Associated Diagnoses: Nausea/vomiting in pregnancy         STOP taking these medications       doxylamine (UNISOM) 25 MG TABS tablet Comments:   Reason for Stopping:         metoclopramide (REGLAN) 10 MG tablet Comments:   Reason for Stopping:         omeprazole (PRILOSEC) 40 MG DR capsule Comments:   Reason for Stopping:         promethazine (PHENERGAN) 25 MG Suppository Comments:   Reason for Stopping:         promethazine (PHENERGAN) 25 MG tablet Comments:   Reason for Stopping:         pyridOXINE (VITAMIN  B-6) 25 MG tablet Comments:   Reason for Stopping:         sucralfate (CARAFATE) 1 GM tablet Comments:   Reason for Stopping:                       Brief History of Labor:   Gerda Hill is a 28 year old female  at 39w3d presented in active labor.  Received ITN x2 for analgesia.  Progressed thru labor until 8cm, then pitocin augmentation started.  1 hour second stage of labor.  Delivered female infant with apgars of 9 and 9.  1st degree tear.           Hospital Course:   The patient's hospital course was notable for nausea and vomiting, thought to be due to ITN.  She also had urinary retention due to her ITN, required aguilar for 12 hours. On discharge, her pain was well controlled. Vaginal bleeding is similar to peak menstrual flow with moderate cramping.  Iron supplementation started for hemoglobin under 10.  Voiding without difficulty.  Ambulating well and tolerating a normal diet.  No fever.  Breastfeeding is gonig well.  Infant is stable.  No bowel movement yet.  She was discharged on post-partum day #2.    Post-partum hemoglobin:   Hemoglobin   Date Value Ref Range Status   2019 9.4 (L) 11.7 - 15.7 g/dL Final             Discharge Instructions and Follow-Up:   Discharge diet: Regular   Discharge activity: Activity as tolerated   Discharge follow-up: Follow up  with Dr. Patel in 6 weeks   Wound care: Ice to area for comfort           Discharge Disposition:   Discharged to home          SERINA CARPIO MD

## 2019-03-31 NOTE — DISCHARGE INSTRUCTIONS
If BREASTFEEDING;  Have a nodule in your breast (Blocked ducts)  *use moist heat and gentle breast massage before feedings (try this in the shower)  *Nurse the baby frequently on the plugged duct side  *Nurse with the baby's chin pointing in the direction of the blocked duct to help drain the affected area  *Massage breast down toward the nipple to help your milk flow when infant nursing  *Rest as much as possible     MASTITIS  Breast infection. Symptoms include a painful, firm red area on your breast, a fever and flu-like symptoms. Nipple damage, infrequent or poor breastfeeding, blocked ducts can cause mastitis.   *Call your healthcare provider  *Take your full coarse of antibiotics  *Rest and drink fluids  *Continue feeding baby from both breasts  *If too painful pump and cup feed infant  *Use heat for comfort  *Get advice from your lactation consultant    ENGORGEMENT  Milk production increases. Your breast become zhong, heavier, and firmer. Nursing often and draining your breasts regularly can help prevent excessive swelling. Ice may feel comfortable. Wear a bra with good support and easy to open cups.Take a refridgerated cabbage, break the veins of a leaf and place to breast for swelling.  May need to pump slightly to relieve your pain for comfort and cup feeding it to your infant.    SORE NIPPLES  *Wash your hands before touching breast    *Start feeding on the breast that is the least sore, move to the other side once your milk has let down  *Make sure infant is tummy to tummy with you and she is hugging your breat with a deep latch (fish lipped)  *May need to pump and cup feed infant  *Nipple shield   *Warm pack or gel packs for comfort  *Rub breast milk into sore nipple-air dry  *Place a wet peppermint/spearmint tea bag to nipple

## 2019-03-31 NOTE — PROGRESS NOTES
NSG DISCHARGE NOTE    Patient discharged to home at 1100 AM via ambulation. Accompanied by spouse and staff. Discharge instructions reviewed with patient and spouse, opportunity offered to ask questions. Prescriptions sent to patients preferred pharmacy. All belongings sent with patient.    Sera Yeboah

## 2019-03-31 NOTE — PLAN OF CARE
Assessments completed as charted. B/P: 118/81, T: 97.7, P: Data Unavailable, R: 18. See flow sheets for pain assessments and interventions. . Voiding without difficulty. Fundus: Midline U/2. Lochia: Light. Activity: unrestricted with out pain . Infant feeding: Breast feeding going well.     LATCH Score:   Latch: 2 - Good Latch  Audible Swallowin - Spontaneous & frequent  Type of Nipple: (Breast/Nipple) 2 - Everted  Comfort: 1 - Filling, small blisters, mild/mod pain  Hold: 1 - Min. Assist   Total LATCH Score: 8  Mother requesting assistance with breast feeding latch   Postpartum breastfeeding assessment completed and education provided, see Patient Education Activity.  Items included in the education are:   proper positioning and latch  effectiveness of feeding  manual expression   sign/symptoms of infant feeding issues requiring referral to qualified health care professional   Postpartum care education provided, see Patient Education activity. Patient denies needs. Will monitor.  Luci Min RN

## 2019-04-01 ENCOUNTER — HOSPITAL ENCOUNTER (OUTPATIENT)
Dept: OBGYN | Facility: OTHER | Age: 29
End: 2019-04-01
Attending: OBSTETRICS & GYNECOLOGY
Payer: COMMERCIAL

## 2019-04-01 ENCOUNTER — LACTATION ENCOUNTER (OUTPATIENT)
Age: 29
End: 2019-04-01

## 2019-04-01 PROCEDURE — S9443 LACTATION CLASS: HCPCS | Performed by: REGISTERED NURSE

## 2019-04-01 NOTE — LACTATION NOTE
This note was copied from a baby's chart.  Outpatient Lactation Visit    Erin Hill  [id number]    Consultation Date: 2019     Reason for Lactation Referral: Initial Lactation Consult    Baby's : 3/29/2019    Baby's Current Age: 3 day old  Baby's Gestational Age: Gestational Age: 39w3d    Primary Care Provider: No Ref-Primary, Physician    Presenting Problem (concerns as stated by parent): no concerns    MATERNAL HISTORY   History of Breast Surgery: no  Breast Changes During Pregnancy: no  Breast Feeding History: primigravida  Maternal Meds: daily prenatal vitamin  Pregnancy Complications: none  Anesthesia during labor: intrathecal    MATERNAL ASSESSMENT    Breast Size: average, symmetrical, soft after feeding and filling prior to feeding  Nipple Appearance - Left: slightly cracked, with signs of healing, education on further healing techniques provided  Nipple Appearance - Right: slightly cracked, with signs of healing, education on further healing techniques provided  Nipple Erectility - Left: erect with stimulation  Nipple Erectility - Right: erect with stimulation  Areolas Compressibility: soft  Nipple Size: average  Special Equipment Used: none  Day mother reports milk came in:  Day 3    INFANT ASSESSMENT    Oral Anatomy  Mouth: normal  Palate: normal  Jaw: normal  Tongue: normal  Frenulum: normal   Digital Suck Exam: root    FEEDING   Feeding Time: aggressively for 15 minutes  Position:  football  Effort to Latch: awake and alert, latched easily  Duration of Breast Feeding: Right Breast: 0; Left Breast: 15 minutes  Results: excellent breast feed    Volume of Intake:    Birth Weight: 7 lb 2.4 oz    Hospital discharge weight: 6 lb 11.1 oz    Today's Weight 6 lb 5.3 oz    Total Intake: 0.5 oz (milk just coming in today)  Output: 2 soil diapers in last 24 hours, 3-4 wet diapers in last 24 hours    LATCH Score:   Latch: 2 - Good Latch  Audible Swallowin - Spontaneous & frequent  Type of Nipple:  (Breast/Nipple) 2 - Everted  Comfort: 2 - Soft, Nontender  Hold: 2 - No Assist   Total LATCH Score:  10    FEEDING PLAN    Home Feeding Plan: Continue to feed on demand when  elicits feeding cues with deep latch.  Babe should be eating 8-12 times in a 24 hour period.  Exclusivity explained and encouraged in the early weeks to establish breastfeeding and order in milk supply.  Rooming-in encouraged with explanation of the benefits.  Continue to apply expressed breast milk and Lanolin cream to nipples after feedings for healing and comfort.  Postpartum breastfeeding assessment completed and education provided.  Items included in the education are:     proper positioning and latch    effectiveness of feeding    manual expression    handling and storing breastmilk    maintenance of breastfeeding for the first 6 months    sign/symptoms of infant feeding issues requiring referral to qualified health care provider    LACTATION COMMENTS   Deep latch explained for proper positioning of breast in infant's mouth, maximizing milk transfer and comfort.  Reassurance and encouragement provided in regard to mom's concerns about milk supply.  Follow-up support information provided.  Parents plan to keep  Well-Child Check with Dr. Delgado as scheduled for 2 week well child check.      Face-to-face Time: 60 minutes with assessment and education.    Kathya Jenkins  2019  1:53 PM

## 2019-05-10 ENCOUNTER — PRENATAL OFFICE VISIT (OUTPATIENT)
Dept: OBGYN | Facility: OTHER | Age: 29
End: 2019-05-10
Attending: OBSTETRICS & GYNECOLOGY
Payer: COMMERCIAL

## 2019-05-10 VITALS
SYSTOLIC BLOOD PRESSURE: 102 MMHG | DIASTOLIC BLOOD PRESSURE: 60 MMHG | HEART RATE: 60 BPM | BODY MASS INDEX: 23.79 KG/M2 | WEIGHT: 134.31 LBS

## 2019-05-10 DIAGNOSIS — Z30.430 ENCOUNTER FOR IUD INSERTION: ICD-10-CM

## 2019-05-10 LAB — HCG UR QL: NEGATIVE

## 2019-05-10 PROCEDURE — 81025 URINE PREGNANCY TEST: CPT | Mod: ZL | Performed by: OBSTETRICS & GYNECOLOGY

## 2019-05-10 PROCEDURE — 99207 ZZC POST-PARTUM 6 WK VISIT - GICH ONLY: CPT | Performed by: OBSTETRICS & GYNECOLOGY

## 2019-05-10 PROCEDURE — 25000125 ZZHC RX 250: Performed by: OBSTETRICS & GYNECOLOGY

## 2019-05-10 PROCEDURE — 58300 INSERT INTRAUTERINE DEVICE: CPT | Performed by: OBSTETRICS & GYNECOLOGY

## 2019-05-10 RX ADMIN — LEVONORGESTREL 20 MCG: 52 INTRAUTERINE DEVICE INTRAUTERINE at 12:56

## 2019-05-10 ASSESSMENT — ANXIETY QUESTIONNAIRES
5. BEING SO RESTLESS THAT IT IS HARD TO SIT STILL: NOT AT ALL
GAD7 TOTAL SCORE: 5
1. FEELING NERVOUS, ANXIOUS, OR ON EDGE: NOT AT ALL
IF YOU CHECKED OFF ANY PROBLEMS ON THIS QUESTIONNAIRE, HOW DIFFICULT HAVE THESE PROBLEMS MADE IT FOR YOU TO DO YOUR WORK, TAKE CARE OF THINGS AT HOME, OR GET ALONG WITH OTHER PEOPLE: NOT DIFFICULT AT ALL
2. NOT BEING ABLE TO STOP OR CONTROL WORRYING: NOT AT ALL
3. WORRYING TOO MUCH ABOUT DIFFERENT THINGS: SEVERAL DAYS
6. BECOMING EASILY ANNOYED OR IRRITABLE: MORE THAN HALF THE DAYS
7. FEELING AFRAID AS IF SOMETHING AWFUL MIGHT HAPPEN: SEVERAL DAYS

## 2019-05-10 ASSESSMENT — PAIN SCALES - GENERAL: PAINLEVEL: NO PAIN (0)

## 2019-05-10 ASSESSMENT — PATIENT HEALTH QUESTIONNAIRE - PHQ9: 5. POOR APPETITE OR OVEREATING: SEVERAL DAYS

## 2019-05-10 NOTE — PROGRESS NOTES
"6 week Postpartum Visit Note    S:  Ms. Gerda Hill is a 28 year old  here for her 6-week postpartum checkup.   - Had a  on 3/29/19.  - Infant gender:  girl, weight 7 pounds 2.4 oz.  - Feeding Method:  .  Complications reported with feeding:  Hungry baby- has been supplementing.    - Bleeding:  None.  Duration:  Unsure when it stopped.  Menses resumed:  No  - Bowel/Urinary problems:  No  - Mood: okay. Has been up and down but getting better.     - Contraception Planned:  Mirena  - She tried having intercourse- stopped because she it was too painful. Did not use lubricants.  - Current tobacco use:  No  - Hx of Abuse:  No  ================================================================  ROS: 10 point ROS neg other than the symptoms noted above in the HPI.     O:  /60 (BP Location: Right arm, Patient Position: Sitting, Cuff Size: Adult Regular)   Pulse 60   Wt 60.9 kg (134 lb 5 oz)   LMP 2018   Breastfeeding? Yes   BMI 23.79 kg/m    Gen: Well-appearing, NAD  Psych:  Somewhat tearful  Breast: deferred  Abd:  Benign, Soft, flat, non-tender and No masses, organomegaly  Pelvic: Well healed perineum. Normal vagina and cervix. Uterus normal size, retroverted. No adnexal masses.    IUD Insertion Note:      Time Out - \"Pause for the Cause\"  Just before the procedure begins, through verbal and active participation of team members, verify:    Initials   Patient Name    Gerda Hill    Patient Date of Birth 1990    Procedure to be performed  IUD insertion     Consent:  Risks, benefits of treatment, and no treatment were discussed.  Patient's questions were elicited and answered.     After informed consent was obtained from the patient, a speculum was placed in the vagina to visualized the cervix.  The cervix was then swabbed with a betadine prep x 3.   Tenaculum was placed at the 12 o'clock position on the cervix and the uterus sounded to 7cm.  The Mirena  IUD was then placed in " the usual fashion under sterile technique.  Strings were clipped about 2 cm from the cervical os.  Tenaculum was removed and cervix was hemostatic.  There were no complications.  The patient tolerated the procedure well.    Lot [id number]  Exp: Oct 2021    A/P:  Ms. Gerda Hill is a 28 year old  here for 6 week postpartum visit after . Doing well overall. Discussed postpartum depression in detail  - Contraception: Mirena  - Feeding: breast  - Follow-up: 1 month for IUD check    Lizeth Langston MD  OB/GYN  5/10/2019 11:07 AM

## 2019-05-10 NOTE — NURSING NOTE
"Chief Complaint   Patient presents with     Postpartum Care       Initial /60 (BP Location: Right arm, Patient Position: Sitting, Cuff Size: Adult Regular)   Pulse 60   Wt 60.9 kg (134 lb 5 oz)   LMP 06/18/2018   Breastfeeding? Yes   BMI 23.79 kg/m   Estimated body mass index is 23.79 kg/m  as calculated from the following:    Height as of 3/28/19: 1.6 m (5' 3\").    Weight as of this encounter: 60.9 kg (134 lb 5 oz).  Medication Reconciliation: rupinder Mac LPN     Prior to the start of the procedure and with procedural staff participation, I verbally confirmed the patient s identity using two indicators, relevant allergies, that the procedure was appropriate and matched the consent or emergent situation, and that the correct equipment/implants were available. Immediately prior to starting the procedure I conducted the Time Out with the procedural staff and re-confirmed the patient s name, procedure, and site/side. (The Joint Commission universal protocol was followed.)  Yes    Sedation (Moderate or Deep): None    "

## 2019-05-11 ASSESSMENT — ANXIETY QUESTIONNAIRES: GAD7 TOTAL SCORE: 5

## 2019-06-05 ENCOUNTER — OFFICE VISIT (OUTPATIENT)
Dept: OBGYN | Facility: OTHER | Age: 29
End: 2019-06-05
Attending: OBSTETRICS & GYNECOLOGY
Payer: COMMERCIAL

## 2019-06-05 VITALS
HEART RATE: 60 BPM | SYSTOLIC BLOOD PRESSURE: 106 MMHG | BODY MASS INDEX: 23.47 KG/M2 | DIASTOLIC BLOOD PRESSURE: 60 MMHG | WEIGHT: 132.5 LBS

## 2019-06-05 DIAGNOSIS — Z97.5 IUD (INTRAUTERINE DEVICE) IN PLACE: Primary | ICD-10-CM

## 2019-06-05 PROCEDURE — 99213 OFFICE O/P EST LOW 20 MIN: CPT | Performed by: OBSTETRICS & GYNECOLOGY

## 2019-06-05 ASSESSMENT — PATIENT HEALTH QUESTIONNAIRE - PHQ9
SUM OF ALL RESPONSES TO PHQ QUESTIONS 1-9: 0
5. POOR APPETITE OR OVEREATING: NOT AT ALL

## 2019-06-05 ASSESSMENT — ANXIETY QUESTIONNAIRES
7. FEELING AFRAID AS IF SOMETHING AWFUL MIGHT HAPPEN: NOT AT ALL
2. NOT BEING ABLE TO STOP OR CONTROL WORRYING: NOT AT ALL
5. BEING SO RESTLESS THAT IT IS HARD TO SIT STILL: NOT AT ALL
IF YOU CHECKED OFF ANY PROBLEMS ON THIS QUESTIONNAIRE, HOW DIFFICULT HAVE THESE PROBLEMS MADE IT FOR YOU TO DO YOUR WORK, TAKE CARE OF THINGS AT HOME, OR GET ALONG WITH OTHER PEOPLE: NOT DIFFICULT AT ALL
6. BECOMING EASILY ANNOYED OR IRRITABLE: NOT AT ALL
1. FEELING NERVOUS, ANXIOUS, OR ON EDGE: NOT AT ALL
3. WORRYING TOO MUCH ABOUT DIFFERENT THINGS: NOT AT ALL
GAD7 TOTAL SCORE: 0

## 2019-06-05 ASSESSMENT — PAIN SCALES - GENERAL: PAINLEVEL: NO PAIN (0)

## 2019-06-05 NOTE — PROGRESS NOTES
Follow-Up Visit    S: Ms. Gerda Hill is a 28 year old  here for IUD check. She had a Mirena placed on 5/10/19. She reports continued spotting but getting better. Mood has been much better.     O:  /60 (BP Location: Right arm, Patient Position: Sitting, Cuff Size: Adult Regular)   Pulse 60   Wt 60.1 kg (132 lb 8 oz)   BMI 23.47 kg/m    Gen: Well-appearing, NAD  Pulm: nonlabored  Psych: appropriate mood and affect    Pelvic:  Normal appearing external female genitalia. Normal hair distribution. Vagina is without lesions. Minimal brown discharge. Cervix normal, IUD strings appear appropriate length    A/P:  Ms. Gerda Hill is a 28 year old  here for IUD check. Discussed expected bleeding patterns.   - RTC 1 year for IUD check or sooner dawson Langston MD  OB/GYN  2019 1:16 PM

## 2019-06-05 NOTE — NURSING NOTE
"Chief Complaint   Patient presents with     RECHECK     iud check       Initial /60 (BP Location: Right arm, Patient Position: Sitting, Cuff Size: Adult Regular)   Pulse 60   Wt 60.1 kg (132 lb 8 oz)   BMI 23.47 kg/m   Estimated body mass index is 23.47 kg/m  as calculated from the following:    Height as of 3/28/19: 1.6 m (5' 3\").    Weight as of this encounter: 60.1 kg (132 lb 8 oz).  Medication Reconciliation: complete    Melany Mac LPN  "

## 2019-06-06 ASSESSMENT — ANXIETY QUESTIONNAIRES: GAD7 TOTAL SCORE: 0

## 2020-03-11 ENCOUNTER — HEALTH MAINTENANCE LETTER (OUTPATIENT)
Age: 30
End: 2020-03-11

## 2020-07-29 ENCOUNTER — ALLIED HEALTH/NURSE VISIT (OUTPATIENT)
Dept: FAMILY MEDICINE | Facility: OTHER | Age: 30
End: 2020-07-29
Payer: COMMERCIAL

## 2020-07-29 DIAGNOSIS — Z11.1 SCREENING EXAMINATION FOR PULMONARY TUBERCULOSIS: Primary | ICD-10-CM

## 2020-07-29 PROCEDURE — 86580 TB INTRADERMAL TEST: CPT | Performed by: REGISTERED NURSE

## 2020-07-29 NOTE — PROGRESS NOTES

## 2020-07-31 ENCOUNTER — ALLIED HEALTH/NURSE VISIT (OUTPATIENT)
Dept: FAMILY MEDICINE | Facility: OTHER | Age: 30
End: 2020-07-31
Payer: COMMERCIAL

## 2020-07-31 DIAGNOSIS — Z11.1 SCREENING EXAMINATION FOR PULMONARY TUBERCULOSIS: Primary | ICD-10-CM

## 2020-07-31 LAB
PPDINDURATION: 0 MM (ref 0–5)
PPDREDNESS: 0 MM

## 2020-07-31 PROCEDURE — 99207 ZZC NO CHARGE NURSE ONLY: CPT

## 2020-07-31 NOTE — PROGRESS NOTES
Mantoux results: No induration.  No swelling.  No redness.    Genesis Dyson RN, BSN on 7/31/2020 at 9:38 AM

## 2020-08-12 ENCOUNTER — ALLIED HEALTH/NURSE VISIT (OUTPATIENT)
Dept: FAMILY MEDICINE | Facility: OTHER | Age: 30
End: 2020-08-12
Payer: COMMERCIAL

## 2020-08-12 DIAGNOSIS — Z11.1 SCREENING EXAMINATION FOR PULMONARY TUBERCULOSIS: Primary | ICD-10-CM

## 2020-08-12 PROCEDURE — 86580 TB INTRADERMAL TEST: CPT | Performed by: REGISTERED NURSE

## 2020-08-12 NOTE — PROGRESS NOTES
The patient is asked the following questions today and these are her answers:    -Have you had a mantoux administered in the past 30 days?    Yes  -Have you had a previous positive Mantoux.  No  -Have you received BCG in the past.  No  -Have you had a live vaccine  (MMR, Varicella, OPV, Yellow Fever) in the last 6 weeks.  No  -Have you had and active  viral or bacterial infection in the past 6 weeks.  No  -Have you received corticosteroids or immunosuppressive agents in the past 6 weeks.  No  -Have you been diagnosed with HIV?  No  -Do you have a malignancy?  No    Mantoux Questionnaire: was positive for at least one answer. Patient had first step mantoux on 7/29/20, with negative result.  Today is receiving step two.     Genesis Dyson RN, BSN on 8/12/2020 at 9:19 AM

## 2020-08-14 ENCOUNTER — ALLIED HEALTH/NURSE VISIT (OUTPATIENT)
Dept: FAMILY MEDICINE | Facility: OTHER | Age: 30
End: 2020-08-14
Payer: COMMERCIAL

## 2020-08-14 DIAGNOSIS — Z11.1 SCREENING EXAMINATION FOR PULMONARY TUBERCULOSIS: Primary | ICD-10-CM

## 2020-08-14 LAB
PPDINDURATION: 0 MM (ref 0–5)
PPDREDNESS: 0 MM

## 2020-08-14 PROCEDURE — 99207 ZZC NO CHARGE NURSE ONLY: CPT

## 2020-08-14 NOTE — PROGRESS NOTES
Mantoux results: No induration.  No swelling.  No redness.    Documentation completed and given to patient.       Genesis Dyson RN, BSN on 8/14/2020 at 10:32 AM

## 2020-10-23 ENCOUNTER — ALLIED HEALTH/NURSE VISIT (OUTPATIENT)
Dept: FAMILY MEDICINE | Facility: OTHER | Age: 30
End: 2020-10-23
Attending: PEDIATRICS
Payer: COMMERCIAL

## 2020-10-23 DIAGNOSIS — Z23 NEED FOR PROPHYLACTIC VACCINATION AND INOCULATION AGAINST INFLUENZA: Primary | ICD-10-CM

## 2020-10-23 PROCEDURE — 90471 IMMUNIZATION ADMIN: CPT

## 2020-10-23 PROCEDURE — 90686 IIV4 VACC NO PRSV 0.5 ML IM: CPT

## 2021-01-03 ENCOUNTER — HEALTH MAINTENANCE LETTER (OUTPATIENT)
Age: 31
End: 2021-01-03

## 2021-10-10 ENCOUNTER — HEALTH MAINTENANCE LETTER (OUTPATIENT)
Age: 31
End: 2021-10-10

## 2021-11-09 ENCOUNTER — OFFICE VISIT (OUTPATIENT)
Dept: OBGYN | Facility: OTHER | Age: 31
End: 2021-11-09
Attending: OBSTETRICS & GYNECOLOGY
Payer: COMMERCIAL

## 2021-11-09 VITALS
HEART RATE: 68 BPM | RESPIRATION RATE: 12 BRPM | SYSTOLIC BLOOD PRESSURE: 106 MMHG | BODY MASS INDEX: 22.78 KG/M2 | DIASTOLIC BLOOD PRESSURE: 84 MMHG | WEIGHT: 128.6 LBS

## 2021-11-09 DIAGNOSIS — Z23 NEEDS FLU SHOT: ICD-10-CM

## 2021-11-09 DIAGNOSIS — Z31.69 ENCOUNTER FOR PRECONCEPTION CONSULTATION: ICD-10-CM

## 2021-11-09 DIAGNOSIS — R11.2 NAUSEA AND VOMITING, INTRACTABILITY OF VOMITING NOT SPECIFIED, UNSPECIFIED VOMITING TYPE: Primary | ICD-10-CM

## 2021-11-09 PROCEDURE — 90686 IIV4 VACC NO PRSV 0.5 ML IM: CPT | Performed by: OBSTETRICS & GYNECOLOGY

## 2021-11-09 PROCEDURE — 99213 OFFICE O/P EST LOW 20 MIN: CPT | Mod: 25 | Performed by: OBSTETRICS & GYNECOLOGY

## 2021-11-09 PROCEDURE — 90471 IMMUNIZATION ADMIN: CPT | Performed by: OBSTETRICS & GYNECOLOGY

## 2021-11-09 RX ORDER — PRENATAL VIT/IRON FUM/FOLIC AC 27MG-0.8MG
1 TABLET ORAL DAILY
Qty: 90 TABLET | Refills: 3 | Status: SHIPPED | OUTPATIENT
Start: 2021-11-09 | End: 2024-02-02

## 2021-11-09 RX ORDER — ONDANSETRON 4 MG/1
4 TABLET, ORALLY DISINTEGRATING ORAL EVERY 8 HOURS PRN
Qty: 20 TABLET | Refills: 0 | Status: ON HOLD | OUTPATIENT
Start: 2021-11-09 | End: 2022-10-28

## 2021-11-09 ASSESSMENT — PAIN SCALES - GENERAL: PAINLEVEL: NO PAIN (0)

## 2021-11-09 NOTE — PROGRESS NOTES
CC: requests IUD removal  HPI:  Debra is a 30 year old female who presents for IUD removal so she may conceive. She is also wanting a flu shot, and requesting some Zofran in the event she conceives soon as she struggled with hyperemesis with her first baby. Her child is 2.  She is moving to [place] for a few years later this month.  No LMP recorded (lmp unknown). (Menstrual status: IUD).      OB History    Para Term  AB Living   1 1 1 0 0 1   SAB IAB Ectopic Multiple Live Births   0 0 0 0 1      # Outcome Date GA Lbr Cesar/2nd Weight Sex Delivery Anes PTL Lv   1 Term 19 39w3d 13:00 / 01:18 3.243 kg (7 lb 2.4 oz) F Vag-Spont INT N EMILIANO      Name: ,FEMALE-DEBRA      Apgar1: 9  Apgar5: 9     Past Medical History:   Diagnosis Date     Anemia due to blood loss, acute 3/30/2019     Past Surgical History:   Procedure Laterality Date     BUNIONECTOMY       SINUS SURGERY       Social History     Socioeconomic History     Marital status:      Spouse name: Not on file     Number of children: 1     Years of education: Not on file     Highest education level: Not on file   Occupational History     Occupation: lactation consultant     Employer: [employer]   Tobacco Use     Smoking status: Never Smoker     Smokeless tobacco: Never Used   Substance and Sexual Activity     Alcohol use: No     Drug use: No     Sexual activity: Yes     Partners: Male     Birth control/protection: None, I.U.D.   Other Topics Concern     Not on file   Social History Narrative     Not on file     Social Determinants of Health     Financial Resource Strain: Not on file   Food Insecurity: Not on file   Transportation Needs: Not on file   Physical Activity: Not on file   Stress: Not on file   Social Connections: Not on file   Intimate Partner Violence: Not on file   Housing Stability: Not on file     Family History   Problem Relation Age of Onset     Colitis Mother         colitis     Breast Cancer Maternal Grandmother          late 50s     No Known Problems Daughter         b. 2019       Current Outpatient Medications   Medication     ondansetron (ZOFRAN-ODT) 4 MG ODT tab     Prenatal Vit-Fe Fumarate-FA (PRENATAL MULTIVITAMIN W/IRON) 27-0.8 MG tablet     ibuprofen (ADVIL/MOTRIN) 600 MG tablet     Misc. Devices (BREAST PUMP) MISC     Prenatal Vit-Fe Fumarate-FA (GNP PRENATAL VITAMINS) 28-0.8 MG TABS     No current facility-administered medications for this visit.     Allergies   Allergen Reactions     Quasense Itching     /84 (BP Location: Right arm, Patient Position: Sitting, Cuff Size: Adult Regular)   Pulse 68   Resp 12   Wt 58.3 kg (128 lb 9.6 oz)   LMP  (LMP Unknown)   Breastfeeding No   BMI 22.78 kg/m      REVIEW OF SYSTEMS  Neg except as above    Exam:  Constitutional: healthy, alert, active and no distress  Consent was obtained.    Pelvic: Normal BUSE, vulva appears normal, vagina and cervix are normal.  IUD strings visualized, grasped with Boseman forceps and removed easily.  Chaperone was present.        Lab: No results found for any visits on 11/09/21.    ASSESSMENT/PLAN :  1. Nausea and vomiting, intractability of vomiting not specified, unspecified vomiting type    2. Needs flu shot    3. Encounter for preconception consultation      Recommended she start a PNV ASAP, Rx sent as requested for Zofran and PNV.      Nick Grande MD FACOG  10:53 AM 11/9/2021

## 2021-11-09 NOTE — NURSING NOTE
"Chief Complaint   Patient presents with     Procedure     IUD removal     Patient stated she would like the IUD removed due to wanting to try for baby number 2.    Initial /84 (BP Location: Right arm, Patient Position: Sitting, Cuff Size: Adult Regular)   Pulse 68   Resp 12   Wt 58.3 kg (128 lb 9.6 oz)   LMP  (LMP Unknown)   Breastfeeding No   BMI 22.78 kg/m   Estimated body mass index is 22.78 kg/m  as calculated from the following:    Height as of 3/28/19: 1.6 m (5' 3\").    Weight as of this encounter: 58.3 kg (128 lb 9.6 oz).  Medication Reconciliation: Completed     Advanced Care Directive Reviewed    Cj Mccallum LPN  "

## 2022-01-29 ENCOUNTER — HEALTH MAINTENANCE LETTER (OUTPATIENT)
Age: 32
End: 2022-01-29

## 2022-03-02 ENCOUNTER — TELEPHONE (OUTPATIENT)
Dept: OBGYN | Facility: OTHER | Age: 32
End: 2022-03-02

## 2022-03-02 NOTE — TELEPHONE ENCOUNTER
Returned patients call and patient stated that she had a positive home pregnancy test and now is very nauseated. She is currently living in [place].  Advised patient to establish care where she is at and visit the nearest ER if need be to be evaluated for need for hydration and nausea medications. Patient voiced understanding and has no further questions or concerns.  Stacie Langston RN on 3/2/2022 at 3:29 PM

## 2022-03-02 NOTE — TELEPHONE ENCOUNTER
Patient is having a lot of nausea and would like something for it.  She now lives in [place] and has not seen another provider yet. Please call      Krystin Tee on 3/2/2022 at 3:04 PM

## 2022-03-18 ENCOUNTER — TRANSFERRED RECORDS (OUTPATIENT)
Dept: HEALTH INFORMATION MANAGEMENT | Facility: OTHER | Age: 32
End: 2022-03-18

## 2022-03-18 LAB
C TRACH DNA SPEC QL PROBE+SIG AMP: NOT DETECTED
HPV ABSTRACT: NORMAL
N GONORRHOEA DNA SPEC QL PROBE+SIG AMP: NOT DETECTED
PAP-ABSTRACT: NORMAL
SPECIMEN DESCRIP: NORMAL
SPECIMEN DESCRIPTION: NORMAL

## 2022-04-22 ENCOUNTER — TRANSFERRED RECORDS (OUTPATIENT)
Dept: HEALTH INFORMATION MANAGEMENT | Facility: OTHER | Age: 32
End: 2022-04-22

## 2022-04-22 LAB — HIV 1&2 EXT: NORMAL

## 2022-05-17 ENCOUNTER — TRANSFERRED RECORDS (OUTPATIENT)
Dept: HEALTH INFORMATION MANAGEMENT | Facility: OTHER | Age: 32
End: 2022-05-17

## 2022-06-13 ENCOUNTER — TRANSFERRED RECORDS (OUTPATIENT)
Dept: HEALTH INFORMATION MANAGEMENT | Facility: OTHER | Age: 32
End: 2022-06-13

## 2022-08-11 ENCOUNTER — TRANSFERRED RECORDS (OUTPATIENT)
Dept: HEALTH INFORMATION MANAGEMENT | Facility: OTHER | Age: 32
End: 2022-08-11

## 2022-08-11 LAB — HIV 1&2 EXT: NORMAL

## 2022-09-15 ENCOUNTER — VIRTUAL VISIT (OUTPATIENT)
Dept: OBGYN | Facility: OTHER | Age: 32
End: 2022-09-15
Attending: OBSTETRICS & GYNECOLOGY

## 2022-09-15 VITALS — BODY MASS INDEX: 21.62 KG/M2 | WEIGHT: 122 LBS | HEIGHT: 63 IN

## 2022-09-15 DIAGNOSIS — Z34.90 SUPERVISION OF NORMAL PREGNANCY: Primary | ICD-10-CM

## 2022-09-15 PROCEDURE — 99207 PR OB VISIT-NO CHARGE - GICH ONLY: CPT

## 2022-09-15 RX ORDER — OMEPRAZOLE 10 MG/1
20 CAPSULE, DELAYED RELEASE ORAL DAILY
Status: ON HOLD | COMMUNITY
End: 2022-10-28

## 2022-09-15 ASSESSMENT — PATIENT HEALTH QUESTIONNAIRE - PHQ9: SUM OF ALL RESPONSES TO PHQ QUESTIONS 1-9: 0

## 2022-09-15 NOTE — PROGRESS NOTES
Patient did not answer, left voicemail to call back.   Vanda Freitas RN on 9/15/2022 at 10:02 AM

## 2022-09-15 NOTE — PROGRESS NOTES
Verbal consent obtained for telephone visit. Total length of call: 21 min    HPI:    This is a 31 year old female patient,  who was called today for OB Intake visit. Patient reports positive pregnancy test at home.     Obstetrical history and OB Demographics updated to the best of this nurse's ability based on patient report. PHQ-9 depression screening and routine Domestic Abuse screening completed. All immediate questions and concerns answered.    FOOD SECURITY SCREENING QUESTIONS:    The next two questions are to help us understand your food security.  If you are feeling you need any assistance in this area, we have resources available to support you today.    Hunger Vital Signs:  Within the past 12 months we worried whether our food would run out before we got money to buy more. Never  Within the past 12 months the food we bought just didn't last and we didn't have money to get more. Never    Last menstrual period is reported as Patient's last menstrual period was 01/15/2022. ABRBARA based on LMP is Estimated Date of Delivery: Oct 22, 2022.  Her cycles are irregular.  Her last menstrual period was abnormal.   Since her LMP, she has experienced  nausea, emesis and hemorrhoids.       OBSTETRIC HISTORY:    OB History    Para Term  AB Living   2 1 1 0 0 1   SAB IAB Ectopic Multiple Live Births   0 0 0 0 1      # Outcome Date GA Lbr Cesar/2nd Weight Sex Delivery Anes PTL Lv   2 Current            1 Term 19 39w3d 13:00 / 01:18 3.243 kg (7 lb 2.4 oz) F Vag-Spont INT N EMILIANO      Name: ,FEMALE-DEBRA      Apgar1: 9  Apgar5: 9       Age of first pregnancy: 29  Previous OB Provider: ROXANN  Previous Delivering Clinic: [facility]  Release of Records: From current pregnancy now in [place]    Current delivery plan: [facility]  Preferred OB Provider: ROXANN  Current Primary Care Provider: None  Pediatrician: TALHA    Additional History: Patient currently 34w5d getting prenatal care in [place], will be moving back  to [place] by 10/1/22. Appointments have been set up for future appointments.      Have you travelled during the pregnancy?No  Have your sexual partner(s) travelled during the pregnancy?No      HISTORY:   Planned Pregnancy: Yes  Marital Status:   Occupation: Registered Nurse  Living in Household: Spouse and Children    Father of the baby is involved.   Family and father of baby is supportive of current pregnancy.  Past Medical History of Father of Baby:No significant medical history    Past History:  Her past medical history   Past Medical History:   Diagnosis Date     Anemia due to blood loss, acute 3/30/2019   .      Her past surgical history:   Past Surgical History:   Procedure Laterality Date     BUNIONECTOMY       SINUS SURGERY         She has a history of  hyperemesis gravidarum    Since her last LMP she denies use of alcohol, tobacco and street drugs.    Pap smear history: NO - age 30- 65 PAP every 3 years recommended    STD/STI history: No STD history    STD/STI symptoms: no noticeable symptoms     Past medical, surgical, social and family history were reviewed and updated in EP[facility].    Medications reviewed by this nurse. Current medication list:  Current Outpatient Medications   Medication Sig Dispense Refill     omeprazole (PRILOSEC) 10 MG DR capsule Take 20 mg by mouth daily       ondansetron (ZOFRAN-ODT) 4 MG ODT tab Take 1 tablet (4 mg) by mouth every 8 hours as needed for nausea 20 tablet 0     Prenatal Vit-Fe Fumarate-FA (PRENATAL MULTIVITAMIN W/IRON) 27-0.8 MG tablet Take 1 tablet by mouth daily 90 tablet 3     ibuprofen (ADVIL/MOTRIN) 600 MG tablet Take 1 tablet (600 mg) by mouth every 6 hours as needed for other (cramping) (Patient not taking: Reported on 9/15/2022) 20 tablet 0     Misc. Devices (BREAST PUMP) MISC Dispense 1 double electric breast pump for expressing breast milk for human infant 1 each 0     Prenatal Vit-Fe Fumarate-FA (GNP PRENATAL VITAMINS) 28-0.8 MG TABS Take 1 tablet  by mouth daily (Patient not taking: Reported on 9/15/2022)       The following medications were recommended to be discontinued due to Pregnancy Category D status: Ibuprofen  Patient informed to contact her primary care provider as soon as possible to discuss a safer alternative.    Risk factors:  Moderate and moderately severe risks (consult with OB/Gyn)  Previous fetal or  demise: No  History of  delivery: No  History of heart disease Class I: No  Severe anemia, unresponsive to iron therapy: No  Pelvic mass or neoplasm: No  Previous : No  Hyper/hypothyroidism: No  History of postpartum hemorrhage requiring transfusion:No  History of Placenta Accreta: No    High Risk (Pregnancy managed by OB/Gyn)  Multiple pregnancy: No  Pre-gestational diabetes: No  Chronic Hypertension: No  Renal Failure: No  Heart disease, class II or greater: No  Rh Isoimmunization: No  Chronic active hepatitis: No  Convulsive disorder, poorly controlled: No  Isoimmune thrombocytopenia: No  Pre-term premature rupture of membranes: No  Lupus or other autoimmune disorder: No  Human Immunodeficiency Virus: No      ASSESSMENT/PLAN:       ICD-10-CM    1. Supervision of normal pregnancy  Z34.90        31 year old , 34w5d of pregnancy with BARBARA of 10/22/2022, by Last Menstrual Period    Per standing orders and scope of practice of this nurse, patient will have the following orders placed and completed prior to initial OB visit with the appropriate provider:    --early ultrasound for dating and viability ordered for 6+ weeks gestation based on LMP    --Quantitative Beta HCG and progesterone monitoring if indicated    Counseling given:     - Recommended weight gain for pregnancy: 25-35 lbs.   BMI < 18.5  28-40 lbs   18.5 - 24.9 25-35   25 - 29.9 15-25   > 30  < 15       PLAN/PATIENT INSTRUCTIONS:    Normal exercise.  Normal sexual activity.  Prenatal vitamins.  Anticipated weight gain.    follow-up appointment with Dr. HACKETT  for pre-stephanie care and take multivitamin or pre- vitamins    Vanda Freitas RN.................................................. 9/15/2022 10:16 AM

## 2022-09-18 ENCOUNTER — HEALTH MAINTENANCE LETTER (OUTPATIENT)
Age: 32
End: 2022-09-18

## 2022-09-21 ENCOUNTER — TRANSFERRED RECORDS (OUTPATIENT)
Dept: HEALTH INFORMATION MANAGEMENT | Facility: OTHER | Age: 32
End: 2022-09-21

## 2022-10-12 ENCOUNTER — PRENATAL OFFICE VISIT (OUTPATIENT)
Dept: OBGYN | Facility: OTHER | Age: 32
End: 2022-10-12
Attending: OBSTETRICS & GYNECOLOGY
Payer: COMMERCIAL

## 2022-10-12 VITALS
SYSTOLIC BLOOD PRESSURE: 108 MMHG | HEART RATE: 94 BPM | DIASTOLIC BLOOD PRESSURE: 80 MMHG | RESPIRATION RATE: 16 BRPM | WEIGHT: 158.4 LBS | BODY MASS INDEX: 28.07 KG/M2 | HEIGHT: 63 IN | OXYGEN SATURATION: 99 %

## 2022-10-12 DIAGNOSIS — Z34.83 ENCOUNTER FOR SUPERVISION OF OTHER NORMAL PREGNANCY IN THIRD TRIMESTER: ICD-10-CM

## 2022-10-12 DIAGNOSIS — Z23 NEED FOR TETANUS BOOSTER: Primary | ICD-10-CM

## 2022-10-12 PROCEDURE — 90715 TDAP VACCINE 7 YRS/> IM: CPT | Performed by: OBSTETRICS & GYNECOLOGY

## 2022-10-12 PROCEDURE — 99214 OFFICE O/P EST MOD 30 MIN: CPT | Mod: 25 | Performed by: OBSTETRICS & GYNECOLOGY

## 2022-10-12 PROCEDURE — 90471 IMMUNIZATION ADMIN: CPT | Performed by: OBSTETRICS & GYNECOLOGY

## 2022-10-12 ASSESSMENT — PAIN SCALES - GENERAL: PAINLEVEL: NO PAIN (0)

## 2022-10-12 NOTE — NURSING NOTE
"Patient is here for routine prenatal visit. Is transferring care from [place]. She has no concerns today.    Chief Complaint   Patient presents with     Prenatal Care     38w4d       Initial /80 (BP Location: Right arm, Patient Position: Sitting, Cuff Size: Adult Regular)   Pulse 94   Resp 16   Ht 1.6 m (5' 3\")   Wt 71.8 kg (158 lb 6.4 oz)   LMP 01/15/2022   SpO2 99%   BMI 28.06 kg/m   Estimated body mass index is 28.06 kg/m  as calculated from the following:    Height as of this encounter: 1.6 m (5' 3\").    Weight as of this encounter: 71.8 kg (158 lb 6.4 oz).  Medication Reconciliation: complete     Sahil Sanabria RN    "

## 2022-10-12 NOTE — PROGRESS NOTES
"Transfer OB Visit    S: Patient presents as a VICKY from [place] for ongoing prenatal care. She moved back to  a few weeks ago and is currently staying with her in-laws while looking for housing.  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 Notes a lot more pelvic pain with this pregnancy compared to her first. Having BH ctx but no VB or LOF. +FM. Is a [employer] and also works from home for [employer].     O: /80 (BP Location: Right arm, Patient Position: Sitting, Cuff Size: Adult Regular)   Pulse 94   Resp 16   Ht 1.6 m (5' 3\")   Wt 71.8 kg (158 lb 6.4 oz)   LMP 01/15/2022   SpO2 99%   BMI 28.06 kg/m    Gen: Well-appearing, NAD  See OB Flowsheet    A/P:  Gerda Hill is a 31 year old  at 38w4d by 8w0d US , here for return OB visit.  Plans breastfeeding, Mirena    PNC: - Prenatal labs reviewed from OSH (scanned), Rh positive, Rubella immune, , reports her provider said she didn't need to do the GTT, GBS negative  Genetics: normal quad screen  Imaging: dating US at 8w0d, normal anatomy  Immunizations: declines flu, s/p COVID series, Tdap 10/12/2022   RTC weekly    Lizeth Langston MD FACOG  OB/GYN  10/12/2022 1:32 PM     "

## 2022-10-19 ENCOUNTER — PRENATAL OFFICE VISIT (OUTPATIENT)
Dept: OBGYN | Facility: OTHER | Age: 32
End: 2022-10-19
Attending: OBSTETRICS & GYNECOLOGY
Payer: COMMERCIAL

## 2022-10-19 VITALS
WEIGHT: 159 LBS | BODY MASS INDEX: 28.17 KG/M2 | SYSTOLIC BLOOD PRESSURE: 122 MMHG | DIASTOLIC BLOOD PRESSURE: 80 MMHG | HEART RATE: 88 BPM

## 2022-10-19 DIAGNOSIS — Z34.83 ENCOUNTER FOR SUPERVISION OF OTHER NORMAL PREGNANCY IN THIRD TRIMESTER: Primary | ICD-10-CM

## 2022-10-19 PROCEDURE — 99213 OFFICE O/P EST LOW 20 MIN: CPT | Performed by: OBSTETRICS & GYNECOLOGY

## 2022-10-19 ASSESSMENT — PAIN SCALES - GENERAL: PAINLEVEL: NO PAIN (0)

## 2022-10-19 NOTE — PROGRESS NOTES
Return OB Visit    S: Patient is uncomfortable, ready to be done. BH ctx, nothing regular. No VB or LOF. +FM    O: /80 (BP Location: Right arm, Patient Position: Sitting, Cuff Size: Adult Large)   Pulse 88   Wt 72.1 kg (159 lb)   LMP 01/15/2022   BMI 28.17 kg/m    Gen: Well-appearing, NAD  See OB Flowsheet    A/P:  Gerda Hill is a 31 year old  at 39w4d by 8w0d US , here for return OB visit.  Plans breastfeeding, Mirena     PNC: - Prenatal labs reviewed from OSH (scanned), Rh positive, Rubella immune, , reports her provider said she didn't need to do the GTT, GBS negative  Genetics: normal quad screen  Imaging: dating US at 8w0d, normal anatomy  Immunizations: declines flu, s/p COVID series, Tdap 10/12/2022   RTC weekly    Lizeth Langston MD FACOG  OB/GYN  10/19/2022 12:58 PM

## 2022-10-19 NOTE — NURSING NOTE
Chief Complaint   Patient presents with     Prenatal Care     39w4d       Medication Reconciliation: complete   Baby is nice active.  Offers no complaints      Nicole Mason LPN........................10/19/2022  12:48 PM

## 2022-10-26 ENCOUNTER — PRENATAL OFFICE VISIT (OUTPATIENT)
Dept: OBGYN | Facility: OTHER | Age: 32
End: 2022-10-26
Attending: OBSTETRICS & GYNECOLOGY
Payer: COMMERCIAL

## 2022-10-26 VITALS
DIASTOLIC BLOOD PRESSURE: 60 MMHG | BODY MASS INDEX: 28.34 KG/M2 | WEIGHT: 160 LBS | HEART RATE: 72 BPM | SYSTOLIC BLOOD PRESSURE: 106 MMHG

## 2022-10-26 DIAGNOSIS — Z34.83 ENCOUNTER FOR SUPERVISION OF OTHER NORMAL PREGNANCY IN THIRD TRIMESTER: Primary | ICD-10-CM

## 2022-10-26 PROCEDURE — 99213 OFFICE O/P EST LOW 20 MIN: CPT | Performed by: OBSTETRICS & GYNECOLOGY

## 2022-10-26 RX ORDER — OXYTOCIN/0.9 % SODIUM CHLORIDE 30/500 ML
340 PLASTIC BAG, INJECTION (ML) INTRAVENOUS CONTINUOUS PRN
Status: CANCELLED | OUTPATIENT
Start: 2022-10-26

## 2022-10-26 RX ORDER — METHYLERGONOVINE MALEATE 0.2 MG/ML
200 INJECTION INTRAVENOUS
Status: CANCELLED | OUTPATIENT
Start: 2022-10-26

## 2022-10-26 RX ORDER — OXYTOCIN 10 [USP'U]/ML
10 INJECTION, SOLUTION INTRAMUSCULAR; INTRAVENOUS
Status: CANCELLED | OUTPATIENT
Start: 2022-10-26

## 2022-10-26 RX ORDER — KETOROLAC TROMETHAMINE 30 MG/ML
30 INJECTION, SOLUTION INTRAMUSCULAR; INTRAVENOUS
Status: CANCELLED | OUTPATIENT
Start: 2022-10-26 | End: 2022-10-31

## 2022-10-26 RX ORDER — NALOXONE HYDROCHLORIDE 0.4 MG/ML
0.4 INJECTION, SOLUTION INTRAMUSCULAR; INTRAVENOUS; SUBCUTANEOUS
Status: CANCELLED | OUTPATIENT
Start: 2022-10-26

## 2022-10-26 RX ORDER — OXYTOCIN/0.9 % SODIUM CHLORIDE 30/500 ML
1-24 PLASTIC BAG, INJECTION (ML) INTRAVENOUS CONTINUOUS
Status: CANCELLED | OUTPATIENT
Start: 2022-10-26

## 2022-10-26 RX ORDER — SODIUM CHLORIDE, SODIUM LACTATE, POTASSIUM CHLORIDE, CALCIUM CHLORIDE 600; 310; 30; 20 MG/100ML; MG/100ML; MG/100ML; MG/100ML
INJECTION, SOLUTION INTRAVENOUS CONTINUOUS
Status: CANCELLED | OUTPATIENT
Start: 2022-10-26

## 2022-10-26 RX ORDER — ONDANSETRON 4 MG/1
4 TABLET, ORALLY DISINTEGRATING ORAL EVERY 6 HOURS PRN
Status: CANCELLED | OUTPATIENT
Start: 2022-10-26

## 2022-10-26 RX ORDER — ONDANSETRON 2 MG/ML
4 INJECTION INTRAMUSCULAR; INTRAVENOUS EVERY 6 HOURS PRN
Status: CANCELLED | OUTPATIENT
Start: 2022-10-26

## 2022-10-26 RX ORDER — CITRIC ACID/SODIUM CITRATE 334-500MG
30 SOLUTION, ORAL ORAL
Status: CANCELLED | OUTPATIENT
Start: 2022-10-26

## 2022-10-26 RX ORDER — OXYTOCIN/0.9 % SODIUM CHLORIDE 30/500 ML
100-340 PLASTIC BAG, INJECTION (ML) INTRAVENOUS CONTINUOUS PRN
Status: CANCELLED | OUTPATIENT
Start: 2022-10-26

## 2022-10-26 RX ORDER — CARBOPROST TROMETHAMINE 250 UG/ML
250 INJECTION, SOLUTION INTRAMUSCULAR
Status: CANCELLED | OUTPATIENT
Start: 2022-10-26

## 2022-10-26 RX ORDER — PROCHLORPERAZINE 25 MG
25 SUPPOSITORY, RECTAL RECTAL EVERY 12 HOURS PRN
Status: CANCELLED | OUTPATIENT
Start: 2022-10-26

## 2022-10-26 RX ORDER — NALOXONE HYDROCHLORIDE 0.4 MG/ML
0.2 INJECTION, SOLUTION INTRAMUSCULAR; INTRAVENOUS; SUBCUTANEOUS
Status: CANCELLED | OUTPATIENT
Start: 2022-10-26

## 2022-10-26 RX ORDER — METOCLOPRAMIDE 10 MG/1
10 TABLET ORAL EVERY 6 HOURS PRN
Status: CANCELLED | OUTPATIENT
Start: 2022-10-26

## 2022-10-26 RX ORDER — IBUPROFEN 200 MG
800 TABLET ORAL
Status: CANCELLED | OUTPATIENT
Start: 2022-10-26 | End: 2022-10-31

## 2022-10-26 RX ORDER — PROCHLORPERAZINE MALEATE 10 MG
10 TABLET ORAL EVERY 6 HOURS PRN
Status: CANCELLED | OUTPATIENT
Start: 2022-10-26

## 2022-10-26 RX ORDER — LIDOCAINE 40 MG/G
CREAM TOPICAL
Status: CANCELLED | OUTPATIENT
Start: 2022-10-26

## 2022-10-26 RX ORDER — TRANEXAMIC ACID 10 MG/ML
1 INJECTION, SOLUTION INTRAVENOUS EVERY 30 MIN PRN
Status: CANCELLED | OUTPATIENT
Start: 2022-10-26

## 2022-10-26 RX ORDER — METOCLOPRAMIDE HYDROCHLORIDE 5 MG/ML
10 INJECTION INTRAMUSCULAR; INTRAVENOUS EVERY 6 HOURS PRN
Status: CANCELLED | OUTPATIENT
Start: 2022-10-26

## 2022-10-26 RX ORDER — SODIUM CHLORIDE, SODIUM LACTATE, POTASSIUM CHLORIDE, CALCIUM CHLORIDE 600; 310; 30; 20 MG/100ML; MG/100ML; MG/100ML; MG/100ML
INJECTION, SOLUTION INTRAVENOUS CONTINUOUS PRN
Status: CANCELLED | OUTPATIENT
Start: 2022-10-26

## 2022-10-26 RX ORDER — MISOPROSTOL 100 UG/1
400 TABLET ORAL
Status: CANCELLED | OUTPATIENT
Start: 2022-10-26

## 2022-10-26 RX ORDER — TERBUTALINE SULFATE 1 MG/ML
0.25 INJECTION, SOLUTION SUBCUTANEOUS
Status: CANCELLED | OUTPATIENT
Start: 2022-10-26

## 2022-10-26 ASSESSMENT — PAIN SCALES - GENERAL: PAINLEVEL: NO PAIN (0)

## 2022-10-26 NOTE — NURSING NOTE
Chief Complaint   Patient presents with     Prenatal Care     40w4d       Medication Reconciliation: complete   Offers no complaints; baby is nice and active.     Nicole Mason LPN........................10/26/2022  1:42 PM

## 2022-10-26 NOTE — PROGRESS NOTES
Return OB Visit    S: Patient is getting more uncomfortable. Having more ctx but not regular. No VB or LOF. +FM    O: /60 (BP Location: Right arm, Patient Position: Sitting, Cuff Size: Adult Regular)   Pulse 72   Wt 72.6 kg (160 lb)   LMP 01/15/2022   BMI 28.34 kg/m    Gen: Well-appearing, NAD  See OB Flowsheet    A/P:  Gerda Hill is a 31 year old  at 39w5d by 8w0d US , here for return OB visit.  Plans breastfeeding, Mirena  Dating: reviewed her dating- was outside the margin of error at that gestational age, will redate based on US results at 8w0d with new BARBARA of 10/28     PNC: - Prenatal labs reviewed from OSH (scanned), Rh positive, Rubella immune, , reports her provider said she didn't need to do the GTT, GBS negative  Genetics: normal quad screen  Imaging: dating US at 8w0d, normal anatomy  Immunizations: declines flu, s/p COVID series, Tdap 10/12/2022   RTC weekly- IOL scheduled for  at 41w0d    Lizeth Langston MD FACOG  OB/GYN  10/26/2022 1:50 PM

## 2022-10-27 ENCOUNTER — ANESTHESIA EVENT (OUTPATIENT)
Dept: OBGYN | Facility: OTHER | Age: 32
End: 2022-10-27
Payer: COMMERCIAL

## 2022-10-27 ENCOUNTER — HOSPITAL ENCOUNTER (INPATIENT)
Facility: OTHER | Age: 32
LOS: 1 days | Discharge: HOME | End: 2022-10-28
Attending: OBSTETRICS & GYNECOLOGY | Admitting: OBSTETRICS & GYNECOLOGY
Payer: COMMERCIAL

## 2022-10-27 ENCOUNTER — ANESTHESIA (OUTPATIENT)
Dept: OBGYN | Facility: OTHER | Age: 32
End: 2022-10-27
Payer: COMMERCIAL

## 2022-10-27 DIAGNOSIS — Z34.83 ENCOUNTER FOR SUPERVISION OF OTHER NORMAL PREGNANCY IN THIRD TRIMESTER: ICD-10-CM

## 2022-10-27 LAB
ABO/RH(D): NORMAL
ANTIBODY SCREEN: NEGATIVE
ERYTHROCYTE [DISTWIDTH] IN BLOOD BY AUTOMATED COUNT: 12.8 % (ref 10–15)
FLUAV RNA SPEC QL NAA+PROBE: NEGATIVE
FLUBV RNA RESP QL NAA+PROBE: NEGATIVE
HCT VFR BLD AUTO: 38.3 % (ref 35–47)
HGB BLD-MCNC: 13.1 G/DL (ref 11.7–15.7)
MCH RBC QN AUTO: 29.8 PG (ref 26.5–33)
MCHC RBC AUTO-ENTMCNC: 34.2 G/DL (ref 31.5–36.5)
MCV RBC AUTO: 87 FL (ref 78–100)
PLATELET # BLD AUTO: 202 10E3/UL (ref 150–450)
RBC # BLD AUTO: 4.39 10E6/UL (ref 3.8–5.2)
RSV RNA SPEC NAA+PROBE: NEGATIVE
SARS-COV-2 RNA RESP QL NAA+PROBE: NEGATIVE
SPECIMEN EXPIRATION DATE: NORMAL
WBC # BLD AUTO: 13 10E3/UL (ref 4–11)

## 2022-10-27 PROCEDURE — 86850 RBC ANTIBODY SCREEN: CPT | Performed by: OBSTETRICS & GYNECOLOGY

## 2022-10-27 PROCEDURE — 10907ZC DRAINAGE OF AMNIOTIC FLUID, THERAPEUTIC FROM PRODUCTS OF CONCEPTION, VIA NATURAL OR ARTIFICIAL OPENING: ICD-10-PCS | Performed by: OBSTETRICS & GYNECOLOGY

## 2022-10-27 PROCEDURE — 722N000001 HC LABOR CARE VAGINAL DELIVERY SINGLE

## 2022-10-27 PROCEDURE — 86780 TREPONEMA PALLIDUM: CPT | Performed by: OBSTETRICS & GYNECOLOGY

## 2022-10-27 PROCEDURE — 250N000013 HC RX MED GY IP 250 OP 250 PS 637: Performed by: OBSTETRICS & GYNECOLOGY

## 2022-10-27 PROCEDURE — 59410 OBSTETRICAL CARE: CPT | Performed by: NURSE ANESTHETIST, CERTIFIED REGISTERED

## 2022-10-27 PROCEDURE — 10D17Z9 MANUAL EXTRACTION OF PRODUCTS OF CONCEPTION, RETAINED, VIA NATURAL OR ARTIFICIAL OPENING: ICD-10-PCS | Performed by: OBSTETRICS & GYNECOLOGY

## 2022-10-27 PROCEDURE — 250N000009 HC RX 250: Performed by: NURSE ANESTHETIST, CERTIFIED REGISTERED

## 2022-10-27 PROCEDURE — 59410 OBSTETRICAL CARE: CPT | Performed by: OBSTETRICS & GYNECOLOGY

## 2022-10-27 PROCEDURE — 250N000011 HC RX IP 250 OP 636: Performed by: OBSTETRICS & GYNECOLOGY

## 2022-10-27 PROCEDURE — 250N000009 HC RX 250: Performed by: OBSTETRICS & GYNECOLOGY

## 2022-10-27 PROCEDURE — 250N000011 HC RX IP 250 OP 636: Performed by: NURSE ANESTHETIST, CERTIFIED REGISTERED

## 2022-10-27 PROCEDURE — 87637 SARSCOV2&INF A&B&RSV AMP PRB: CPT | Performed by: OBSTETRICS & GYNECOLOGY

## 2022-10-27 PROCEDURE — 36415 COLL VENOUS BLD VENIPUNCTURE: CPT | Performed by: OBSTETRICS & GYNECOLOGY

## 2022-10-27 PROCEDURE — 86901 BLOOD TYPING SEROLOGIC RH(D): CPT | Performed by: OBSTETRICS & GYNECOLOGY

## 2022-10-27 PROCEDURE — 120N000001 HC R&B MED SURG/OB

## 2022-10-27 PROCEDURE — 258N000003 HC RX IP 258 OP 636: Performed by: OBSTETRICS & GYNECOLOGY

## 2022-10-27 PROCEDURE — 85027 COMPLETE CBC AUTOMATED: CPT | Performed by: OBSTETRICS & GYNECOLOGY

## 2022-10-27 PROCEDURE — 370N000003 HC ANESTHESIA WARD SERVICE

## 2022-10-27 RX ORDER — BISACODYL 10 MG
10 SUPPOSITORY, RECTAL RECTAL DAILY PRN
Status: DISCONTINUED | OUTPATIENT
Start: 2022-10-27 | End: 2022-10-28 | Stop reason: HOSPADM

## 2022-10-27 RX ORDER — CARBOPROST TROMETHAMINE 250 UG/ML
250 INJECTION, SOLUTION INTRAMUSCULAR
Status: DISCONTINUED | OUTPATIENT
Start: 2022-10-27 | End: 2022-10-28 | Stop reason: HOSPADM

## 2022-10-27 RX ORDER — ACETAMINOPHEN 325 MG/1
650 TABLET ORAL EVERY 4 HOURS PRN
Status: DISCONTINUED | OUTPATIENT
Start: 2022-10-27 | End: 2022-10-28 | Stop reason: HOSPADM

## 2022-10-27 RX ORDER — LIDOCAINE 40 MG/G
CREAM TOPICAL
Status: DISCONTINUED | OUTPATIENT
Start: 2022-10-27 | End: 2022-10-27 | Stop reason: HOSPADM

## 2022-10-27 RX ORDER — FENTANYL/BUPIVACAINE/NS/PF 2-1250MCG
PLASTIC BAG, INJECTION (ML) INJECTION CONTINUOUS PRN
Status: DISCONTINUED | OUTPATIENT
Start: 2022-10-27 | End: 2022-10-27

## 2022-10-27 RX ORDER — FENTANYL/BUPIVACAINE/NS/PF 2-1250MCG
PLASTIC BAG, INJECTION (ML) INJECTION CONTINUOUS
Status: DISCONTINUED | OUTPATIENT
Start: 2022-10-27 | End: 2022-10-27 | Stop reason: HOSPADM

## 2022-10-27 RX ORDER — TRANEXAMIC ACID 10 MG/ML
1 INJECTION, SOLUTION INTRAVENOUS EVERY 30 MIN PRN
Status: DISCONTINUED | OUTPATIENT
Start: 2022-10-27 | End: 2022-10-28 | Stop reason: HOSPADM

## 2022-10-27 RX ORDER — NALOXONE HYDROCHLORIDE 0.4 MG/ML
0.2 INJECTION, SOLUTION INTRAMUSCULAR; INTRAVENOUS; SUBCUTANEOUS
Status: DISCONTINUED | OUTPATIENT
Start: 2022-10-27 | End: 2022-10-27 | Stop reason: HOSPADM

## 2022-10-27 RX ORDER — ONDANSETRON 4 MG/1
4 TABLET, ORALLY DISINTEGRATING ORAL EVERY 6 HOURS PRN
Status: DISCONTINUED | OUTPATIENT
Start: 2022-10-27 | End: 2022-10-27 | Stop reason: HOSPADM

## 2022-10-27 RX ORDER — TERBUTALINE SULFATE 1 MG/ML
0.25 INJECTION, SOLUTION SUBCUTANEOUS
Status: DISCONTINUED | OUTPATIENT
Start: 2022-10-27 | End: 2022-10-27 | Stop reason: HOSPADM

## 2022-10-27 RX ORDER — OXYTOCIN/0.9 % SODIUM CHLORIDE 30/500 ML
340 PLASTIC BAG, INJECTION (ML) INTRAVENOUS CONTINUOUS PRN
Status: DISCONTINUED | OUTPATIENT
Start: 2022-10-27 | End: 2022-10-28 | Stop reason: HOSPADM

## 2022-10-27 RX ORDER — SODIUM CHLORIDE, SODIUM LACTATE, POTASSIUM CHLORIDE, CALCIUM CHLORIDE 600; 310; 30; 20 MG/100ML; MG/100ML; MG/100ML; MG/100ML
INJECTION, SOLUTION INTRAVENOUS CONTINUOUS PRN
Status: DISCONTINUED | OUTPATIENT
Start: 2022-10-27 | End: 2022-10-27 | Stop reason: HOSPADM

## 2022-10-27 RX ORDER — CARBOPROST TROMETHAMINE 250 UG/ML
250 INJECTION, SOLUTION INTRAMUSCULAR
Status: DISCONTINUED | OUTPATIENT
Start: 2022-10-27 | End: 2022-10-27 | Stop reason: HOSPADM

## 2022-10-27 RX ORDER — METHYLERGONOVINE MALEATE 0.2 MG/ML
200 INJECTION INTRAVENOUS
Status: DISCONTINUED | OUTPATIENT
Start: 2022-10-27 | End: 2022-10-28 | Stop reason: HOSPADM

## 2022-10-27 RX ORDER — OXYTOCIN 10 [USP'U]/ML
10 INJECTION, SOLUTION INTRAMUSCULAR; INTRAVENOUS
Status: DISCONTINUED | OUTPATIENT
Start: 2022-10-27 | End: 2022-10-28 | Stop reason: HOSPADM

## 2022-10-27 RX ORDER — SODIUM CHLORIDE, SODIUM LACTATE, POTASSIUM CHLORIDE, CALCIUM CHLORIDE 600; 310; 30; 20 MG/100ML; MG/100ML; MG/100ML; MG/100ML
INJECTION, SOLUTION INTRAVENOUS CONTINUOUS
Status: DISCONTINUED | OUTPATIENT
Start: 2022-10-27 | End: 2022-10-27 | Stop reason: HOSPADM

## 2022-10-27 RX ORDER — CITRIC ACID/SODIUM CITRATE 334-500MG
30 SOLUTION, ORAL ORAL
Status: DISCONTINUED | OUTPATIENT
Start: 2022-10-27 | End: 2022-10-27 | Stop reason: HOSPADM

## 2022-10-27 RX ORDER — OXYTOCIN/0.9 % SODIUM CHLORIDE 30/500 ML
1-24 PLASTIC BAG, INJECTION (ML) INTRAVENOUS CONTINUOUS
Status: DISCONTINUED | OUTPATIENT
Start: 2022-10-27 | End: 2022-10-27 | Stop reason: HOSPADM

## 2022-10-27 RX ORDER — METOCLOPRAMIDE 10 MG/1
10 TABLET ORAL EVERY 6 HOURS PRN
Status: DISCONTINUED | OUTPATIENT
Start: 2022-10-27 | End: 2022-10-27 | Stop reason: HOSPADM

## 2022-10-27 RX ORDER — LIDOCAINE HYDROCHLORIDE AND EPINEPHRINE 15; 5 MG/ML; UG/ML
INJECTION, SOLUTION EPIDURAL PRN
Status: DISCONTINUED | OUTPATIENT
Start: 2022-10-27 | End: 2022-10-27

## 2022-10-27 RX ORDER — DOCUSATE SODIUM 100 MG/1
100 CAPSULE, LIQUID FILLED ORAL DAILY
Status: DISCONTINUED | OUTPATIENT
Start: 2022-10-27 | End: 2022-10-28 | Stop reason: HOSPADM

## 2022-10-27 RX ORDER — NALOXONE HYDROCHLORIDE 0.4 MG/ML
0.4 INJECTION, SOLUTION INTRAMUSCULAR; INTRAVENOUS; SUBCUTANEOUS
Status: DISCONTINUED | OUTPATIENT
Start: 2022-10-27 | End: 2022-10-27 | Stop reason: HOSPADM

## 2022-10-27 RX ORDER — HYDROCORTISONE 25 MG/G
CREAM TOPICAL 3 TIMES DAILY PRN
Status: DISCONTINUED | OUTPATIENT
Start: 2022-10-27 | End: 2022-10-28 | Stop reason: HOSPADM

## 2022-10-27 RX ORDER — OXYTOCIN/0.9 % SODIUM CHLORIDE 30/500 ML
340 PLASTIC BAG, INJECTION (ML) INTRAVENOUS CONTINUOUS PRN
Status: COMPLETED | OUTPATIENT
Start: 2022-10-27 | End: 2022-10-27

## 2022-10-27 RX ORDER — FENTANYL CITRATE-0.9 % NACL/PF 10 MCG/ML
100 PLASTIC BAG, INJECTION (ML) INTRAVENOUS EVERY 5 MIN PRN
Status: DISCONTINUED | OUTPATIENT
Start: 2022-10-27 | End: 2022-10-27 | Stop reason: HOSPADM

## 2022-10-27 RX ORDER — NALBUPHINE HYDROCHLORIDE 10 MG/ML
2.5-5 INJECTION, SOLUTION INTRAMUSCULAR; INTRAVENOUS; SUBCUTANEOUS EVERY 6 HOURS PRN
Status: DISCONTINUED | OUTPATIENT
Start: 2022-10-27 | End: 2022-10-27

## 2022-10-27 RX ORDER — MISOPROSTOL 100 UG/1
400 TABLET ORAL
Status: DISCONTINUED | OUTPATIENT
Start: 2022-10-27 | End: 2022-10-28 | Stop reason: HOSPADM

## 2022-10-27 RX ORDER — TRANEXAMIC ACID 10 MG/ML
1 INJECTION, SOLUTION INTRAVENOUS EVERY 30 MIN PRN
Status: DISCONTINUED | OUTPATIENT
Start: 2022-10-27 | End: 2022-10-27 | Stop reason: HOSPADM

## 2022-10-27 RX ORDER — OXYTOCIN/0.9 % SODIUM CHLORIDE 30/500 ML
100-340 PLASTIC BAG, INJECTION (ML) INTRAVENOUS CONTINUOUS PRN
Status: DISCONTINUED | OUTPATIENT
Start: 2022-10-27 | End: 2022-10-28 | Stop reason: HOSPADM

## 2022-10-27 RX ORDER — OXYTOCIN 10 [USP'U]/ML
10 INJECTION, SOLUTION INTRAMUSCULAR; INTRAVENOUS
Status: DISCONTINUED | OUTPATIENT
Start: 2022-10-27 | End: 2022-10-27 | Stop reason: HOSPADM

## 2022-10-27 RX ORDER — KETOROLAC TROMETHAMINE 30 MG/ML
30 INJECTION, SOLUTION INTRAMUSCULAR; INTRAVENOUS
Status: DISCONTINUED | OUTPATIENT
Start: 2022-10-27 | End: 2022-10-28 | Stop reason: HOSPADM

## 2022-10-27 RX ORDER — ONDANSETRON 2 MG/ML
4 INJECTION INTRAMUSCULAR; INTRAVENOUS EVERY 6 HOURS PRN
Status: DISCONTINUED | OUTPATIENT
Start: 2022-10-27 | End: 2022-10-27 | Stop reason: HOSPADM

## 2022-10-27 RX ORDER — PROCHLORPERAZINE 25 MG
25 SUPPOSITORY, RECTAL RECTAL EVERY 12 HOURS PRN
Status: DISCONTINUED | OUTPATIENT
Start: 2022-10-27 | End: 2022-10-27 | Stop reason: HOSPADM

## 2022-10-27 RX ORDER — METOCLOPRAMIDE HYDROCHLORIDE 5 MG/ML
10 INJECTION INTRAMUSCULAR; INTRAVENOUS EVERY 6 HOURS PRN
Status: DISCONTINUED | OUTPATIENT
Start: 2022-10-27 | End: 2022-10-27 | Stop reason: HOSPADM

## 2022-10-27 RX ORDER — METHYLERGONOVINE MALEATE 0.2 MG/ML
200 INJECTION INTRAVENOUS
Status: DISCONTINUED | OUTPATIENT
Start: 2022-10-27 | End: 2022-10-27 | Stop reason: HOSPADM

## 2022-10-27 RX ORDER — IBUPROFEN 400 MG/1
800 TABLET, FILM COATED ORAL
Status: DISCONTINUED | OUTPATIENT
Start: 2022-10-27 | End: 2022-10-28 | Stop reason: HOSPADM

## 2022-10-27 RX ORDER — IBUPROFEN 400 MG/1
800 TABLET, FILM COATED ORAL EVERY 6 HOURS PRN
Status: DISCONTINUED | OUTPATIENT
Start: 2022-10-27 | End: 2022-10-28 | Stop reason: HOSPADM

## 2022-10-27 RX ORDER — MODIFIED LANOLIN
OINTMENT (GRAM) TOPICAL
Status: DISCONTINUED | OUTPATIENT
Start: 2022-10-27 | End: 2022-10-28 | Stop reason: HOSPADM

## 2022-10-27 RX ORDER — MISOPROSTOL 100 UG/1
400 TABLET ORAL
Status: DISCONTINUED | OUTPATIENT
Start: 2022-10-27 | End: 2022-10-27 | Stop reason: HOSPADM

## 2022-10-27 RX ORDER — PROCHLORPERAZINE MALEATE 10 MG
10 TABLET ORAL EVERY 6 HOURS PRN
Status: DISCONTINUED | OUTPATIENT
Start: 2022-10-27 | End: 2022-10-27 | Stop reason: HOSPADM

## 2022-10-27 RX ADMIN — Medication 10 ML/HR: at 09:40

## 2022-10-27 RX ADMIN — SODIUM CHLORIDE, POTASSIUM CHLORIDE, SODIUM LACTATE AND CALCIUM CHLORIDE: 600; 310; 30; 20 INJECTION, SOLUTION INTRAVENOUS at 10:58

## 2022-10-27 RX ADMIN — ACETAMINOPHEN 650 MG: 325 TABLET ORAL at 16:29

## 2022-10-27 RX ADMIN — IBUPROFEN 800 MG: 400 TABLET, FILM COATED ORAL at 19:38

## 2022-10-27 RX ADMIN — Medication 100 MCG: at 11:13

## 2022-10-27 RX ADMIN — ONDANSETRON 4 MG: 2 INJECTION INTRAMUSCULAR; INTRAVENOUS at 10:11

## 2022-10-27 RX ADMIN — SODIUM CHLORIDE, POTASSIUM CHLORIDE, SODIUM LACTATE AND CALCIUM CHLORIDE: 600; 310; 30; 20 INJECTION, SOLUTION INTRAVENOUS at 09:22

## 2022-10-27 RX ADMIN — Medication 100 MCG: at 10:05

## 2022-10-27 RX ADMIN — SODIUM CHLORIDE, POTASSIUM CHLORIDE, SODIUM LACTATE AND CALCIUM CHLORIDE 1000 ML: 600; 310; 30; 20 INJECTION, SOLUTION INTRAVENOUS at 08:28

## 2022-10-27 RX ADMIN — Medication 340 ML/HR: at 12:40

## 2022-10-27 RX ADMIN — IBUPROFEN 800 MG: 400 TABLET, FILM COATED ORAL at 13:50

## 2022-10-27 RX ADMIN — SODIUM CHLORIDE, POTASSIUM CHLORIDE, SODIUM LACTATE AND CALCIUM CHLORIDE: 600; 310; 30; 20 INJECTION, SOLUTION INTRAVENOUS at 11:13

## 2022-10-27 RX ADMIN — LIDOCAINE HYDROCHLORIDE AND EPINEPHRINE 3 ML: 15; 5 INJECTION, SOLUTION EPIDURAL at 09:40

## 2022-10-27 ASSESSMENT — ACTIVITIES OF DAILY LIVING (ADL)
ADLS_ACUITY_SCORE: 18

## 2022-10-27 NOTE — ANESTHESIA PROCEDURE NOTES
"Epidural catheter Procedure Note    Pre-Procedure   Staff -        CRNA: Kellerman, David, APRN CRNA       Performed By: CRNA       Location: OB       Procedure Start/Stop Times: 10/27/2022 9:33 AM and 10/27/2022 9:40 AM       Pre-Anesthestic Checklist: patient identified, IV checked, risks and benefits discussed, informed consent, monitors and equipment checked, pre-op evaluation and post-op pain management  Timeout:       Correct Patient: Yes        Correct Procedure: Yes        Correct Site: Yes        Correct Position: Yes   Procedure Documentation  Procedure: epidural catheter       Diagnosis: Pregnant       Patient Position: sitting       Patient Prep/Sterile Barriers: sterile gloves, mask, patient draped       Skin prep: Betadine       Local skin infiltrated with mL of 1% lidocaine.        Insertion Site: L2-3. (midline approach).       Technique: LORT air        ARMIDA at 4 cm.       Needle Type: [employer] needle       Needle Gauge: 17.        Needle Length (Inches): 3.5        Catheter: 19 G.          Catheter threaded easily.         6 cm epidural space.         Threaded 12 cm at skin.         # of attempts: 1 and  # of redirects:  0    Assessment/Narrative         Paresthesias: No.       Test dose of 3 mL lidocaine 1.5% w/ 1:200,000 epinephrine at 09:40 CDT.        .       Insertion/Infusion Method: LORT air       No aspiration negative for Heme or CSF via Epidural Catheter.    Medication(s) Administered   Medication Administration Time: 10/27/2022 9:33 AM      FOR [facility] ONLY:   Pain Team Contact information: please page the Pain Team Via [employer]. Search \"Pain\". During daytime hours, please page the attending first. At night please page the resident first.    "

## 2022-10-27 NOTE — PLAN OF CARE
Pt up to the bathroom, voided. Pt reports minimal pain, Tylenol and ibuprofen given. Fundus firm, at umbilicus, light bleeding, no clots. Vital signs stable.   Mai Braxton RN on 10/27/2022 at 6:49 PM\    /60   Pulse 85   Temp 97.7  F (36.5  C) (Temporal)   Resp 16   LMP 01/15/2022   SpO2 96%   Breastfeeding Unknown

## 2022-10-27 NOTE — PROGRESS NOTES
Intrapartum Progress Note    S: Patient is more comfortable now with her epidural.     O: /70   Pulse 97   Temp 97.9  F (36.6  C) (Temporal)   Resp 16   LMP 01/15/2022   SpO2 100%    Gen: resting in bed, NAD  Cvx: 8/90/-2    FHT: 150, mod variability, + accels, no decels  Machias: 4 contractions in 10 min    Membranes: AROM- clear    A/P: 31 year old  at 39w6d by 8w0d US admitted for spontaneous labor  FWB: Cat I, reactive. EFW 7.5 lbs  Labor: spontaneous, now s/p AROM  Pain: epidural  GBS: negative  Rh: positive  Rubella: immune  Continue routine labor management.   Anticipate     Lizeth Langston MD 11:25 AM

## 2022-10-27 NOTE — L&D DELIVERY NOTE
OB Vaginal Delivery Note    Gerda Hill MRN# [id number]   Age: 31 year old YOB: 1990       GA: 39w6d  GP:   Labor Complications: None   EBL:   mL  Delivery QBL:    Delivery Type: Vaginal, Spontaneous   ROM to Delivery Time: (Delivered) Hours: 1 Minutes: 13  [place] Weight: 3.728 kg (8 lb 3.5 oz)    1 Minute 5 Minute 10 Minute   Apgar Totals: 8   9        RUBY CASTAÑEDA;VELASQUEZ INIGUEZ     Delivery Details:  Gerda Hill, a 31 year old  who presented at 39w6d in active spontaneous labor. She arrived at 4cm and spontaneously progressed to 8cm at which time she received an epidural for labor analgesia. AROM was performed for clear fluid and she progressed to complete dilation over the next hour. She pushed effectively for 4 contractions and delivered a female infant, OA position. <30 second shoulder dystocia was relieved with Brenda position, suprapubic pressure and wood screw maneuver. Apgars 8/9. Weight 3728g.  Placenta did not spontaneous delivery and was manually extracted, appeared intact. Manual sweep after removal confirmed complete removal of placenta tissue. No lacerations. QBL 96.    Ruby Castañeda MD FACOG  OB/GYN  10/27/2022 1:25 PM         Jeremy Hill-Gerda [[id number]]    Labor Event Times    Labor onset date: 10/27/22 Onset time:  7:15 AM   Dilation complete date: 10/27/22 Complete time: 12:20 PM   Start pushing date/time: 10/27/2022 1230      Labor Length    1st Stage (hrs): 5 (min): 5   2nd Stage (hrs): 0 (min): 15   3rd Stage (hrs): 0 (min): 14      Labor Events     labor?: No   steroids: None  Labor Type: Spontaneous  Predominate monitoring during 1st stage: continuous electronic fetal monitoring, intermittent electronic fetal monitoring     Antibiotics received during labor?: No     Rupture date/time: 10/27/22 1122   Rupture type: Artificial Rupture of Membranes  Fluid color: Clear  Fluid odor: Normal     Augmentation: None  1:1  "continuous labor support provided by?: RN       Delivery/Placenta Date and Time    Delivery Date: 10/27/22 Delivery Time: 12:35 PM   Placenta Date/Time: 10/27/2022 12:49 PM   Other personnel present at delivery:  Provider Role   Lizeth Langston MD Obstetrician   Dolores Ellis, RN          Vaginal Counts     Initial count performed by 2 team members:  Two Team Members   Brina Braxton       Needles Suture Needles Sponges (RETIRED) Instruments   Initial counts 1  6    Added to count 0  0    Relief counts       Final counts 1  6          Placed during labor Accounted for at the end of labor   FSE No NA   IUPC No NA   Cervidil No NA              Final count performed by 2 team members:  Two Team Members   MD Mai Junior         Apgars    Living status: Living   1 Minute 5 Minute 10 Minute 15 Minute 20 Minute   Skin color: 1  1       Heart rate: 2  2       Reflex irritability: 2  2       Muscle tone: 2  2       Respiratory effort: 1  2       Total: 8  9       Apgars assigned by: MAI BRAXTON     Cord    Vessels: 3 Vessels    Cord Complications: None               Cord Blood Disposition: Lab    Gases Sent?: No    Delayed cord clamping?: Yes    Cord Clamping Delay (seconds): 31-60 seconds    Stem cell collection?: No       [place] Resuscitation    Methods: None   Care at Delivery: Infant female born via SDV 10/27/2022 at 1235 by Dr. Lizeth Langston. Infant was immediately placed skin to skin with mother. Bulb nose suctioning and stimulation. APGARs 8/9. Infant ID bands were placed.  transition education completed at this time.     [place] Measurements    Weight: 8 lb 3.5 oz Length: 1' 9\"   Head circumference: 34.3 cm Chest circumference: 14.5 cm      Skin to Skin and Feeding Plan    Skin to skin initiation date/time: 1/10/1841    Skin to skin with: Mother  Skin to skin end date/time:        Labor Events and Shoulder Dystocia    Fetal Tracing Prior " to Delivery: Category 2  Shoulder dystocia present?: Pos  Anterior shoulder: right    Gentle attempt at traction, assisted by maternal expulsive forces?: Yes       First Maneuver: Brenda maneuver, Suprapubic pressure, Woods screw maneuver       Delivery (Maternal) (Provider to Complete) ([id number])    Episiotomy: None  Perineal lacerations: None    Repair suture: None  Genital tract inspection done: Pos     Blood Loss  Mother: Corazon Hill #[id number]   Start of Mother's Information    Delivery Blood Loss  10/27/22 0715 - 10/27/22 1659    Delivery QBL (mL) Hospital Encounter 60 mL    Postpartum QBL (mL) Hospital Encounter 36 mL    Total  96 mL         End of Mother's Information  Mother: Corazon Hill #[id number]          Delivery - Provider to Complete ([id number])    Delivery Type (Choose the 1 that will go to the Birth History): Vaginal, Spontaneous                   Other personnel:  Provider Role   Lizeth Langston MD Obstetrician   Dolores Ellis, RN                 Placenta    Date/Time: 10/27/2022 12:49 PM  Removal: Manual Removal  Disposition: Hospital disposal           Anesthesia    Method: Epidural  Cervical dilation at placement: 4-7                Presentation and Position    Presentation: Vertex    Position: Middle Occiput Anterior                 Lizeth Langston MD

## 2022-10-27 NOTE — PROGRESS NOTES
Pt here reporting contractions every 3-5 minutes since 0430. Cervix 3-4cm. Pt placed on EFM, , category 1 tracing. Contractions every 3-5 per palpation and pt statement.  Will at bedside. MD at bedside.   Mai Braxton RN on 10/27/2022 at 8:19 AM

## 2022-10-27 NOTE — PROGRESS NOTES
Infant female born via SDV 10/27/2022 at 1235 by Dr. Lizeth Langston. Infant was immediately placed skin to skin with mother. Bulb nose suctioning and stimulation. APGARs 8/9. Infant ID bands were placed.  transition education completed at this time.

## 2022-10-27 NOTE — H&P
[facility] Labor and Delivery History and Physical    Debra Hill MRN# [id number]   Age: 31 year old YOB: 1990     Date of Admission:  10/27/2022    Primary care provider: No Ref-Primary, Physician           Chief Complaint:   Debra Hill is a 31 year old  at 39w6d by 8w0d US admitted for spontaneous labor. Had her membranes stripped yesterday afternoon. Ctx started around 0400 and have increased in intensity. No LOF. +FM          Pregnancy history:     OBSTETRIC HISTORY:    OB History    Para Term  AB Living   2 1 1 0 0 1   SAB IAB Ectopic Multiple Live Births   0 0 0 0 1      # Outcome Date GA Lbr Cesar/2nd Weight Sex Delivery Anes PTL Lv   2 Current            1 Term 19 39w3d 13:00 / 01:18 3.243 kg (7 lb 2.4 oz) F Vag-Spont INT N EMILIANO      Name: ,FEMALE-DEBRA      Apgar1: 9  Apgar5: 9       EDC: Estimated Date of Delivery: Oct 28, 2022    Prenatal Labs:   Lab Results   Component Value Date    ABO B 2019    RH Pos 2019    AS Negative 10/27/2022    HEPBANG Nonreactive 2018    CHPCRT Not Detected 2022    GCPCRT Not Detected 2022    RPR Nonreactive 2018    RPR Nonreactive 2018    HGB 13.1 10/27/2022       GBS Status: negative    Active Problem List  Patient Active Problem List   Diagnosis     Encounter for triage in pregnant patient     Normal labor and delivery      (spontaneous vaginal delivery)     Urinary retention     Anemia due to blood loss, acute     Encounter for supervision of other normal pregnancy in third trimester       Medication Prior to Admission  Medications Prior to Admission   Medication Sig Dispense Refill Last Dose     ibuprofen (ADVIL/MOTRIN) 600 MG tablet Take 1 tablet (600 mg) by mouth every 6 hours as needed for other (cramping) (Patient not taking: Reported on 9/15/2022) 20 tablet 0      Misc. Devices (BREAST PUMP) MISC Dispense 1 double electric breast pump  for expressing breast milk for human infant 1 each 0      omeprazole (PRILOSEC) 10 MG DR capsule Take 20 mg by mouth daily        ondansetron (ZOFRAN-ODT) 4 MG ODT tab Take 1 tablet (4 mg) by mouth every 8 hours as needed for nausea 20 tablet 0      Prenatal Vit-Fe Fumarate-FA (PRENATAL MULTIVITAMIN W/IRON) 27-0.8 MG tablet Take 1 tablet by mouth daily 90 tablet 3    .        Maternal Past Medical History:     Past Medical History:   Diagnosis Date     Anemia due to blood loss, acute 3/30/2019     Past Surgical History:   Procedure Laterality Date     BUNIONECTOMY       SINUS SURGERY                         Family History:     Family History   Problem Relation Age of Onset     Colitis Mother         colitis     Clotting Disorder Mother      Breast Cancer Maternal Grandmother         late 50s     No Known Problems Daughter         b. 2019     Family history reviewed and updated in [facility]            Social History:     Social History     Tobacco Use     Smoking status: Never     Smokeless tobacco: Never   Substance Use Topics     Alcohol use: No            Review of Systems:   The Review of Systems is negative other than noted in the HPI          Physical Exam:     Patient Vitals for the past 8 hrs:   BP Temp Temp src Pulse Resp SpO2   10/27/22 0744 118/83 -- -- -- -- 99 %   10/27/22 0743 -- 97.5  F (36.4  C) Temporal 97 16 --     Gen: breathing through contractions, appears uncomfortable  CV: RRR  Resp: CTAB  Abd: gravid, soft, nontender  Ext: nontender    Cervix: 6/90/-2  Membranes: bulging bag  EFW: 7.5 lbs  Presentation:Cephalic    Fetal Heart Rate Tracin, mod variability, + accels, no decels  Tocometer: not tracing well with patient positioning, about 4 in 10 min        Assessment:   Gerda Hill is a 31 year old  at 39w6d admitted with spontaneous active labor.          Plan:   FWB: Cat I, reactive. EFW 7.5 lbs  Labor: spontaneous  Pain: desires epidural  Rh positive, RI, GBS negative  Anticipate  PRISCILA Langston MD

## 2022-10-27 NOTE — PROGRESS NOTES
Epidural completed by CRNA, Time out performed prior to insertion. Pt verbalized consent. PT resting in bed at this time, states contractions are more manageable.  at bedside.   Mai Braxton RN on 10/27/2022 at 9:53 AM

## 2022-10-27 NOTE — ANESTHESIA PREPROCEDURE EVALUATION
Anesthesia Pre-Procedure Evaluation    Patient: Gerda Hill   MRN: [id number] : 1990        Procedure : * No procedures listed *          Past Medical History:   Diagnosis Date     Anemia due to blood loss, acute 3/30/2019      Past Surgical History:   Procedure Laterality Date     BUNIONECTOMY       SINUS SURGERY        Allergies   Allergen Reactions     Quasense Itching      Social History     Tobacco Use     Smoking status: Never     Smokeless tobacco: Never   Substance Use Topics     Alcohol use: No      Wt Readings from Last 1 Encounters:   10/26/22 72.6 kg (160 lb)        Anesthesia Evaluation   Pt has had prior anesthetic. Type: Regional.        ROS/MED HX  ENT/Pulmonary:  - neg pulmonary ROS     Neurologic:  - neg neurologic ROS     Cardiovascular:       METS/Exercise Tolerance: >4 METS    Hematologic:     (+) anemia,     Musculoskeletal:  - neg musculoskeletal ROS     GI/Hepatic:     (+) GERD,     Renal/Genitourinary:  - neg Renal ROS     Endo:  - neg endo ROS     Psychiatric/Substance Use:  - neg psychiatric ROS     Infectious Disease:  - neg infectious disease ROS     Malignancy:  - neg malignancy ROS     Other:      (+) Possibly pregnant, ,         Physical Exam    Airway        Mallampati: II   TM distance: > 3 FB   Neck ROM: full   Mouth opening: > 3 cm    Respiratory Devices and Support         Dental  no notable dental history         Cardiovascular   cardiovascular exam normal       Rhythm and rate: regular and normal     Pulmonary   pulmonary exam normal        breath sounds clear to auscultation           OUTSIDE LABS:  CBC:   Lab Results   Component Value Date    WBC 13.0 (H) 10/27/2022    WBC 12.6 (H) 2019    HGB 13.1 10/27/2022    HGB 9.4 (L) 2019    HCT 38.3 10/27/2022    HCT 36.1 2019     10/27/2022     2019     BMP: No results found for: NA, POTASSIUM, CHLORIDE, CO2, BUN, CR, GLC  COAGS: No results found for: PTT, INR, FIBR  POC:   Lab  Results   Component Value Date    HCG Negative 05/10/2019     HEPATIC: No results found for: ALBUMIN, PROTTOTAL, ALT, AST, GGT, ALKPHOS, BILITOTAL, BILIDIRECT, REBEL  OTHER: No results found for: PH, LACT, A1C, KASH, PHOS, MAG, LIPASE, AMYLASE, TSH, T4, T3, CRP, SED    Anesthesia Plan    ASA Status:  2   NPO Status:  NPO Appropriate    Anesthesia Type: Epidural.              Consents    Anesthesia Plan(s) and associated risks, benefits, and realistic alternatives discussed. Questions answered and patient/representative(s) expressed understanding.     - Discussed: Risks, Benefits and Alternatives for the PROCEDURE were discussed     - Discussed with:  Patient         Postoperative Care            Comments:                David Kellerman, APRN CRNA

## 2022-10-28 VITALS
DIASTOLIC BLOOD PRESSURE: 75 MMHG | TEMPERATURE: 97.5 F | OXYGEN SATURATION: 97 % | SYSTOLIC BLOOD PRESSURE: 107 MMHG | HEART RATE: 85 BPM | RESPIRATION RATE: 16 BRPM

## 2022-10-28 LAB
HGB BLD-MCNC: 10.6 G/DL (ref 11.7–15.7)
T PALLIDUM AB SER QL: NONREACTIVE

## 2022-10-28 PROCEDURE — 36415 COLL VENOUS BLD VENIPUNCTURE: CPT | Performed by: OBSTETRICS & GYNECOLOGY

## 2022-10-28 PROCEDURE — 85018 HEMOGLOBIN: CPT | Performed by: OBSTETRICS & GYNECOLOGY

## 2022-10-28 PROCEDURE — 250N000013 HC RX MED GY IP 250 OP 250 PS 637: Performed by: OBSTETRICS & GYNECOLOGY

## 2022-10-28 RX ADMIN — ACETAMINOPHEN 650 MG: 325 TABLET ORAL at 10:54

## 2022-10-28 RX ADMIN — DOCUSATE SODIUM 100 MG: 100 CAPSULE, LIQUID FILLED ORAL at 10:54

## 2022-10-28 RX ADMIN — IBUPROFEN 800 MG: 400 TABLET, FILM COATED ORAL at 05:57

## 2022-10-28 RX ADMIN — IBUPROFEN 800 MG: 400 TABLET, FILM COATED ORAL at 13:46

## 2022-10-28 RX ADMIN — ACETAMINOPHEN 650 MG: 325 TABLET ORAL at 04:20

## 2022-10-28 ASSESSMENT — ACTIVITIES OF DAILY LIVING (ADL)
ADLS_ACUITY_SCORE: 18

## 2022-10-28 NOTE — PROGRESS NOTES
DISCHARGE NOTE    Patient discharged to home at 3:30 PM via ambulation. Accompanied by spouse and staff. Discharge instructions reviewed with patient, opportunity offered to ask questions. Prescriptions - None ordered for discharge. All belongings sent with patient. 4-part ID bands matched with baby's.     Cecil Case RN

## 2022-10-28 NOTE — ANESTHESIA CARE TRANSFER NOTE
Patient: Gerda Hill    Procedure: * No procedures listed *       Diagnosis: * No pre-op diagnosis entered *  Diagnosis Additional Information: No value filed.    Anesthesia Type:   Epidural     Note:      Level of Consciousness: awake  Oxygen Supplementation: room air    Independent Airway: airway patency satisfactory and stable    Vital Signs Stable: post-procedure vital signs reviewed and stable  Report to RN Given: handoff report given  Patient transferred to: Labor and Delivery    Handoff Report: Identifed the Patient, Identified the Reponsible Provider, Reviewed the pertinent medical history, Discussed the surgical course, Reviewed Intra-OP anesthesia mangement and issues during anesthesia, Set expectations for post-procedure period and Allowed opportunity for questions and acknowledgement of understanding      Vitals:  Vitals Value Taken Time   BP     Temp     Pulse     Resp     SpO2         Electronically Signed By: RAMIRO MCGARRY CRNA  October 28, 2022  7:35 AM

## 2022-10-28 NOTE — PROGRESS NOTES
" Postpartum Rounding Progress Note    Ms. Hill is PPD #1 today after an uncomplicated . She reports feeling well with no acute concerns at this time. She is eager to go home. Her bleeding has slowed down and she does not endorse any clots. Pain has been well controlled. She has been up and ambulating well, without feeling light-headed or dizzy. Tolerating normal diet, has been able to urinate normally and is passing flatus but has not yet had a bowel movement. No concerns for leg pain or calf pain.  She reports her mood is good. We discussed what to expect as she recovers at home including continued, scant lochia, mood fluctuations and uterine cramping, especially with breastfeeding.       Exam  Vitals:  BP Readings from Last 1 Encounters:   10/27/22 106/65     Pulse Readings from Last 1 Encounters:   10/27/22 85     Wt Readings from Last 1 Encounters:   10/26/22 72.6 kg (160 lb)     Ht Readings from Last 1 Encounters:   10/12/22 1.6 m (5' 3\")     Estimated body mass index is 28.34 kg/m  as calculated from the following:    Height as of 10/12/22: 1.6 m (5' 3\").    Weight as of 10/26/22: 72.6 kg (160 lb).  Temp Readings from Last 1 Encounters:   10/27/22 97.3  F (36.3  C) (Temporal)       General: No acute distress, well-appearing  CV: Normal rate, no pallor  Lungs: Non-labored respirations    Assessment & Plan  Ms. Hill is a 31 y.o.  s/p  at 39w6d for spontaneous labor following an uncomplicated pregnancy.    #PPD 1  -- Meeting milestones appropriately  -- Lochia bleeding w/o clots  -- Pain adequately controlled with PRNs  -- Tolerating regular diet and ambulating well    # IWB  -- Infant at bedside, doing well per mom  -- Breast feeding    # PNL  --Rh+, Rubella immune, GBS neg    # Contraception  --Discussed options with the patient   --Patient has decided on Mirena at follow up visit    # Disposition  --Continue routine postpartum care  --Anticipate discharge today after 24 hours  --Follow up in " clinic in 6 weeks    MARY ELLEN MUHAMMAD MD on 10/28/2022 at 9:13 AM

## 2022-10-28 NOTE — PLAN OF CARE
Goal Outcome Evaluation       Patient is A&O x3. Denies pain at this time. Respiratory WNL, Lung sounds clear, equal billaterally. Cardiac WDL. GI WDL. Bowel sounds active in all quadrants. Skin dry, warm and intact, denies numbness and tingling. Dependent trace edema in lower extremities. Uterus WDL, fundus midline, firm without massage, 1cm below umbilicus. Lochia WDL, rubra, light amount. Musculoskeletal WDL.

## 2022-10-28 NOTE — PLAN OF CARE
/65   Pulse 85   Temp 97.3  F (36.3  C) (Temporal)   Resp 16   LMP 01/15/2022   SpO2 97%   Breastfeeding Unknown     Patient independent in the room. Attentive to infant needs. Breast feeding independently, hand expressing independently when necessary. Denies pain. Fundus firm, bleeding light.

## 2022-10-28 NOTE — ANESTHESIA POSTPROCEDURE EVALUATION
Patient: Gerda Hill    Procedure: * No procedures listed *       Anesthesia Type:  Epidural    Note:  Disposition: Inpatient   Postop Pain Control: Uneventful            Sign Out: Well controlled pain   PONV: No   Neuro/Psych: Uneventful            Sign Out: Acceptable/Baseline neuro status   Airway/Respiratory: Uneventful            Sign Out: Acceptable/Baseline resp. status   CV/Hemodynamics: Uneventful            Sign Out: Acceptable CV status   Other NRE: NONE   DID A NON-ROUTINE EVENT OCCUR? No           Last vitals:  Vitals:    10/27/22 1942 10/27/22 2056 10/27/22 2341   BP: 118/80 107/75 106/65   Pulse:      Resp: 16 16 16   Temp: 97.9  F (36.6  C) (!) 96.6  F (35.9  C) 97.3  F (36.3  C)   SpO2: 99% 97%        Electronically Signed By: RAMIRO MCGARRY CRNA  October 28, 2022  7:35 AM

## 2022-10-28 NOTE — DISCHARGE SUMMARY
Admit date: 10/27/2022  Discharge date: 10/28/2022    Admit Dx:   - 31 year old  at 39w6d   - Spontaneous labor    Discharge Dx:  - Same as above, s/p     Procedures:  -     Admit HPI:  Ms. Gerda Hill is a   at 39w6d who was admitted for spontaneous labor on 10/27/2022. Please see her admit H&P for full details of her PMH, PSH, Meds, Allergies and exam on admit.    Hospital course:  Gerda Hill was admitted to the hospital on 10/27/2022 for the above listed indications. Her delivery was uncomplicated. Her postpartum course is also uncomplicated.    Discharge Medications:  Current Discharge Medication List      CONTINUE these medications which have NOT CHANGED    Details   Misc. Devices (BREAST PUMP) [facility] Dispense 1 double electric breast pump for expressing breast milk for human infant  Qty: 1 each, Refills: 0    Associated Diagnoses: Breast feeding status of mother      Prenatal Vit-Fe Fumarate-FA (PRENATAL MULTIVITAMIN W/IRON) 27-0.8 MG tablet Take 1 tablet by mouth daily  Qty: 90 tablet, Refills: 3    Associated Diagnoses: Encounter for preconception consultation         STOP taking these medications       ibuprofen (ADVIL/MOTRIN) 600 MG tablet Comments:   Reason for Stopping:         omeprazole (PRILOSEC) 10 MG DR capsule Comments:   Reason for Stopping:         ondansetron (ZOFRAN-ODT) 4 MG ODT tab Comments:   Reason for Stopping:               Discharge/Disposition:  Gerda Hill was discharged to home in stable condition     MARY ELLEN MUHAMMAD MD on 10/28/2022 at 9:16 AM

## 2022-10-28 NOTE — LACTATION NOTE
This note was copied from a baby's chart.  INPATIENT LACTATION CONSULT      Consult with Corazon and lexi regarding breastfeeding. Corazon nursed her first child who is now 3 years old for 9 months with no problems. Obvious rooting with a strong latch observed this feeding session.  Rhythmic and aggressive suckling also noted.  Instructed Corazon on correct positioning and technique when latching babe on.  Corazon is independent with latching babe onto breast.  Minimal assistance required.  Encouraged Corazon on the importance of frequent feedings throughout the day (at least 8-12 feedings in a 24 hour period) and skin to skin contact.  Corazon demonstrated and states she understands all information given.    Kathya Jenkins RN, IBCLC  Lactation Consultant  [facility]

## 2022-10-31 ENCOUNTER — LACTATION ENCOUNTER (OUTPATIENT)
Age: 32
End: 2022-10-31

## 2022-10-31 ENCOUNTER — HOSPITAL ENCOUNTER (OUTPATIENT)
Dept: OBGYN | Facility: OTHER | Age: 32
Discharge: HOME | End: 2022-10-31
Attending: OBSTETRICS & GYNECOLOGY
Payer: COMMERCIAL

## 2022-10-31 PROCEDURE — S9443 LACTATION CLASS: HCPCS | Performed by: REGISTERED NURSE

## 2022-10-31 NOTE — LACTATION NOTE
This note was copied from a baby's chart.  Outpatient Lactation Visit    Female-Gerda Hill  [id number]    Consultation Date: 2022     Reason for Lactation Referral: Initial Lactation Consult    Baby's : 10/27/2022    Baby's Current Age: 4 day old  Baby's Gestational Age: Gestational Age: 39w6d    Primary Care Provider: No primary care provider on file.    Presenting Problem (concerns as stated by parent): no concerns - babe is at 11% for weight loss. Milk is in yesterday    MATERNAL HISTORY   History of Breast Surgery: no  Breast Changes During Pregnancy: no  Breast Feeding History: nursed first daughter for 9 months  Maternal Meds: daily prenatal vitamin  Pregnancy Complications: none  Anesthesia during labor: epidural    MATERNAL ASSESSMENT    Breast Size: average, symmetrical, soft after feeding and filling prior to feeding  Nipple Appearance - Left: slightly cracked, with signs of healing, education on further healing techniques provided  Nipple Appearance - Right: slightly cracked, with signs of healing, education on further healing techniques provided  Nipple Erectility - Left: erect with stimulation  Nipple Erectility - Right: erect with stimulation  Areolas Compressibility: soft  Nipple Size: average  Special Equipment Used: none  Day mother reports milk came in:  Day 3    INFANT ASSESSMENT    Oral Anatomy  Mouth: normal  Palate: normal  Jaw: normal  Tongue: normal  Frenulum: normal   Digital Suck Exam: root    FEEDING   Feeding Time: aggressively for 20 minutes  Position:  cradle  Effort to Latch: awake and alert, latched easily  Duration of Breast Feeding: Right Breast: 0; Left Breast: 20  Results: excellent breast feed    Volume of Intake:    Birth Weight: 8 lb 3.5 oz    Hospital discharge weight: 8 lb 0.8 oz (discharged after 24 hours)    Today's Weight 7 lb  4 oz    Total Intake: 0.5 oz (nursed right before appt.)  Output: 3-4 soil diapers in last 24 hours, 3-4 wet diapers in last 24  hours    LATCH Score:   Latch: 2 - Good Latch  Audible Swallowin - Spontaneous & frequent  Type of Nipple: (Breast/Nipple) 2 - Everted  Comfort: 2 - Soft, Nontender  Hold: 2 - No Assist   Total LATCH Score:  10    FEEDING PLAN    Home Feeding Plan: Continue to feed on demand when  elicits feeding cues with deep latch.  Babe should be eating 8-12 times in a 24 hour period.  Exclusivity explained and encouraged in the early weeks to establish breastfeeding and order in milk supply.  Rooming-in encouraged with explanation of the benefits.  Continue to apply expressed breast milk and Lanolin cream to nipples after feedings for healing and comfort.  Postpartum breastfeeding assessment completed and education provided.  Items included in the education are:     proper positioning and latch    effectiveness of feeding    manual expression    handling and storing breastmilk    maintenance of breastfeeding for the first 6 months    sign/symptoms of infant feeding issues requiring referral to qualified health care provider    LACTATION COMMENTS   Deep latch explained for proper positioning of breast in infant's mouth, maximizing milk transfer and comfort.  Reassurance and encouragement provided in regard to mom's concerns about milk supply.  Follow-up support information provided.  Parents plan to keep [place] Well-Child Check with Dr. Delgado as scheduled for 2 week well child check.      Face-to-face Time: 60 minutes with assessment and education.    Kathya Jenkins RN  10/31/2022  10:29 AM

## 2022-11-09 ENCOUNTER — MEDICAL CORRESPONDENCE (OUTPATIENT)
Dept: HEALTH INFORMATION MANAGEMENT | Facility: OTHER | Age: 32
End: 2022-11-09

## 2022-11-16 ENCOUNTER — HOSPITAL ENCOUNTER (OUTPATIENT)
Dept: OBGYN | Facility: OTHER | Age: 32
Discharge: HOME | End: 2022-11-16
Attending: FAMILY MEDICINE
Payer: COMMERCIAL

## 2022-11-16 ENCOUNTER — LACTATION ENCOUNTER (OUTPATIENT)
Age: 32
End: 2022-11-16

## 2022-11-16 PROCEDURE — S9443 LACTATION CLASS: HCPCS | Performed by: REGISTERED NURSE

## 2022-11-16 NOTE — LACTATION NOTE
This note was copied from a baby's chart.  Natty is a 2 week old infant here with mom Corazon for a weight check. Natty was last seen on 11/9/2022 for a well child check and her weight at the visit was 7 lb 10 oz. Natty is strictly breastfeeding and Corazon states she is nursing every 2-3 hours with no problems.    Natty's weight today is 8 lb 1 oz showing a weight gain of 7 oz in 7 days which is within normal range of weight gain for a  baby.    Natty will follow-up with Dr. Delgado as scheduled for her next well child check. Instructed Corazon to return if she has any questions or concerns.

## 2022-11-18 ENCOUNTER — OFFICE VISIT (OUTPATIENT)
Dept: FAMILY MEDICINE | Facility: OTHER | Age: 32
End: 2022-11-18
Attending: NURSE PRACTITIONER
Payer: COMMERCIAL

## 2022-11-18 VITALS
OXYGEN SATURATION: 97 % | RESPIRATION RATE: 14 BRPM | DIASTOLIC BLOOD PRESSURE: 64 MMHG | HEIGHT: 63 IN | HEART RATE: 81 BPM | BODY MASS INDEX: 24.98 KG/M2 | SYSTOLIC BLOOD PRESSURE: 112 MMHG | WEIGHT: 141 LBS | TEMPERATURE: 98.2 F

## 2022-11-18 DIAGNOSIS — J06.9 VIRAL URI WITH COUGH: Primary | ICD-10-CM

## 2022-11-18 PROCEDURE — 99213 OFFICE O/P EST LOW 20 MIN: CPT | Performed by: NURSE PRACTITIONER

## 2022-11-18 ASSESSMENT — PAIN SCALES - GENERAL: PAINLEVEL: EXTREME PAIN (8)

## 2022-11-18 NOTE — NURSING NOTE
Patient presents to clinic with sinus pain and pressure 2 days  Priscilla Harris LPN ....................  11/18/2022   1:14 PM

## 2022-11-18 NOTE — PROGRESS NOTES
ASSESSMENT/PLAN:    I have reviewed the nursing notes.  I have reviewed the findings, diagnosis, plan and need for follow up with the patient.    1. Viral URI with cough  Discussed with Corazon that 2 days of viral / URI symptoms does not diagnose a sinus infection that would require antibiotics. Majority of sinus symptoms/infections are caused by viruses. She likely has same viral illness that her son is experiencing as they are here together today. Recommend breastfeeding safe OTC medications, fluids, time. Explained RSV going around the community and often presents like this in adults. Testing for RSV would not change treatment plan. Declines covid test today. Follow up if worsening condition or worsening sinus symptoms beyond 10-14 days without any signs of improvement.   - Symptomatic treatment - Encouraged fluids, salt water gargles, honey (only if greater than 1 year in age due to risk of botulism), elevation, humidifier, sinus rinse/netti pot, lozenges, tea, topical vapor rub, popsicles, rest, etc   -May use over-the-counter Tylenol or ibuprofen PRN    Discussed warning signs/symptoms indicative of need to f/u    Follow up if symptoms persist or worsen or concerns    I explained my diagnostic considerations and recommendations to the patient, who voiced understanding and agreement with the treatment plan. All questions were answered. We discussed potential side effects of any prescribed or recommended therapies, as well as expectations for response to treatments.    Chinyere Pierre NP  11/18/2022  1:52 PM    HPI:  Gerda Hill is a 31 year old female who presents to [facility] today for concerns of sinus pain and pressure for 2 days. Any time she leans forward, sinuses throb. Her 3 year old daughter is also ill with similar symptoms. Slight cough. No fevers. Slight sore throat. No ear pain.     Hx sinus infections but not since she had sinus surgery about 7 years ago.     ROS otherwise negative.  "      Past Medical History:   Diagnosis Date     Anemia due to blood loss, acute 3/30/2019     Past Surgical History:   Procedure Laterality Date     BUNIONECTOMY       SINUS SURGERY       Social History     Tobacco Use     Smoking status: Never     Smokeless tobacco: Never   Substance Use Topics     Alcohol use: No     Current Outpatient Medications   Medication Sig Dispense Refill     Misc. Devices (BREAST PUMP) [facility] Dispense 1 double electric breast pump for expressing breast milk for human infant 1 each 0     Prenatal Vit-Fe Fumarate-FA (PRENATAL MULTIVITAMIN W/IRON) 27-0.8 MG tablet Take 1 tablet by mouth daily 90 tablet 3     Allergies   Allergen Reactions     Quasense Itching     Past medical history, past surgical history, current medications and allergies reviewed and accurate to the best of my knowledge.      ROS:  Refer to HPI    /64   Pulse 81   Temp 98.2  F (36.8  C)   Resp 14   Ht 1.6 m (5' 3\")   Wt 64 kg (141 lb)   LMP 01/15/2022   SpO2 97%   Breastfeeding Yes   BMI 24.98 kg/m      EXAM:  General Appearance: Well appearing 31 year old female, appropriate appearance for age. No acute distress   Ears: Left TM intact, translucent with bony landmarks appreciated, no erythema, no effusion, no bulging, no purulence.  Right TM intact, translucent with bony landmarks appreciated, no erythema, no effusion, no bulging, no purulence.  Left auditory canal clear.  Right auditory canal clear.  Normal external ears, non tender.  Eyes: conjunctivae normal without erythema or irritation, corneas clear, no drainage or crusting, no eyelid swelling, pupils equal   Oropharynx: moist mucous membranes, posterior pharynx without erythema, tonsils symmetric, no erythema, no exudates or petechiae, + post nasal drip seen, no trismus, voice clear.    Sinuses:  + sinus tenderness upon palpation of the bilateral maxillary sinuses  Nose:  Bilateral nares: no erythema, no edema, + clear rhinorrhea   Neck: supple " without adenopathy  Respiratory: normal chest wall and respirations.  Normal effort.  Clear to auscultation bilaterally, no wheezing, crackles or rhonchi.  No increased work of breathing.  No cough appreciated.  Cardiac: RRR with no murmurs  Psychological: normal affect, alert, oriented, and pleasant.

## 2022-11-18 NOTE — PATIENT INSTRUCTIONS
Symptomatic treatment - Encouraged fluids, salt water gargles, honey (only if greater than 1 year in age due to risk of botulism), elevation, humidifier, sinus rinse/netti pot, lozenges, tea, topical vapor rub, popsicles, rest, etc     May use over-the-counter Tylenol or ibuprofen PRN    If worsening sinus pain/pressure/drainage over the next 10 + days, then this could be a bacterial sinus infection but right now antibiotics are not indicated. Suspect viral illness. Treat symptomatically.     Otc flonase nasal spray, lots of fluids.

## 2022-11-28 ENCOUNTER — OFFICE VISIT (OUTPATIENT)
Dept: FAMILY MEDICINE | Facility: OTHER | Age: 32
End: 2022-11-28
Attending: NURSE PRACTITIONER
Payer: COMMERCIAL

## 2022-11-28 VITALS
SYSTOLIC BLOOD PRESSURE: 100 MMHG | HEIGHT: 63 IN | WEIGHT: 142.9 LBS | DIASTOLIC BLOOD PRESSURE: 70 MMHG | TEMPERATURE: 98.5 F | HEART RATE: 75 BPM | RESPIRATION RATE: 20 BRPM | OXYGEN SATURATION: 98 % | BODY MASS INDEX: 25.32 KG/M2

## 2022-11-28 DIAGNOSIS — B37.89 CANDIDIASIS OF BREAST: Primary | ICD-10-CM

## 2022-11-28 PROCEDURE — 99213 OFFICE O/P EST LOW 20 MIN: CPT | Performed by: PHYSICIAN ASSISTANT

## 2022-11-28 RX ORDER — FLUCONAZOLE 200 MG/1
TABLET ORAL
Qty: 16 TABLET | Refills: 0 | Status: CANCELLED | OUTPATIENT
Start: 2022-11-28 | End: 2022-12-13

## 2022-11-28 RX ORDER — CLOTRIMAZOLE 1 %
CREAM (GRAM) TOPICAL 2 TIMES DAILY
Qty: 30 G | Refills: 0 | Status: SHIPPED | OUTPATIENT
Start: 2022-11-28 | End: 2024-02-02

## 2022-11-28 ASSESSMENT — PAIN SCALES - GENERAL: PAINLEVEL: MODERATE PAIN (4)

## 2022-11-29 NOTE — NURSING NOTE
"Chief Complaint   Patient presents with     Derm Problem     Thrush on breast from nursing         Initial /70   Pulse 75   Temp 98.5  F (36.9  C) (Tympanic)   Resp 20   Ht 1.6 m (5' 3\")   Wt 64.8 kg (142 lb 14.4 oz)   LMP 01/15/2022   SpO2 98%   Breastfeeding Yes   BMI 25.31 kg/m   Estimated body mass index is 25.31 kg/m  as calculated from the following:    Height as of this encounter: 1.6 m (5' 3\").    Weight as of this encounter: 64.8 kg (142 lb 14.4 oz).         Norma J. Gosselin, GIRISH   "

## 2022-11-29 NOTE — PROGRESS NOTES
ASSESSMENT/PLAN:    I have reviewed the nursing notes.  I have reviewed the findings, diagnosis, plan and need for follow up with the patient.    1. Candidiasis of breast  2. Breast feeding status of mother  - clotrimazole (LOTRIMIN) 1 % external cream; Apply topically 2 times daily  Dispense: 30 g; Refill: 0  - Presumed candidiasis of nipples - breast pain, shiny skin, mild flaking skin on nipples, + infant with oral thrush, therefore, will opt to treat for candidiasis per UTD recommendations. Discussed topicals such as Clotrimazole and Miconazole are recommended 1st line and oral therapy is only recommended if failure/incomplete relief with topical measures. We will trial Clotrimazole today, apply twice a day with clean hands to nipples. Remove additional medication prior to feeding. No signs of cellulitis at this time. If worsening of symptoms, fevers, chills, etc., recommend follow up. Patient is in agreement and understanding of the above treatment plan. All questions and concerns were addressed and answered to patient's satisfaction. AVS reviewed with patient.     Discussed warning signs/symptoms indicative of need to f/u    Follow up if symptoms persist or worsen or concerns    I explained my diagnostic considerations and recommendations to the patient, who voiced understanding and agreement with the treatment plan. All questions were answered. We discussed potential side effects of any prescribed or recommended therapies, as well as expectations for response to treatments.    Anna Mckeon PA-C  11/28/2022  6:19 PM    HPI:    Gerda Hill is a 31 year old female  who presents to [facility] today for concerns of thrush. Her daughter is being treated, she started to develop symptoms about a week ago. Stabbing, burning, tingling between feeds to both nipples. She reports even the air hurts after nursing/detaching infant. Pain is equal bilaterally. Pain is solely in nipple region (does not extend to  "other region of breast), no nodules or masses. Just nipples, no other region affected. + increased redness. No fevers, chills, no drainage.     Past Medical History:   Diagnosis Date     Anemia due to blood loss, acute 3/30/2019     Past Surgical History:   Procedure Laterality Date     BUNIONECTOMY       SINUS SURGERY       Social History     Tobacco Use     Smoking status: Never     Smokeless tobacco: Never   Substance Use Topics     Alcohol use: No     Current Outpatient Medications   Medication Sig Dispense Refill     Misc. Devices (BREAST PUMP) [facility] Dispense 1 double electric breast pump for expressing breast milk for human infant 1 each 0     Prenatal Vit-Fe Fumarate-FA (PRENATAL MULTIVITAMIN W/IRON) 27-0.8 MG tablet Take 1 tablet by mouth daily 90 tablet 3     Allergies   Allergen Reactions     Quasense Itching     Past medical history, past surgical history, current medications and allergies reviewed and accurate to the best of my knowledge.      ROS:  Refer to HPI    /70   Pulse 75   Temp 98.5  F (36.9  C) (Tympanic)   Resp 20   Ht 1.6 m (5' 3\")   Wt 64.8 kg (142 lb 14.4 oz)   LMP 01/15/2022   SpO2 98%   Breastfeeding Yes   BMI 25.31 kg/m      EXAM:  General Appearance: Well appearing 31 year old female, appropriate appearance for age. No acute distress   Respiratory: normal chest wall and respirations.  Normal effort.  Clear to auscultation bilaterally, no wheezing, crackles or rhonchi.  No increased work of breathing.  No cough appreciated.  Cardiac: RRR with no murmurs  Musculoskeletal:  Equal movement of bilateral upper extremities.  Equal movement of bilateral lower extremities.  Normal gait.    Dermatological: mildly shiny and minimal to no flaking skin on/off nipple bilaterally. No erythema, calor or drainage to nipple bilaterally. Breasts are normal in appearance bilaterally, no masses or abnormal lesions.   Neuro: Alert and oriented to person, place, and time.  Cranial nerves II-XII " grossly intact with no focal or lateralizing deficits.  Muscle tone normal.  Gait normal. No tremor.   Psychological: normal affect, alert, oriented, and pleasant.     Labs:  None     Xray:  None

## 2022-12-09 ENCOUNTER — PRENATAL OFFICE VISIT (OUTPATIENT)
Dept: OBGYN | Facility: OTHER | Age: 32
End: 2022-12-09
Attending: OBSTETRICS & GYNECOLOGY
Payer: COMMERCIAL

## 2022-12-09 VITALS
BODY MASS INDEX: 24.09 KG/M2 | SYSTOLIC BLOOD PRESSURE: 102 MMHG | DIASTOLIC BLOOD PRESSURE: 60 MMHG | HEART RATE: 80 BPM | WEIGHT: 136 LBS

## 2022-12-09 DIAGNOSIS — Z30.430 ENCOUNTER FOR INTRAUTERINE DEVICE PLACEMENT: ICD-10-CM

## 2022-12-09 LAB — HCG UR QL: NEGATIVE

## 2022-12-09 PROCEDURE — 58300 INSERT INTRAUTERINE DEVICE: CPT | Performed by: OBSTETRICS & GYNECOLOGY

## 2022-12-09 PROCEDURE — 99207 PR POST-PARTUM 6 WK VISIT - GICH ONLY: CPT | Performed by: OBSTETRICS & GYNECOLOGY

## 2022-12-09 PROCEDURE — 250N000011 HC RX IP 250 OP 636: Performed by: OBSTETRICS & GYNECOLOGY

## 2022-12-09 PROCEDURE — 81025 URINE PREGNANCY TEST: CPT | Mod: ZL | Performed by: OBSTETRICS & GYNECOLOGY

## 2022-12-09 RX ADMIN — LEVONORGESTREL 20 MCG: 52 INTRAUTERINE DEVICE INTRAUTERINE at 10:57

## 2022-12-09 ASSESSMENT — PAIN SCALES - GENERAL: PAINLEVEL: NO PAIN (0)

## 2022-12-09 NOTE — PROGRESS NOTES
"6 week Postpartum Visit Note    S:  Ms. Gerda Hill is a 31 year old  here for her 6-week postpartum checkup.   - Had a  on 10/27/22.  - Infant gender:  girl, weight 8 pounds 3 oz.  - Feeding Method:  .  Complications reported with feeding:  none, infant thriving .    - Bleeding:  None.  Menses resumed:  No  - Bowel/Urinary problems:  No  - Mood: good  - Sleep: getting better    [place] Depression Scale  Thoughts of Harming Self: Never  Total Score: 0      - Contraception Planned:  Mirena  - She  has had intercourse since delivery and experienced  No discomfort.  .    - Current tobacco use:  No  - Hx of Abuse:  No  ================================================================  ROS: 10 point ROS neg other than the symptoms noted above in the HPI.     O:  /60 (BP Location: Right arm, Patient Position: Sitting, Cuff Size: Adult Regular)   Pulse 80   Wt 61.7 kg (136 lb)   LMP 01/15/2022   BMI 24.09 kg/m    Gen: Well-appearing, NAD  Psych:  Appropriate mood and affect  Breast: Deferred  Abd:  Benign and Soft, flat, non-tender  Pelvic: Well healed perineum. Normal vagina and cervix. Uterus normal size, retroverted, nontender. No adnexal masses or tenderness    IUD Insertion Note:      Time Out - \"Pause for the Cause\"  Just before the procedure begins, through verbal and active participation of team members, verify:    Initials   Patient Name    Gerda Hill    Patient Date of Birth 1990    Procedure to be performed  Mirena IUD insertion     Consent:  Risks, benefits of treatment, and no treatment were discussed.  Patient's questions were elicited and answered.     After informed consent was obtained from the patient, a speculum was placed in the vagina to visualized the cervix.  The cervix was then swabbed with a betadine prep x 3.   Tenaculum was placed at the 12 o'clock position on the cervix and the uterus sounded to 8cm.  The Mirena  IUD was then placed in the usual " fashion under sterile technique.  Strings were clipped about 2 cm from the cervical os.  Tenaculum was removed and cervix was hemostatic.  There were no complications.  The patient tolerated the procedure well.    Lot [id number]  Exp Dec 2024    A/P:  Ms. Gerda Hill is a 31 year old  here for 6 week postpartum visit after . Doing well.  - Contraception: Mirena  - Feeding: breast  - Follow-up: 1 month IUD check    Lizeth Langston MD  OB/GYN  2022 10:30 AM

## 2022-12-09 NOTE — NURSING NOTE
Chief Complaint   Patient presents with     Post Partum Exam     6 week post partum          Medication Reconciliation: complete   Prior to the start of the procedure and with procedural staff participation, I verbally confirmed the patient s identity using two indicators, relevant allergies, that the procedure was appropriate and matched the consent or emergent situation, and that the correct equipment/implants were available. Immediately prior to starting the procedure I conducted the Time Out with the procedural staff and re-confirmed the patient s name, procedure, and site/side. (The Joint Commission universal protocol was followed.)  Yes    Sedation (Moderate or Deep): None      Nicole Mason LPN........................12/9/2022  10:11 AM

## 2022-12-28 ENCOUNTER — MEDICAL CORRESPONDENCE (OUTPATIENT)
Dept: HEALTH INFORMATION MANAGEMENT | Facility: OTHER | Age: 32
End: 2022-12-28

## 2023-01-09 ENCOUNTER — OFFICE VISIT (OUTPATIENT)
Dept: OBGYN | Facility: OTHER | Age: 33
End: 2023-01-09
Payer: COMMERCIAL

## 2023-01-09 VITALS
HEART RATE: 109 BPM | BODY MASS INDEX: 25.15 KG/M2 | DIASTOLIC BLOOD PRESSURE: 64 MMHG | SYSTOLIC BLOOD PRESSURE: 106 MMHG | WEIGHT: 142 LBS | OXYGEN SATURATION: 98 %

## 2023-01-09 DIAGNOSIS — B37.0 THRUSH: Primary | ICD-10-CM

## 2023-01-09 PROCEDURE — 99213 OFFICE O/P EST LOW 20 MIN: CPT

## 2023-01-09 RX ORDER — FLUCONAZOLE 100 MG/1
TABLET ORAL
Qty: 9 TABLET | Refills: 0 | Status: SHIPPED | OUTPATIENT
Start: 2023-01-09 | End: 2024-02-02

## 2023-01-09 ASSESSMENT — PAIN SCALES - GENERAL: PAINLEVEL: NO PAIN (0)

## 2023-01-09 NOTE — NURSING NOTE
Patient presents to clinic for 1 month IUD check, patient states she is also breastfeeding and has thrush, patient states she is on lotrimin and the thrush is not clearing up and currently passing back between baby and herself.   Patient also states since IUD insertion she is spotting more then the last time.   /64   Pulse 109   Wt 64.4 kg (142 lb)   LMP 01/15/2022   SpO2 98%   Breastfeeding Yes   BMI 25.15 kg/m    Nehal Schwartz LPN on 1/9/2023 at 10:35 AM

## 2023-01-09 NOTE — PROGRESS NOTES
Follow-Up Visit    S: Ms. Gerda Hill is a 32 year old  here for IUD check. She had a Mirena placed on 22. She reports some spotting but nothing to concerning. She also has nipple thrush that her and baby are passing back and forth.     O:  /64   Pulse 109   Wt 64.4 kg (142 lb)   LMP 01/15/2022   SpO2 98%   Breastfeeding Yes   BMI 25.15 kg/m    Gen: Well-appearing, NAD  Pulm: nonlabored  Psych: appropriate mood and affect    Pelvic:  Normal appearing external female genitalia. Normal hair distribution. Vagina is without lesions. Small brown discharge. Cervix normal, IUD strings appear appropriate length    A/P:  Ms. Gerda Hill is a 32 year old  here for IUD check. Discussed expected bleeding patterns. She reports her and baby are passing thrush back and forth. She states the topical medication is not working. Sent oral fluconazole per UpToDate for 7 days. If patient does not note improvement will extend to 14 days.   - RTC 1 year for IUD check or sooner prmark RUBIO, CRISTINA-C  2023 10:53 AM

## 2023-03-20 ENCOUNTER — OFFICE VISIT (OUTPATIENT)
Dept: FAMILY MEDICINE | Facility: OTHER | Age: 33
End: 2023-03-20
Attending: NURSE PRACTITIONER
Payer: COMMERCIAL

## 2023-03-20 VITALS
BODY MASS INDEX: 23.8 KG/M2 | SYSTOLIC BLOOD PRESSURE: 106 MMHG | DIASTOLIC BLOOD PRESSURE: 70 MMHG | HEART RATE: 91 BPM | TEMPERATURE: 97.3 F | RESPIRATION RATE: 16 BRPM | HEIGHT: 63 IN | WEIGHT: 134.3 LBS | OXYGEN SATURATION: 97 %

## 2023-03-20 DIAGNOSIS — J01.00 ACUTE NON-RECURRENT MAXILLARY SINUSITIS: Primary | ICD-10-CM

## 2023-03-20 PROCEDURE — 99213 OFFICE O/P EST LOW 20 MIN: CPT | Performed by: NURSE PRACTITIONER

## 2023-03-20 RX ORDER — AMOXICILLIN 500 MG/1
500 CAPSULE ORAL 2 TIMES DAILY
Qty: 20 CAPSULE | Refills: 0 | Status: SHIPPED | OUTPATIENT
Start: 2023-03-20 | End: 2023-03-30

## 2023-03-20 ASSESSMENT — PAIN SCALES - GENERAL: PAINLEVEL: MILD PAIN (3)

## 2023-03-20 NOTE — NURSING NOTE
"Pt presents to  with her daughter. Pt states she has had sinus pain/idscomfort x2 days. Pt has been taking Ibuprofen PRN, last dose 1330 today.    Chief Complaint   Patient presents with     Sinus Problem     X2 days       FOOD SECURITY SCREENING QUESTIONS  Hunger Vital Signs:  Within the past 12 months we worried whether our food would run out before we got money to buy more. Never  Within the past 12 months the food we bought just didn't last and we didn't have money to get more. Never  Sheri Dearmon 3/20/2023 3:16 PM      Initial /70 (BP Location: Right arm, Patient Position: Sitting, Cuff Size: Adult Regular)   Pulse 91   Temp 97.3  F (36.3  C) (Tympanic)   Resp 16   Ht 1.6 m (5' 3\")   Wt 60.9 kg (134 lb 4.8 oz)   SpO2 97%   BMI 23.79 kg/m   Estimated body mass index is 23.79 kg/m  as calculated from the following:    Height as of this encounter: 1.6 m (5' 3\").    Weight as of this encounter: 60.9 kg (134 lb 4.8 oz).  Medication Reconciliation: complete    Sheri Dearmon    "

## 2023-03-20 NOTE — PROGRESS NOTES
ASSESSMENT/PLAN:    I have reviewed the nursing notes.  I have reviewed the findings, diagnosis, plan and need for follow up with the patient.    1. Acute non-recurrent maxillary sinusitis  - amoxicillin (AMOXIL) 500 MG capsule; Take 1 capsule (500 mg) by mouth 2 times daily for 10 days  Dispense: 20 capsule; Refill: 0  Exam is consistent with unilateral maxillary sinusitis, however she does not meet guidelines for treatment based on duration at this time.  She is currently breastfeeding.  I did opt to prescribe amoxicillin only for her to take with the understanding that she should give it several more days to see if her symptoms worsen or do not improve before taking.   Patient declines covid test.  Discussed risk of antibiotic resistance and that majority of sinus infections are because of viral illnesses which is why antibiotics are generally not warranted after only 2 to 3 days of symptoms. She agrees to watch and wait before starting antibiotic.     Follow up if symptoms persist or worsen or concerns    I explained my diagnostic considerations and recommendations to the patient, who voiced understanding and agreement with the treatment plan. All questions were answered. We discussed potential side effects of any prescribed or recommended therapies, as well as expectations for response to treatments.    Chinyere Pierre NP  3/20/2023  3:25 PM    HPI  Gerda Hill is a 32 year old female who presents to [facility] today for concerns of severe sinus pain that started last night. The left side of her face is throbbing, including teeth, jaw, into sinus and under the eye. Lots of discomfort drainage or congestion. No broken teeth or teeth concerns. Dentist said come here first. Has been taking ibuprofen, last dose at 1330. No one else at home is sick. She had sinus surgery on the right that they opened, was years ago. No fevers. No other symptoms. No covid exposure. Cannot even open her eyes when the pain is  "present. Hit her hard.     Declines covid test.   Is currently nursing.     ROS otherwise negative.    Past Medical History:   Diagnosis Date     Anemia due to blood loss, acute 3/30/2019     Past Surgical History:   Procedure Laterality Date     BUNIONECTOMY       SINUS SURGERY       Social History     Tobacco Use     Smoking status: Never     Smokeless tobacco: Never   Substance Use Topics     Alcohol use: No     Current Outpatient Medications   Medication Sig Dispense Refill     amoxicillin (AMOXIL) 500 MG capsule Take 1 capsule (500 mg) by mouth 2 times daily for 10 days 20 capsule 0     levonorgestrel (MIRENA) 20 MCG/DAY IUD 1 each by Intrauterine route once Placed 12/9/22       Prenatal Vit-Fe Fumarate-FA (PRENATAL MULTIVITAMIN W/IRON) 27-0.8 MG tablet Take 1 tablet by mouth daily 90 tablet 3     clotrimazole (LOTRIMIN) 1 % external cream Apply topically 2 times daily (Patient not taking: Reported on 3/20/2023) 30 g 0     fluconazole (DIFLUCAN) 100 MG tablet Take 200mg (2 tabs) for the first day then take 100mg (1 tab) daily for 7 days. (Patient not taking: Reported on 3/20/2023) 9 tablet 0     Misc. Devices (BREAST PUMP) MISC Dispense 1 double electric breast pump for expressing breast milk for human infant (Patient not taking: Reported on 3/20/2023) 1 each 0     Allergies   Allergen Reactions     Quasense Itching     Past medical history, past surgical history, current medications and allergies reviewed and accurate to the best of my knowledge.      ROS:  Refer to HPI    /70 (BP Location: Right arm, Patient Position: Sitting, Cuff Size: Adult Regular)   Pulse 91   Temp 97.3  F (36.3  C) (Tympanic)   Resp 16   Ht 1.6 m (5' 3\")   Wt 60.9 kg (134 lb 4.8 oz)   SpO2 97%   BMI 23.79 kg/m      EXAM:  General Appearance: Well appearing 32 year old female, appropriate appearance for age. No acute distress   Ears: Left TM intact, translucent with bony landmarks appreciated, no erythema, no effusion, no " bulging, no purulence.  Right TM intact, translucent with bony landmarks appreciated, no erythema, no effusion, no bulging, no purulence.  Left auditory canal clear.  Right auditory canal clear.  Normal external ears, non tender.  Eyes: conjunctivae normal without erythema or irritation, corneas clear, no drainage or crusting, no eyelid swelling, pupils equal   Oropharynx: moist mucous membranes, posterior pharynx without erythema, tonsils symmetric, no erythema, no exudates, voice clear.    Sinuses:  + sinus tenderness upon palpation of the left frontal and maxillary sinuses  Nose:  Bilateral nares: + erythema, no edema, + nasal congestion   Neck: supple without adenopathy  Respiratory: normal chest wall and respirations.  Normal effort.  Clear to auscultation bilaterally, no wheezing, crackles or rhonchi.  No increased work of breathing.  No cough appreciated.  Cardiac: RRR with no murmurs  Psychological: normal affect, alert, oriented, and pleasant.

## 2023-03-20 NOTE — PATIENT INSTRUCTIONS
Recommend nasal steroid (flonase).    Amoxicillin ONLY if your symptoms do not start to improve over the next 5-7 days given most sinus infections are viral in nature.  However, prescribed in this instance for severe one-sided symptoms.     Netti pot/saline rinse recommended.     Recommend at home covid test to rule out

## 2023-04-27 ENCOUNTER — MEDICAL CORRESPONDENCE (OUTPATIENT)
Dept: HEALTH INFORMATION MANAGEMENT | Facility: OTHER | Age: 33
End: 2023-04-27
Payer: COMMERCIAL

## 2023-05-07 ENCOUNTER — HEALTH MAINTENANCE LETTER (OUTPATIENT)
Age: 33
End: 2023-05-07

## 2023-10-26 ENCOUNTER — OFFICE VISIT (OUTPATIENT)
Dept: FAMILY MEDICINE | Facility: OTHER | Age: 33
End: 2023-10-26
Attending: NURSE PRACTITIONER
Payer: COMMERCIAL

## 2023-10-26 VITALS
DIASTOLIC BLOOD PRESSURE: 56 MMHG | SYSTOLIC BLOOD PRESSURE: 100 MMHG | BODY MASS INDEX: 23.6 KG/M2 | OXYGEN SATURATION: 98 % | TEMPERATURE: 98.1 F | RESPIRATION RATE: 16 BRPM | HEART RATE: 73 BPM | WEIGHT: 133.2 LBS | HEIGHT: 63 IN

## 2023-10-26 DIAGNOSIS — H61.23 BILATERAL IMPACTED CERUMEN: ICD-10-CM

## 2023-10-26 DIAGNOSIS — H92.01 RIGHT EAR PAIN: Primary | ICD-10-CM

## 2023-10-26 DIAGNOSIS — H91.91 DECREASED HEARING, RIGHT: ICD-10-CM

## 2023-10-26 PROCEDURE — 99213 OFFICE O/P EST LOW 20 MIN: CPT | Performed by: NURSE PRACTITIONER

## 2023-10-26 ASSESSMENT — PAIN SCALES - GENERAL: PAINLEVEL: MODERATE PAIN (4)

## 2023-10-26 NOTE — PROGRESS NOTES
ASSESSMENT/PLAN:     I have reviewed the nursing notes.  I have reviewed the findings, diagnosis, plan and need for follow up with the patient.        1. Right ear pain  2. Decreased hearing, right  3. Bilateral impacted cerumen    Bilateral ears with excessive impacted cerumen.  Ear flush completed by nurse without difficulty or complication.  Bilateral TMs symmetrical without indications of ear infection.      May use over the counter ear pain drops PRN  May use over-the-counter Tylenol or ibuprofen PRN    Discussed warning signs/symptoms indicative of need to follow up including worsening ear pain - if develops worsening ear pain may call for antibiotic to be sent in without needing to be re-seen.          I explained my diagnostic considerations and recommendations to the patient, who voiced understanding and agreement with the treatment plan. All questions were answered. We discussed potential side effects of any prescribed or recommended therapies, as well as expectations for response to treatments.    Cris Mcarthur NP  [facility]      SUBJECTIVE:   Gerda Hill is a 32 year old female who presents to clinic today for the following health issues:  Right ear pain    HPI  She had a cold for the past week which is resolving.  Persisting right ear pain with worsening pain and difficulty hearing out of the right ear.  No fevers.  No ear drainage.    Tried OTC ear pain drops and Ibuprofen.          Past Medical History:   Diagnosis Date    Anemia due to blood loss, acute 3/30/2019     Past Surgical History:   Procedure Laterality Date    BUNIONECTOMY      SINUS SURGERY       Social History     Tobacco Use    Smoking status: Never    Smokeless tobacco: Never   Substance Use Topics    Alcohol use: No     Current Outpatient Medications   Medication Sig Dispense Refill    levonorgestrel (MIRENA) 20 MCG/DAY IUD 1 each by Intrauterine route once Placed 12/9/22      clotrimazole (LOTRIMIN) 1 %  "external cream Apply topically 2 times daily (Patient not taking: Reported on 3/20/2023) 30 g 0    fluconazole (DIFLUCAN) 100 MG tablet Take 200mg (2 tabs) for the first day then take 100mg (1 tab) daily for 7 days. (Patient not taking: Reported on 3/20/2023) 9 tablet 0    Misc. Devices (BREAST PUMP) MISC Dispense 1 double electric breast pump for expressing breast milk for human infant (Patient not taking: Reported on 3/20/2023) 1 each 0    Prenatal Vit-Fe Fumarate-FA (PRENATAL MULTIVITAMIN W/IRON) 27-0.8 MG tablet Take 1 tablet by mouth daily (Patient not taking: Reported on 10/26/2023) 90 tablet 3     Allergies   Allergen Reactions    Levonorgestrel-Ethinyl Estrad Itching         Past medical history, past surgical history, current medications and allergies reviewed and accurate to the best of my knowledge.        OBJECTIVE:     /56   Pulse 73   Temp 98.1  F (36.7  C) (Tympanic)   Resp 16   Ht 1.6 m (5' 3\")   Wt 60.4 kg (133 lb 3.2 oz)   SpO2 98%   Breastfeeding No   BMI 23.60 kg/m    Body mass index is 23.6 kg/m .      Physical Exam  General Appearance: Well appearing adult female, appropriate appearance for age. No acute distress  Ears:  Bilateral TMs initially obscured by impacted excessive cerumen.  Bilateral ear flush completed without difficulty or complication.  Bilateral TMs intact, no erythema, mild serous effusions, no purulence.  Bilateral  auditory canals clear without drainage or bleeding after ear flush.  Normal external ears, non tender.  Nose:  No noted drainage or congestion   Neck: supple without adenopathy  Respiratory: normal chest wall and respirations.  Normal effort. No cough appreciated.  Musculoskeletal:  Equal movement of bilateral upper extremities.  Equal movement of bilateral lower extremities.  Normal gait.    Psychological: normal affect, alert, oriented, and pleasant.           "

## 2023-10-26 NOTE — NURSING NOTE
"Chief Complaint   Patient presents with    Otalgia     Right      Patient is here for right ear pain. She was sick last week. All symptoms have improved except her ear pain.     Initial /56   Pulse 73   Temp 98.1  F (36.7  C) (Tympanic)   Resp 16   Ht 1.6 m (5' 3\")   Wt 60.4 kg (133 lb 3.2 oz)   SpO2 98%   Breastfeeding No   BMI 23.60 kg/m   Estimated body mass index is 23.6 kg/m  as calculated from the following:    Height as of this encounter: 1.6 m (5' 3\").    Weight as of this encounter: 60.4 kg (133 lb 3.2 oz).  Medication Reconciliation: complete    Chinyere Willis CMA      FOOD SECURITY SCREENING QUESTIONS:    The next two questions are to help us understand your food security.  If you are feeling you need any assistance in this area, we have resources available to support you today.    Hunger Vital Signs:  Within the past 12 months we worried whether our food would run out before we got money to buy more. Never  Within the past 12 months the food we bought just didn't last and we didn't have money to get more. Never  Chinyere Willis CMA,LPN on 10/26/2023 at 3:44 PM    "

## 2024-02-01 NOTE — PROGRESS NOTES
"    Assessment & Plan       ICD-10-CM    1. Atypical mole  D22.9 Surgical Pathology Exam          Specimen sent for pathology.  Follow-up will be pending results.  Localized skin care discussed.    PDMP Review       None                       No follow-ups on file.    SERINA CARPIO MD  [facility]    Subjective   Gerda Hill is a 33 year old female  presenting for the following health issues: Nursing Notes:   Ashtyn Ortiz LPN  2/2/2024 11:09 AM  Signed  Chief Complaint   Patient presents with    Derm Problem     Mole removal -  back Left side of head       Initial /68 (BP Location: Right arm, Patient Position: Sitting, Cuff Size: Adult Regular)   Pulse 78   Temp 97.4  F (36.3  C) (Temporal)   Resp 14   Ht 1.6 m (5' 3\")   Wt 60 kg (132 lb 3.2 oz)   SpO2 98%   Breastfeeding No   BMI 23.42 kg/m   Estimated body mass index is 23.42 kg/m  as calculated from the following:    Height as of this encounter: 1.6 m (5' 3\").    Weight as of this encounter: 60 kg (132 lb 3.2 oz).  Medication Review: complete    The next two questions are to help us understand your food security.  If you are feeling you need any assistance in this area, we have resources available to support you today.          1/30/2024   SDOH- Food Insecurity   Within the past 12 months, did you worry that your food would run out before you got money to buy more? N   Within the past 12 months, did the food you bought just not last and you didn t have money to get more? N         Health Care Directive:  Patient does not have a Health Care Directive or Living Will: Discussed advance care planning with patient; information given to patient to review.    Ashtyn Ortiz LPN                                 [facility] Gerda Hill is a 33 year old female presents for mole removal.    This mole is on her scalp.  It is been present for quite some period of time, was recently changed.  No personal history of skin " "cancer.        Current Outpatient Medications   Medication    levonorgestrel (MIRENA) 20 MCG/DAY IUD    Misc. Devices (BREAST PUMP) MISC    clotrimazole (LOTRIMIN) 1 % external cream    fluconazole (DIFLUCAN) 100 MG tablet    Prenatal Vit-Fe Fumarate-FA (PRENATAL MULTIVITAMIN W/IRON) 27-0.8 MG tablet     No current facility-administered medications for this visit.     Past Medical History:   Diagnosis Date    Anemia due to blood loss, acute 3/30/2019               Review of Systems           2018     2:25 PM 2019    12:53 PM 9/15/2022    10:14 AM   PHQ   PHQ-9 Total Score 0 0 0   Q9: Thoughts of better off dead/self-harm past 2 weeks Not at all Not at all Not at all         5/10/2019    11:03 AM 2019    12:53 PM   IVÁN-7 SCORE   Total Score 5 0             Objective  /68 (BP Location: Right arm, Patient Position: Sitting, Cuff Size: Adult Regular)   Pulse 78   Temp 97.4  F (36.3  C) (Temporal)   Resp 14   Ht 1.6 m (5' 3\")   Wt 60 kg (132 lb 3.2 oz)   SpO2 98%   Breastfeeding No   BMI 23.42 kg/m     Physical Exam   GENERAL: alert and no distress  SCALP:  5mm raised flesh colored nodule       After informed consent was obtained. Time out performed/patient name and  as identifiers.  Using Chloroprep for cleansing and 1% Lidocaine with epinephrine for anesthetic, with sterile   technique a shave excision of the lesion was performed  flush with   the surrounding skin.  Hemostasis was obtained with silver nitrate.  Dressing is applied, and wound care instructions   provided.  Be alert for any signs of infection.  The   specimen is labelled and sent to pathology forevaluation.  The   procedure was well tolerated without complications.            "

## 2024-02-02 ENCOUNTER — OFFICE VISIT (OUTPATIENT)
Dept: FAMILY MEDICINE | Facility: OTHER | Age: 34
End: 2024-02-02
Attending: FAMILY MEDICINE
Payer: COMMERCIAL

## 2024-02-02 VITALS
WEIGHT: 132.2 LBS | TEMPERATURE: 97.4 F | RESPIRATION RATE: 14 BRPM | DIASTOLIC BLOOD PRESSURE: 68 MMHG | SYSTOLIC BLOOD PRESSURE: 110 MMHG | HEIGHT: 63 IN | HEART RATE: 78 BPM | BODY MASS INDEX: 23.42 KG/M2 | OXYGEN SATURATION: 98 %

## 2024-02-02 DIAGNOSIS — D22.9 ATYPICAL MOLE: Primary | ICD-10-CM

## 2024-02-02 PROCEDURE — 88305 TISSUE EXAM BY PATHOLOGIST: CPT

## 2024-02-02 PROCEDURE — 11305 SHAVE SKIN LESION 0.5 CM/<: CPT | Performed by: FAMILY MEDICINE

## 2024-02-02 ASSESSMENT — PAIN SCALES - GENERAL: PAINLEVEL: NO PAIN (0)

## 2024-02-02 NOTE — NURSING NOTE
"Chief Complaint   Patient presents with    Derm Problem     Mole removal -  back Left side of head       Initial /68 (BP Location: Right arm, Patient Position: Sitting, Cuff Size: Adult Regular)   Pulse 78   Temp 97.4  F (36.3  C) (Temporal)   Resp 14   Ht 1.6 m (5' 3\")   Wt 60 kg (132 lb 3.2 oz)   SpO2 98%   Breastfeeding No   BMI 23.42 kg/m   Estimated body mass index is 23.42 kg/m  as calculated from the following:    Height as of this encounter: 1.6 m (5' 3\").    Weight as of this encounter: 60 kg (132 lb 3.2 oz).  Medication Review: complete    The next two questions are to help us understand your food security.  If you are feeling you need any assistance in this area, we have resources available to support you today.          1/30/2024   SDOH- Food Insecurity   Within the past 12 months, did you worry that your food would run out before you got money to buy more? N   Within the past 12 months, did the food you bought just not last and you didn t have money to get more? N         Health Care Directive:  Patient does not have a Health Care Directive or Living Will: Discussed advance care planning with patient; information given to patient to review.    Ashtyn Ortiz LPN      "

## 2024-02-05 LAB
PATH REPORT.COMMENTS IMP SPEC: NORMAL
PATH REPORT.FINAL DX SPEC: NORMAL
PHOTO IMAGE: NORMAL

## 2024-07-14 ENCOUNTER — HEALTH MAINTENANCE LETTER (OUTPATIENT)
Age: 34
End: 2024-07-14

## 2024-07-31 ENCOUNTER — OFFICE VISIT (OUTPATIENT)
Dept: INTERNAL MEDICINE | Facility: OTHER | Age: 34
End: 2024-07-31
Attending: INTERNAL MEDICINE
Payer: COMMERCIAL

## 2024-07-31 VITALS
HEIGHT: 63 IN | TEMPERATURE: 98 F | OXYGEN SATURATION: 98 % | HEART RATE: 85 BPM | SYSTOLIC BLOOD PRESSURE: 104 MMHG | RESPIRATION RATE: 20 BRPM | WEIGHT: 134.2 LBS | BODY MASS INDEX: 23.78 KG/M2 | DIASTOLIC BLOOD PRESSURE: 76 MMHG

## 2024-07-31 DIAGNOSIS — L97.521 ULCER OF BOTH FEET, LIMITED TO BREAKDOWN OF SKIN (H): Primary | ICD-10-CM

## 2024-07-31 DIAGNOSIS — L97.511 ULCER OF BOTH FEET, LIMITED TO BREAKDOWN OF SKIN (H): Primary | ICD-10-CM

## 2024-07-31 PROCEDURE — 99212 OFFICE O/P EST SF 10 MIN: CPT | Performed by: INTERNAL MEDICINE

## 2024-07-31 ASSESSMENT — PAIN SCALES - GENERAL: PAINLEVEL: NO PAIN (0)

## 2024-07-31 ASSESSMENT — ENCOUNTER SYMPTOMS: WOUND: 1

## 2024-07-31 NOTE — NURSING NOTE
Patient is here for concerns of blisters on feet, left foot had drainage and pus.     No LMP recorded (lmp unknown). (Menstrual status: IUD).  Medication Reconciliation: complete    Lou Reyes LPN 7/31/2024 8:45 AM       Advance care directive on file? no  Advance care directive provided to patient? no       Lou Reyes LPN

## 2024-07-31 NOTE — PATIENT INSTRUCTIONS
Continue routine wound care.   No signs of cellulitis or skin infection at this time.    Continue warm water soaks, soap and water.  Cover with bacitracin and sterile bandage.  Replace daily and as needed.      Return as needed for follow-up for new / worsening symptoms.    Clinic : [phone]  Appointment line: [phone]

## 2024-07-31 NOTE — PROGRESS NOTES
"Assessment & Plan     ICD-10-CM    1. Ulcer of both feet, limited to breakdown of skin (H) -- x6 total  L97.511     L97.521          Patient presents for skin check/wound check.  Has bilateral foot blisters/ulcers.  She was doing a lot of backcountry walking trying to do some flyfishing.  Her shoes are not meant to hike in but are very comfortable to stand and fish.  Unfortunately she had to walk nearly 1 mile [place].  Ended up with blisters on both feet.  Yesterday the blister and ulcer overlying the first MTP joint medially, was having redness and drainage, purulent drainage yellow drainage and was very inflamed.  She started soaking her feet with Epsom salt, and schedule appointment for evaluation today.    She talked with a family member who is a nurse and was concerned about the redness and drainage and came in for evaluation.    Reassurance provided.  No signs of cellulitis at this time.  Follow-up as needed for new or worsening symptoms.  Home care instructions provided.               Return for follow-up as needed for new or worsening symptoms, Appointments: [phone].      Esteban Serna MD  [facility]    Review of Systems   Skin:  Positive for wound.        Ronda Chandra is a 33 year old, presenting for the following health issues:  Foot Pain (Bilateral )        7/31/2024     8:43 AM   Additional Questions   Roomed by Lou guardado     History of Present Illness       Reason for visit:  Infected blisters  Symptom onset:  1-3 days ago    She eats 2-3 servings of fruits and vegetables daily.She consumes 0 sweetened beverage(s) daily.She exercises with enough effort to increase her heart rate 20 to 29 minutes per day.  She exercises with enough effort to increase her heart rate 3 or less days per week.   She is taking medications regularly.                     Objective    /76   Pulse 85   Temp 98  F (36.7  C) (Tympanic)   Resp 20   Ht 1.6 m (5' 3\")   Wt 60.9 kg (134 lb " 3.2 oz)   LMP  (LMP Unknown)   SpO2 98%   Breastfeeding No   BMI 23.77 kg/m    Body mass index is 23.77 kg/m .  Physical Exam  Constitutional:       Appearance: Normal appearance.   Musculoskeletal:      Right lower leg: No edema.      Left lower leg: No edema.   Skin:     General: Skin is warm and dry.      Findings: No erythema.      Comments: 2 shallow ulcers on bilateral feet / heels and 1st MTP joint shallow ulcer.   6 total - 3 on each feet.   No signs of cellulitis.   Neurological:      Mental Status: She is alert. Mental status is at baseline.                    Signed Electronically by: Esteban Serna MD

## 2024-09-16 ENCOUNTER — OFFICE VISIT (OUTPATIENT)
Dept: OBGYN | Facility: OTHER | Age: 34
End: 2024-09-16
Attending: OBSTETRICS & GYNECOLOGY
Payer: COMMERCIAL

## 2024-09-16 VITALS
SYSTOLIC BLOOD PRESSURE: 118 MMHG | BODY MASS INDEX: 23.38 KG/M2 | DIASTOLIC BLOOD PRESSURE: 70 MMHG | WEIGHT: 132 LBS | HEART RATE: 76 BPM

## 2024-09-16 DIAGNOSIS — Z30.432 ENCOUNTER FOR IUD REMOVAL: Primary | ICD-10-CM

## 2024-09-16 PROCEDURE — 58301 REMOVE INTRAUTERINE DEVICE: CPT | Performed by: OBSTETRICS & GYNECOLOGY

## 2024-09-16 ASSESSMENT — PAIN SCALES - GENERAL: PAINLEVEL: NO PAIN (0)

## 2024-09-16 NOTE — NURSING NOTE
"Chief Complaint   Patient presents with    IUD   Patient is here for iud removal. Patient is does not wish to have any other birth control at this time    Initial /70   Pulse 76   Wt 59.9 kg (132 lb)   LMP  (LMP Unknown)   BMI 23.38 kg/m   Estimated body mass index is 23.38 kg/m  as calculated from the following:    Height as of 7/31/24: 1.6 m (5' 3\").    Weight as of this encounter: 59.9 kg (132 lb).  Medication Reconciliation: complete      Mari Dean, GIRISH    "

## 2024-09-16 NOTE — PROGRESS NOTES
Gerda Hill is a 33 year old P2 who desires a third pregnancy and want her Mirena IUD removed.    Procedure: Pt in dorsal lithotomy. Speculum placed in vagina. IUD strings seen, grasped and IUD removed. No bleeding or complications.   Pt tolerated well    LJ PLASCENCIA MD

## 2024-11-21 ENCOUNTER — VIRTUAL VISIT (OUTPATIENT)
Dept: OBGYN | Facility: OTHER | Age: 34
End: 2024-11-21
Attending: OBSTETRICS & GYNECOLOGY
Payer: COMMERCIAL

## 2024-11-21 DIAGNOSIS — Z34.91 PRENATAL CARE IN FIRST TRIMESTER: Primary | ICD-10-CM

## 2024-11-21 RX ORDER — PRENATAL VIT/IRON FUM/FOLIC AC 27MG-0.8MG
1 TABLET ORAL DAILY
COMMUNITY

## 2024-11-21 RX ORDER — ONDANSETRON 4 MG/1
4 TABLET, FILM COATED ORAL EVERY 6 HOURS PRN
Qty: 30 TABLET | Refills: 0 | Status: SHIPPED | OUTPATIENT
Start: 2024-11-21

## 2024-11-21 ASSESSMENT — PATIENT HEALTH QUESTIONNAIRE - PHQ9
5. POOR APPETITE OR OVEREATING: SEVERAL DAYS
SUM OF ALL RESPONSES TO PHQ QUESTIONS 1-9: 0

## 2024-11-21 ASSESSMENT — ANXIETY QUESTIONNAIRES
1. FEELING NERVOUS, ANXIOUS, OR ON EDGE: NOT AT ALL
GAD7 TOTAL SCORE: 2
6. BECOMING EASILY ANNOYED OR IRRITABLE: SEVERAL DAYS
IF YOU CHECKED OFF ANY PROBLEMS ON THIS QUESTIONNAIRE, HOW DIFFICULT HAVE THESE PROBLEMS MADE IT FOR YOU TO DO YOUR WORK, TAKE CARE OF THINGS AT HOME, OR GET ALONG WITH OTHER PEOPLE: NOT DIFFICULT AT ALL
2. NOT BEING ABLE TO STOP OR CONTROL WORRYING: NOT AT ALL
5. BEING SO RESTLESS THAT IT IS HARD TO SIT STILL: NOT AT ALL
7. FEELING AFRAID AS IF SOMETHING AWFUL MIGHT HAPPEN: NOT AT ALL
3. WORRYING TOO MUCH ABOUT DIFFERENT THINGS: NOT AT ALL
GAD7 TOTAL SCORE: 2

## 2024-11-21 NOTE — CONFIDENTIAL NOTE
HPI:    This is a 33 year old female patient,  who presents today for OB Intake visit. Patient reports positive pregnancy test at home.     Obstetrical history and OB Questionnaire updated to the best of this nurse's ability based on patient report. PHQ-9 depression screening and routine Domestic Abuse screening completed. All immediate questions and concerns answered.    FOOD SECURITY SCREENING QUESTIONS:    The next two questions are to help us understand your food security.  If you are feeling you need any assistance in this area, we have resources available to support you today.    Hunger Vital Signs:  Within the past 12 months we worried whether our food would run out before we got money to buy more. Never  Within the past 12 months the food we bought just didn't last and we didn't have money to get more. Never    Last menstrual period is reported as Patient's last menstrual period was 10/09/2024 (exact date). BARBARA based on LMP is Estimated Date of Delivery: 2025.  Her cycles are regular.  Her last menstrual period was normal.   Since her LMP, she has experienced  nausea, fatigue, urinary urgency, lightheadedness, and urinary frequency.       OBSTETRIC HISTORY:    OB History    Para Term  AB Living   3 2 2 0 0 2   SAB IAB Ectopic Multiple Live Births   0 0 0 0 2      # Outcome Date GA Lbr Cesar/2nd Weight Sex Type Anes PTL Lv   3 Current            2 Term 10/27/22 39w6d 05:05 / 00:15 3.728 kg (8 lb 3.5 oz) F Vag-Spont EPI N EMILIANO      Name: Natty DURÁN      Apgar1: 8  Apgar5: 9   1 Term 19 39w3d 13:00 / 01:18 3.243 kg (7 lb 2.4 oz) F Vag-Spont INT N EMILIANO      Name: ,FEMALE-DEBRA      Apgar1: 9  Apgar5: 9       Age of first pregnancy: 29  Previous OB Provider: DR. JOSE ALFREDO CASTAÑEDA  Previous Delivering Clinic: [facility]  Release of Records: NONE    Current delivery plan: [facility]  Preferred OB Provider: DR. JOSE ALFREDO CASTAÑEDA  Current Primary Care Provider: NONE  Pediatrician:   MASON CARPIO    Additional History:     Have you travelled during the pregnancy?No  Have your sexual partner(s) travelled during the pregnancy?No      HISTORY:   Planned Pregnancy: Yes  Marital Status:   Occupation: [employer]  Living in Household: Spouse and Children    Father of the baby IS involved.   Family and father of baby IS supportive of current pregnancy.  Past Medical History of Father of Baby:No significant medical history    Past History:  Her past medical history   Past Medical History:   Diagnosis Date    Anemia due to blood loss, acute 3/30/2019   .      Her past surgical history:   Past Surgical History:   Procedure Laterality Date    BUNIONECTOMY      SINUS SURGERY         She has a history of  uncomplicated previous pregnancy and delivery    Since her LMP she denies use of alcohol, tobacco and street drugs.    Pap smear history: 3/18/2022- NORMAL    STD/STI history: No STD history    STD/STI symptoms: no noticeable symptoms     Past medical, surgical, social and family history were reviewed and updated in EPIC.    Medications reviewed by this nurse. Current medication list:  Current Outpatient Medications   Medication Sig Dispense Refill    Prenatal Vit-Fe Fumarate-FA (PRENATAL MULTIVITAMIN W/IRON) 27-0.8 MG tablet Take 1 tablet by mouth daily.      levonorgestrel (MIRENA) 20 MCG/DAY IUD 1 each by Intrauterine route once Placed 12/9/22      Mis. Devices (BREAST PUMP) [facility] Dispense 1 double electric breast pump for expressing breast milk for human infant (Patient not taking: Reported on 7/31/2024) 1 each 0     The following medications were recommended to be discontinued due to Pregnancy Category D status: NONE  Patient informed to contact her primary care provider as soon as possible to discuss a safer alternative.    Influenza vaccine: Flu Vaccine GICH OB: Patient would like to discuss with OB provider  COVID vaccine: COVID vaccine status GICH OB: Patient declined     Risk  factors:  Moderate and moderately severe risks (consult with OB/Gyn)  Previous fetal or  demise: No  History of  delivery: No  History of heart disease Class I: No  Severe anemia, unresponsive to iron therapy: No  Pelvic mass or neoplasm: No  Previous : No  Hyper/hypothyroidism: No  History of postpartum hemorrhage requiring transfusion:No  History of Placenta Accreta: No    High Risk (Pregnancy managed by OB/Gyn)  Multiple pregnancy: No  Pre-gestational diabetes: No  Chronic Hypertension: No  Renal Failure: No  Heart disease, class II or greater: No  Rh Isoimmunization: No  Chronic active hepatitis: No  Convulsive disorder, poorly controlled: No  Isoimmune thrombocytopenia: No  Pre-term premature rupture of membranes: No  Lupus or other autoimmune disorder: No  Human Immunodeficiency Virus: No    HCG Qual Urine   Date Value Ref Range Status   05/10/2019 Negative NEG^Negative Final     Comment:     This test is for screening purposes.  Results should be interpreted along with   the clinical picture.  Confirmation testing is available if warranted by   ordering [id number], HCG Quantitative Pregnancy.       hCG Urine Qualitative   Date Value Ref Range Status   2022 Negative Negative Final     Comment:     This test is for screening purposes.  Results should be interpreted along with the clinical picture.  Confirmation testing is available if warranted by ordering [id number], HCG Quantitative Pregnancy.       ASSESSMENT/PLAN:       33 year old , 6w1d of pregnancy with BARBARA of 2025, by Last Menstrual Period        Counseling given:     - Recommended weight gain for pregnancy: 25-35 lbs.   BMI < 18.5  28-40 lbs   18.5 - 24.9 25-35   25 - 29.9 15-25   > 30  < 15       PLAN/PATIENT INSTRUCTIONS:    Normal exercise.  Normal sexual activity.  Prenatal vitamins.  Anticipated weight gain.    follow-up appointment with Dr. JOSE ALFREDO CASTAÑEDA, Lizeth Castañeda MD AND ASHELY DUMONT for pre- care and  take multivitamin or pre- vitamins    Donna Mcclain RN.................................................. 2024 9:05 AM

## 2025-01-31 ENCOUNTER — LAB (OUTPATIENT)
Dept: LAB | Facility: OTHER | Age: 35
End: 2025-01-31
Attending: OBSTETRICS & GYNECOLOGY
Payer: COMMERCIAL

## 2025-01-31 DIAGNOSIS — Z32.01 PREGNANCY TEST POSITIVE: ICD-10-CM

## 2025-01-31 LAB — HCG INTACT+B SERPL-ACNC: 2118 MIU/ML

## 2025-01-31 PROCEDURE — 36415 COLL VENOUS BLD VENIPUNCTURE: CPT | Mod: ZL

## 2025-01-31 PROCEDURE — 84702 CHORIONIC GONADOTROPIN TEST: CPT | Mod: ZL

## 2025-02-03 ENCOUNTER — LAB (OUTPATIENT)
Dept: LAB | Facility: OTHER | Age: 35
End: 2025-02-03
Attending: OBSTETRICS & GYNECOLOGY
Payer: COMMERCIAL

## 2025-02-03 ENCOUNTER — VIRTUAL VISIT (OUTPATIENT)
Dept: OBGYN | Facility: OTHER | Age: 35
End: 2025-02-03
Attending: OBSTETRICS & GYNECOLOGY
Payer: COMMERCIAL

## 2025-02-03 DIAGNOSIS — Z34.91 PRENATAL CARE IN FIRST TRIMESTER: Primary | ICD-10-CM

## 2025-02-03 DIAGNOSIS — Z32.01 PREGNANCY TEST POSITIVE: ICD-10-CM

## 2025-02-03 LAB — HCG INTACT+B SERPL-ACNC: 7633 MIU/ML

## 2025-02-03 PROCEDURE — 84702 CHORIONIC GONADOTROPIN TEST: CPT | Mod: ZL

## 2025-02-03 PROCEDURE — 36415 COLL VENOUS BLD VENIPUNCTURE: CPT | Mod: ZL

## 2025-02-03 ASSESSMENT — PATIENT HEALTH QUESTIONNAIRE - PHQ9
5. POOR APPETITE OR OVEREATING: SEVERAL DAYS
SUM OF ALL RESPONSES TO PHQ QUESTIONS 1-9: 0

## 2025-02-03 ASSESSMENT — ANXIETY QUESTIONNAIRES
IF YOU CHECKED OFF ANY PROBLEMS ON THIS QUESTIONNAIRE, HOW DIFFICULT HAVE THESE PROBLEMS MADE IT FOR YOU TO DO YOUR WORK, TAKE CARE OF THINGS AT HOME, OR GET ALONG WITH OTHER PEOPLE: NOT DIFFICULT AT ALL
2. NOT BEING ABLE TO STOP OR CONTROL WORRYING: NOT AT ALL
GAD7 TOTAL SCORE: 3
6. BECOMING EASILY ANNOYED OR IRRITABLE: SEVERAL DAYS
7. FEELING AFRAID AS IF SOMETHING AWFUL MIGHT HAPPEN: NOT AT ALL
GAD7 TOTAL SCORE: 3
5. BEING SO RESTLESS THAT IT IS HARD TO SIT STILL: NOT AT ALL
3. WORRYING TOO MUCH ABOUT DIFFERENT THINGS: NOT AT ALL
1. FEELING NERVOUS, ANXIOUS, OR ON EDGE: SEVERAL DAYS

## 2025-02-03 NOTE — CONFIDENTIAL NOTE
HPI:    This is a 34 year old female patient,  who presents today for OB Intake visit. Patient reports positive pregnancy test at home.     Obstetrical history and OB Questionnaire updated to the best of this nurse's ability based on patient report. PHQ-9 depression screening and routine Domestic Abuse screening completed. All immediate questions and concerns answered.    FOOD SECURITY SCREENING QUESTIONS:    The next two questions are to help us understand your food security.  If you are feeling you need any assistance in this area, we have resources available to support you today.    Hunger Vital Signs:  Within the past 12 months we worried whether our food would run out before we got money to buy more. Never  Within the past 12 months the food we bought just didn't last and we didn't have money to get more. Never    Last menstrual period is reported as Patient's last menstrual period was 2024 (exact date). BARBARA based on LMP is Estimated Date of Delivery: Oct 4, 2025.  Her cycles are regular.  Her last menstrual period was abnormal.   Since her LMP, she has experienced  nausea, fatigue, and lightheadedness.       OBSTETRIC HISTORY:    OB History    Para Term  AB Living   4 2 2 0 0 2   SAB IAB Ectopic Multiple Live Births   0 0 0 0 2      # Outcome Date GA Lbr Cesar/2nd Weight Sex Type Anes PTL Lv   4 Current            3 Term 10/27/22 39w6d 05:05 / 00:15 3.728 kg (8 lb 3.5 oz) F Vag-Spont EPI N EMILIANO      Name: Natty DURÁN      Apgar1: 8  Apgar5: 9   2 Term 19 39w3d 13:00 / 01:18 3.243 kg (7 lb 2.4 oz) F Vag-Spont INT N EMILIANO      Name: ,FEMALE-DEBRA      Apgar1: 9  Apgar5: 9   1                 Age of first pregnancy: 29  Previous OB Provider: DR. JOSE ALFREDO CASTAÑEDA  Previous Delivering Clinic: [facility]  Release of Records: NONE    Current delivery plan: [facility]  Preferred OB Provider: DR. JOSE ALFREDO CASTAÑEDA  Current Primary Care Provider: NONE  Pediatrician: DR. CUEVAS  BALAJI    Additional History:     Have you travelled during the pregnancy?No  Have your sexual partner(s) travelled during the pregnancy?No      HISTORY:   Planned Pregnancy: Yes  Marital Status:   Occupation: [employer]  Living in Household: Spouse and Children    Father of the baby IS involved.   Family and father of baby IS supportive of current pregnancy.  Past Medical History of Father of Baby:No significant medical history    Past History:  Her past medical history   Past Medical History:   Diagnosis Date    Anemia due to blood loss, acute 3/30/2019   .      Her past surgical history:   Past Surgical History:   Procedure Laterality Date    BUNIONECTOMY      SINUS SURGERY         She has a history of  uncomplicated previous pregnancy and delivery    Since her LMP she denies use of alcohol, tobacco and street drugs.    Pap smear history: 2022- NORMAL    STD/STI history: No STD history    STD/STI symptoms: no noticeable symptoms     Past medical, surgical, social and family history were reviewed and updated in EPIC.    Medications reviewed by this nurse. Current medication list:  Current Outpatient Medications   Medication Sig Dispense Refill    Magnesium Oxide 140 MG CAPS Take 144 capsules by mouth 2 times daily.      Prenatal Vit-Fe Fumarate-FA (PRENATAL MULTIVITAMIN W/IRON) 27-0.8 MG tablet Take 1 tablet by mouth daily.      levonorgestrel (MIRENA) 20 MCG/DAY IUD 1 each by Intrauterine route once Placed 12/9/22      [facility]. Devices (BREAST PUMP) [facility] Dispense 1 double electric breast pump for expressing breast milk for human infant (Patient not taking: Reported on 7/31/2024) 1 each 0    ondansetron (ZOFRAN) 4 MG tablet Take 1 tablet (4 mg) by mouth every 6 hours as needed for nausea. (Patient not taking: Reported on 2/3/2025) 30 tablet 0     The following medications were recommended to be discontinued due to Pregnancy Category D status: NONE  Patient informed to contact her primary care provider as  soon as possible to discuss a safer alternative.    Influenza vaccine: Flu Vaccine GICH OB: Patient declined  COVID vaccine: COVID vaccine status GICH OB: Patient declined     Risk factors:  Moderate and moderately severe risks (consult with OB/Gyn)  Previous fetal or  demise: No  History of  delivery: No  History of heart disease Class I: No  Severe anemia, unresponsive to iron therapy: No  Pelvic mass or neoplasm: No  Previous : No  Hyper/hypothyroidism: No  History of postpartum hemorrhage requiring transfusion:No  History of Placenta Accreta: No    High Risk (Pregnancy managed by OB/Gyn)  Multiple pregnancy: No  Pre-gestational diabetes: No  Chronic Hypertension: No  Renal Failure: No  Heart disease, class II or greater: No  Rh Isoimmunization: No  Chronic active hepatitis: No  Convulsive disorder, poorly controlled: No  Isoimmune thrombocytopenia: No  Pre-term premature rupture of membranes: No  Lupus or other autoimmune disorder: No  Human Immunodeficiency Virus: No    HCG Qual Urine   Date Value Ref Range Status   05/10/2019 Negative NEG^Negative Final     Comment:     This test is for screening purposes.  Results should be interpreted along with   the clinical picture.  Confirmation testing is available if warranted by   ordering [id number], HCG Quantitative Pregnancy.       hCG Urine Qualitative   Date Value Ref Range Status   2022 Negative Negative Final     Comment:     This test is for screening purposes.  Results should be interpreted along with the clinical picture.  Confirmation testing is available if warranted by ordering [id number], HCG Quantitative Pregnancy.       ASSESSMENT/PLAN:     No diagnosis found.    34 year old , 5w2d of pregnancy with BARBARA of 10/4/2025, by Last Menstrual Period    Urine pregnancy test was completed today and results are noted above.    Per standing orders and scope of practice of this nurse, patient will have the following orders placed and  completed prior to initial OB visit with the appropriate provider:    --early ultrasound for dating and viability ordered for 6+ weeks gestation based on LMP    --Quantitative Beta HCG and progesterone monitoring if indicated    Counseling given:     - Recommended weight gain for pregnancy: 25-35 lbs.   BMI < 18.5  28-40 lbs   18.5 - 24.9 25-35   25 - 29.9 15-25   > 30  < 15       PLAN/PATIENT INSTRUCTIONS:    Normal exercise.  Normal sexual activity.  Prenatal vitamins.  Anticipated weight gain.    follow-up appointment with Dr. JOSE ALFREDO CASTAÑEDA for pre-stephanie care and take multivitamin or pre-stephanie vitamins    Donna Mcclain RN.................................................. 2/3/2025 2:17 PM

## 2025-02-07 ENCOUNTER — MYC MEDICAL ADVICE (OUTPATIENT)
Dept: OBGYN | Facility: OTHER | Age: 35
End: 2025-02-07
Payer: COMMERCIAL

## 2025-02-07 DIAGNOSIS — O21.9 NAUSEA AND VOMITING IN PREGNANCY: Primary | ICD-10-CM

## 2025-02-13 RX ORDER — METOCLOPRAMIDE 10 MG/1
10 TABLET ORAL 4 TIMES DAILY PRN
Qty: 56 TABLET | Refills: 0 | Status: SHIPPED | OUTPATIENT
Start: 2025-02-13

## 2025-02-24 ENCOUNTER — HOSPITAL ENCOUNTER (OUTPATIENT)
Dept: ULTRASOUND IMAGING | Facility: OTHER | Age: 35
Discharge: HOME | End: 2025-02-24
Attending: OBSTETRICS & GYNECOLOGY
Payer: COMMERCIAL

## 2025-02-24 ENCOUNTER — PRENATAL OFFICE VISIT (OUTPATIENT)
Dept: OBGYN | Facility: OTHER | Age: 35
End: 2025-02-24
Payer: COMMERCIAL

## 2025-02-24 ENCOUNTER — HOSPITAL ENCOUNTER (OUTPATIENT)
Dept: INFUSION THERAPY | Facility: OTHER | Age: 35
Discharge: HOME | End: 2025-02-24
Attending: OBSTETRICS & GYNECOLOGY
Payer: COMMERCIAL

## 2025-02-24 VITALS
BODY MASS INDEX: 23.39 KG/M2 | DIASTOLIC BLOOD PRESSURE: 54 MMHG | WEIGHT: 132 LBS | HEART RATE: 92 BPM | HEIGHT: 63 IN | SYSTOLIC BLOOD PRESSURE: 96 MMHG

## 2025-02-24 VITALS — DIASTOLIC BLOOD PRESSURE: 58 MMHG | HEART RATE: 86 BPM | SYSTOLIC BLOOD PRESSURE: 98 MMHG

## 2025-02-24 DIAGNOSIS — O21.9 NAUSEA AND VOMITING IN PREGNANCY: ICD-10-CM

## 2025-02-24 DIAGNOSIS — O21.9 NAUSEA AND VOMITING IN PREGNANCY: Primary | ICD-10-CM

## 2025-02-24 DIAGNOSIS — Z34.91 ENCOUNTER FOR PREGNANCY RELATED EXAMINATION IN FIRST TRIMESTER: Primary | ICD-10-CM

## 2025-02-24 DIAGNOSIS — Z34.91 PRENATAL CARE IN FIRST TRIMESTER: ICD-10-CM

## 2025-02-24 DIAGNOSIS — Z34.91 ENCOUNTER FOR PREGNANCY RELATED EXAMINATION IN FIRST TRIMESTER: ICD-10-CM

## 2025-02-24 DIAGNOSIS — R11.10 HYPEREMESIS: ICD-10-CM

## 2025-02-24 LAB
ABO + RH BLD: NORMAL
BASOPHILS # BLD AUTO: 0 10E3/UL (ref 0–0.2)
BASOPHILS NFR BLD AUTO: 0 %
BLD GP AB SCN SERPL QL: NEGATIVE
EOSINOPHIL # BLD AUTO: 0.1 10E3/UL (ref 0–0.7)
EOSINOPHIL NFR BLD AUTO: 1 %
ERYTHROCYTE [DISTWIDTH] IN BLOOD BY AUTOMATED COUNT: 12.3 % (ref 10–15)
HCT VFR BLD AUTO: 39.2 % (ref 35–47)
HGB BLD-MCNC: 14.3 G/DL (ref 11.7–15.7)
IMM GRANULOCYTES # BLD: 0 10E3/UL
IMM GRANULOCYTES NFR BLD: 0 %
LYMPHOCYTES # BLD AUTO: 1.7 10E3/UL (ref 0.8–5.3)
LYMPHOCYTES NFR BLD AUTO: 17 %
MCH RBC QN AUTO: 31.2 PG (ref 26.5–33)
MCHC RBC AUTO-ENTMCNC: 36.5 G/DL (ref 31.5–36.5)
MCV RBC AUTO: 85 FL (ref 78–100)
MONOCYTES # BLD AUTO: 0.7 10E3/UL (ref 0–1.3)
MONOCYTES NFR BLD AUTO: 7 %
NEUTROPHILS # BLD AUTO: 7.5 10E3/UL (ref 1.6–8.3)
NEUTROPHILS NFR BLD AUTO: 75 %
NRBC # BLD AUTO: 0 10E3/UL
NRBC BLD AUTO-RTO: 0 /100
PLATELET # BLD AUTO: 249 10E3/UL (ref 150–450)
RBC # BLD AUTO: 4.59 10E6/UL (ref 3.8–5.2)
SPECIMEN EXP DATE BLD: NORMAL
T4 FREE SERPL-MCNC: 1.46 NG/DL (ref 0.9–1.7)
TSH SERPL DL<=0.005 MIU/L-ACNC: 0.14 UIU/ML (ref 0.3–4.2)
WBC # BLD AUTO: 10.1 10E3/UL (ref 4–11)

## 2025-02-24 PROCEDURE — 86803 HEPATITIS C AB TEST: CPT

## 2025-02-24 PROCEDURE — 258N000003 HC RX IP 258 OP 636

## 2025-02-24 PROCEDURE — 85041 AUTOMATED RBC COUNT: CPT

## 2025-02-24 PROCEDURE — 87340 HEPATITIS B SURFACE AG IA: CPT

## 2025-02-24 PROCEDURE — 36415 COLL VENOUS BLD VENIPUNCTURE: CPT

## 2025-02-24 PROCEDURE — 85004 AUTOMATED DIFF WBC COUNT: CPT

## 2025-02-24 PROCEDURE — 85018 HEMOGLOBIN: CPT

## 2025-02-24 PROCEDURE — 96360 HYDRATION IV INFUSION INIT: CPT

## 2025-02-24 PROCEDURE — 86850 RBC ANTIBODY SCREEN: CPT

## 2025-02-24 PROCEDURE — 86900 BLOOD TYPING SEROLOGIC ABO: CPT

## 2025-02-24 PROCEDURE — 84439 ASSAY OF FREE THYROXINE: CPT | Mod: ZL

## 2025-02-24 PROCEDURE — 86780 TREPONEMA PALLIDUM: CPT

## 2025-02-24 PROCEDURE — 86762 RUBELLA ANTIBODY: CPT

## 2025-02-24 PROCEDURE — 76801 OB US < 14 WKS SINGLE FETUS: CPT

## 2025-02-24 PROCEDURE — 87389 HIV-1 AG W/HIV-1&-2 AB AG IA: CPT

## 2025-02-24 PROCEDURE — 250N000009 HC RX 250

## 2025-02-24 PROCEDURE — 84443 ASSAY THYROID STIM HORMONE: CPT | Mod: ZL

## 2025-02-24 RX ORDER — ONDANSETRON 2 MG/ML
4 INJECTION INTRAMUSCULAR; INTRAVENOUS ONCE
OUTPATIENT
Start: 2025-03-03 | End: 2025-03-03

## 2025-02-24 RX ORDER — METOCLOPRAMIDE 10 MG/1
10 TABLET ORAL 4 TIMES DAILY PRN
Qty: 56 TABLET | Refills: 1 | Status: SHIPPED | OUTPATIENT
Start: 2025-02-24

## 2025-02-24 RX ORDER — ONDANSETRON 2 MG/ML
4 INJECTION INTRAMUSCULAR; INTRAVENOUS ONCE
OUTPATIENT
Start: 2025-02-24 | End: 2025-02-24

## 2025-02-24 RX ADMIN — ASCORBIC ACID, VITAMIN A PALMITATE, CHOLECALCIFEROL, THIAMINE HYDROCHLORIDE, RIBOFLAVIN-5 PHOSPHATE SODIUM, PYRIDOXINE HYDROCHLORIDE, NIACINAMIDE, DEXPANTHENOL, ALPHA-TOCOPHEROL ACETATE, VITAMIN K1, FOLIC ACID, BIOTIN, CYANOCOBALAMIN: 200; 3300; 200; 6; 3.6; 6; 40; 15; 10; 150; 600; 60; 5 INJECTION, SOLUTION INTRAVENOUS at 15:30

## 2025-02-24 ASSESSMENT — PAIN SCALES - GENERAL: PAINLEVEL_OUTOF10: NO PAIN (0)

## 2025-02-24 NOTE — PROGRESS NOTES
"New Obstetrics Visit    HPI: 34 year old  at 8w2d by LMP here today for initial OB visit. Her LMP was 24. This was a planned pregnancy. Hx of significant nausea in other pregnancies. Has been quite nauseous in this pregnancy. Feels really tired and dehydrated as well.     OBHx  OB History    Para Term  AB Living   4 2 2 0 1 2   SAB IAB Ectopic Multiple Live Births   1 0 0 0 2      # Outcome Date GA Lbr Cesar/2nd Weight Sex Type Anes PTL Lv   4 Current            3 SAB 2024 6w0d    SAB      2 Term 10/27/22 39w6d 05:05 / 00:15 3.728 kg (8 lb 3.5 oz) F Vag-Spont EPI N EMILIANO      Name: Natty DURÁN      Apgar1: 8  Apgar5: 9   1 Term 19 39w3d 13:00 / 01:18 3.243 kg (7 lb 2.4 oz) F Vag-Spont INT N EMILIANO      Name: MARILOU DURÁN-DEBRA      Apgar1: 9  Apgar5: 9       GYN History  - Menses: Patient's last menstrual period was 2024 (exact date)..   - Pap Smears:  due    No results found for: \"PAP\"  - Hx STIs/UTIs: none    PMHx:   Past Medical History:   Diagnosis Date    Anemia due to blood loss, acute 3/30/2019      PSHx:   Past Surgical History:   Procedure Laterality Date    BUNIONECTOMY      SINUS SURGERY        Meds:   Current Outpatient Medications   Medication Sig Dispense Refill    Magnesium Oxide 140 MG CAPS Take 144 capsules by mouth 2 times daily.      metoclopramide (REGLAN) 10 MG tablet Take 1 tablet (10 mg) by mouth 4 times daily as needed for nausea. 56 tablet 1    ondansetron (ZOFRAN ODT) 4 MG ODT tab Take 1 tablet (4 mg) by mouth every 6 hours as needed for nausea. 30 tablet 0    Prenatal Vit-Fe Fumarate-FA (PRENATAL MULTIVITAMIN W/IRON) 27-0.8 MG tablet Take 1 tablet by mouth daily.       No current facility-administered medications for this visit.     Facility-Administered Medications Ordered in Other Visits   Medication Dose Route Frequency Provider Last Rate Last Admin    lactated ringers 1,000 mL with Infuvite Adult 10 mL *See DOSE to administer in MAR " "details (order question)   Intravenous Once Mercedes Humphrey APRN CNP        sodium chloride (PF) 0.9% PF flush 3-20 mL  3-20 mL Intracatheter q1 min prn Mercedes Humphrey APRN CNP         Allergies:     Allergies   Allergen Reactions    Levonorgestrel-Ethinyl Estrad Itching       SocHx:   Social History     Tobacco Use    Smoking status: Never     Passive exposure: Never    Smokeless tobacco: Never   Vaping Use    Vaping status: Never Used   Substance Use Topics    Alcohol use: No    Drug use: No         FamHx:   Family History   Problem Relation Age of Onset    Colitis Mother         colitis    Clotting Disorder Mother     No Known Problems Father     Breast Cancer Maternal Grandmother         late 50s    No Known Problems Daughter         b. 2019        ROS: 10-Point ROS negative except as noted in HPI      Physical Exam  BP 96/54 (BP Location: Right arm, Patient Position: Sitting, Cuff Size: Adult Regular)   Pulse 92   Ht 1.6 m (5' 3\")   Wt 59.9 kg (132 lb)   LMP 2024 (Exact Date)   Breastfeeding No   BMI 23.38 kg/m    Body mass index is 23.38 kg/m .  Gen: Well-appearing, NAD  HEENT: Normocephalic, atraumatic  Neck: Thyroid is slight enlarged bilaterally, no appreciable masses palpated. Non-tender  CV:  RRR, no m/r/g auscultated  Pulm: CTAB, no w/r/r auscultated  Abd: Soft, non-tender, non-distended  Ext: No LE edema, extremities warm and well perfused  Pelvic:  Not completed as visit done in infusion    Labs:  Drawn in infusion     Assessment/Plan:  Ms. Gerda Hill is a 34 year old  at 8w2d by Lmp CW 8 week US, here for new OB visit. Pregnancy is complicated by significant nausea. We discussed the below recommendations and add on options as well as included any increased risk based on patient history with the patients choices and subsequent results if any are reflected below.  Specific education given on avoiding ibuprofen during pregnancy, avoiding overheating with hot tubs and saunas, " and avoiding litter box changing. Caffeine intake recommendations as well as foods to avoid due to listeria reviewed. Safe medications to use over the counter is provided today.     1. Significant nausea: Reglan zofran, fluids ordered and Pt sent for these today.   2. PNC: New OB Labs ord'd  3. Genetics: declines  4. Imaging: dating US at 8w2d  5. Immunizations: COVID   6. Previous Delivery Hx:  X2, SAB X1- 8lbs 3.5 oz   7. TSH added for enlarged thyroid     Follow up in 4 weeks- needs urine and chlamydia collected. Pt to infusion prior to exam due to time constraint       RAMIRO Franco CNP

## 2025-02-24 NOTE — NURSING NOTE
Infusion Nursing Note:  Gerda Hill presents today for Infuvite.    Patient seen by provider today: Yes: Mercedes Humphrey NP   present during visit today: Not Applicable.    Note: Due to time constraints at today's appt, only Infuvite bag infused. Pt declines IV Zofran as she just took oral Zofran at around 1330.    Intravenous Access:  Labs drawn without difficulty.  Peripheral IV placed.    Treatment Conditions:  Not Applicable.      Post Infusion Assessment:  Patient tolerated infusion without incident.  Blood return noted pre and post infusion.  Site patent and intact, free from redness, edema or discomfort.  No evidence of extravasations.  Access discontinued per protocol.       Discharge Plan:   Discharge instructions reviewed with: Patient and .  Patient and/or family verbalized understanding of discharge instructions and all questions answered.  Copy of AVS declined. Patient will return 2-28-25 for next appointment.  AVS to patient via [employer].  Patient discharged in stable condition accompanied by: self.  Departure Mode: Ambulatory.      Erin Estevez RN

## 2025-02-24 NOTE — NURSING NOTE
Chief Complaint   Patient presents with    Prenatal Care     OBPX 8w2d        Medication Reconciliation: complete  States having nausea but is better than previous pregnancy.     Nicole Mason LPN........................2/24/2025  2:43 PM

## 2025-02-25 LAB
RUBV IGG SERPL QL IA: 2.02 INDEX
RUBV IGG SERPL QL IA: POSITIVE
T PALLIDUM AB SER QL: NONREACTIVE

## 2025-02-26 LAB
HBV SURFACE AG SERPL QL IA: NONREACTIVE
HCV AB SERPL QL IA: NONREACTIVE
HIV 1+2 AB+HIV1 P24 AG SERPL QL IA: NONREACTIVE

## 2025-03-12 ENCOUNTER — MYC REFILL (OUTPATIENT)
Dept: OBGYN | Facility: OTHER | Age: 35
End: 2025-03-12
Payer: COMMERCIAL

## 2025-03-12 DIAGNOSIS — Z34.91 PRENATAL CARE IN FIRST TRIMESTER: ICD-10-CM

## 2025-03-12 RX ORDER — ONDANSETRON 4 MG/1
4 TABLET, ORALLY DISINTEGRATING ORAL EVERY 6 HOURS PRN
Qty: 30 TABLET | Refills: 0 | Status: SHIPPED | OUTPATIENT
Start: 2025-03-12

## 2025-03-12 NOTE — TELEPHONE ENCOUNTER
[id number] sent Rx request for the following:      Requested Prescriptions   Pending Prescriptions Disp Refills    ondansetron (ZOFRAN ODT) 4 MG ODT tab 30 tablet 0     Sig: Take 1 tablet (4 mg) by mouth every 6 hours as needed for nausea.        Antivertigo/Antiemetic Agents Passed - 3/12/2025  1:20 PM        Passed - Medication is active on med list and the sig matches. RN to manually verify dose and sig if red X/fail.     If the protocol passes (green check), you do not need to verify med dose and sig.    A prescription matches if they are the same clinical intention.    For Example: once daily and every morning are the same.    The protocol can not identify upper and lower case letters as matching and will fail.     For Example: Take 1 tablet (50 mg) by mouth daily     TAKE 1 TABLET (50 MG) BY MOUTH DAILY    For all fails (red x), verify dose and sig.    If the refill does match what is on file, the RN can still proceed to approve the refill request.       If they do not match, route to the appropriate provider.             Passed - Medication indicated for associated diagnosis     The medication is prescribed for one or more of the following conditions:     Motion sickness   Nausea   Vomiting   Vertigo   Chemotherapy-induced nausea and vomiting   Radiation-induced nausea and vomiting,    Nausea and vomiting prophylaxis, migraine          Passed - Recent (12 mo) or future (90 days) visit with authorizing provider's specialty     The patient must have completed an in-person or virtual visit within the past 12 months or has a future visit scheduled within the next 90 days with the authorizing provider s specialty.  Urgent care and e-visits do not qualify as an office visit for this protocol.          Passed - Patient is 18 years of age or older             Last Prescription Date:   2/7/2025  Last Fill Qty/Refills:         30, R-0    Last Office Visit:              2/24/2025 On license of [facility]   Future Office visit:              Next 5 appointments (look out 90 days)      Mar 19, 2025 11:00 AM  Return OB Visit with Lizeth Langston MD  [facility] ([facility]) [address]  [facility] [address]  [phone]             Unable to complete prescription refill per RN Medication Refill Policy    Kaelyn King RN on 3/12/2025 at 1:25 PM

## 2025-03-19 ENCOUNTER — PRENATAL OFFICE VISIT (OUTPATIENT)
Dept: OBGYN | Facility: OTHER | Age: 35
End: 2025-03-19
Attending: OBSTETRICS & GYNECOLOGY
Payer: COMMERCIAL

## 2025-03-19 VITALS
BODY MASS INDEX: 23.74 KG/M2 | WEIGHT: 134 LBS | DIASTOLIC BLOOD PRESSURE: 60 MMHG | SYSTOLIC BLOOD PRESSURE: 110 MMHG | HEART RATE: 89 BPM

## 2025-03-19 DIAGNOSIS — Z34.91 ENCOUNTER FOR PREGNANCY RELATED EXAMINATION IN FIRST TRIMESTER: Primary | ICD-10-CM

## 2025-03-19 LAB
C TRACH DNA SPEC QL PROBE+SIG AMP: NEGATIVE
N GONORRHOEA DNA SPEC QL NAA+PROBE: NEGATIVE
SPECIMEN TYPE: NORMAL
T4 FREE SERPL-MCNC: 1.25 NG/DL (ref 0.9–1.7)
TSH SERPL DL<=0.005 MIU/L-ACNC: 0.56 UIU/ML (ref 0.3–4.2)

## 2025-03-19 PROCEDURE — 36415 COLL VENOUS BLD VENIPUNCTURE: CPT | Mod: ZL | Performed by: OBSTETRICS & GYNECOLOGY

## 2025-03-19 PROCEDURE — 84439 ASSAY OF FREE THYROXINE: CPT | Mod: ZL | Performed by: OBSTETRICS & GYNECOLOGY

## 2025-03-19 PROCEDURE — 87591 N.GONORRHOEAE DNA AMP PROB: CPT | Mod: ZL | Performed by: OBSTETRICS & GYNECOLOGY

## 2025-03-19 PROCEDURE — 87491 CHLMYD TRACH DNA AMP PROBE: CPT | Mod: ZL | Performed by: OBSTETRICS & GYNECOLOGY

## 2025-03-19 PROCEDURE — 84443 ASSAY THYROID STIM HORMONE: CPT | Mod: ZL | Performed by: OBSTETRICS & GYNECOLOGY

## 2025-03-19 RX ORDER — PROMETHAZINE HYDROCHLORIDE 25 MG/1
25 TABLET ORAL EVERY 6 HOURS PRN
Qty: 30 TABLET | Refills: 1 | Status: SHIPPED | OUTPATIENT
Start: 2025-03-19

## 2025-03-19 ASSESSMENT — PAIN SCALES - GENERAL: PAINLEVEL_OUTOF10: NO PAIN (0)

## 2025-03-19 NOTE — PROGRESS NOTES
Return OB Visit    S: Corazon is still feeling poorly- nauseated constantly and vomiting about every other day. Overall better than her prior pregnancies. No cramping or VB.     O: /60   Pulse 89   Wt 60.8 kg (134 lb)   LMP 2024 (Exact Date)   BMI 23.74 kg/m    Gen: Well-appearing, NAD  See OB Flowsheet    A/P:  Gerda Hill is a 34 year old  at 11w4d by LMP c/w 8w2d US, here for return OB visit.  Enlarged thyroid: TSH 0.14, T4 1.46 at new OB. Repeat today.  If still abnormal, needs thyroid ultrasound    Nausea, vomiting: reglan, zofran, IV infusions prn. Will add phenergan    PNC: Rh positive, Rubella immune  Genetics: declines  Imaging: dating US at 8w2d  Immunizations: none  RTC 4 weeks    Lizeth Langston MD FACOG  OB/GYN  3/19/2025 11:12 AM    
No.

## 2025-03-19 NOTE — NURSING NOTE
"Chief Complaint   Patient presents with    Prenatal Care     11 w 4d   Pt presents to clinic today for prenatal care 11w 4d. Pt is having migraine and nausea is getting better.     Initial /60   Pulse 89   Wt 60.8 kg (134 lb)   LMP 12/28/2024 (Exact Date)   BMI 23.74 kg/m   Estimated body mass index is 23.74 kg/m  as calculated from the following:    Height as of 2/24/25: 1.6 m (5' 3\").    Weight as of this encounter: 60.8 kg (134 lb).  Medication Reconciliation: complete      Mari Dean, GIRISH    "

## 2025-03-25 ENCOUNTER — MYC MEDICAL ADVICE (OUTPATIENT)
Dept: OBGYN | Facility: OTHER | Age: 35
End: 2025-03-25
Payer: COMMERCIAL

## 2025-03-25 NOTE — TELEPHONE ENCOUNTER
The free T4 is the active circulating hormone and it is in the middle of the normal range, which is good. The phenergan may be causing more drowsiness during the day- does not seem to be worse since starting it? She could try not taking it at night and see if it makes a difference.    Lizeth Langston MD FACOG  OB/GYN  3/25/2025 1:52 PM

## 2025-03-31 ENCOUNTER — MYC REFILL (OUTPATIENT)
Dept: OBGYN | Facility: OTHER | Age: 35
End: 2025-03-31
Payer: COMMERCIAL

## 2025-03-31 DIAGNOSIS — Z34.91 PRENATAL CARE IN FIRST TRIMESTER: ICD-10-CM

## 2025-03-31 DIAGNOSIS — O21.9 NAUSEA AND VOMITING IN PREGNANCY: ICD-10-CM

## 2025-03-31 RX ORDER — METOCLOPRAMIDE 10 MG/1
10 TABLET ORAL 4 TIMES DAILY PRN
Qty: 56 TABLET | Refills: 1 | Status: SHIPPED | OUTPATIENT
Start: 2025-03-31

## 2025-03-31 RX ORDER — ONDANSETRON 4 MG/1
4 TABLET, ORALLY DISINTEGRATING ORAL EVERY 6 HOURS PRN
Qty: 30 TABLET | Refills: 1 | Status: SHIPPED | OUTPATIENT
Start: 2025-03-31

## 2025-03-31 NOTE — TELEPHONE ENCOUNTER
[id number] sent Rx request for the following:      Requested Prescriptions   Pending Prescriptions Disp Refills    metoclopramide (REGLAN) 10 MG tablet 56 tablet 1     Sig: Take 1 tablet (10 mg) by mouth 4 times daily as needed for nausea.        Antivertigo/Antiemetic Agents Passed - 3/31/2025  9:00 AM        Passed - Medication is active on med list and the sig matches. RN to manually verify dose and sig if red X/fail.     If the protocol passes (green check), you do not need to verify med dose and sig.    A prescription matches if they are the same clinical intention.    For Example: once daily and every morning are the same.    The protocol can not identify upper and lower case letters as matching and will fail.     For Example: Take 1 tablet (50 mg) by mouth daily     TAKE 1 TABLET (50 MG) BY MOUTH DAILY    For all fails (red x), verify dose and sig.    If the refill does match what is on file, the RN can still proceed to approve the refill request.       If they do not match, route to the appropriate provider.             Passed - Medication indicated for associated diagnosis     The medication is prescribed for one or more of the following conditions:     Motion sickness   Nausea   Vomiting   Vertigo   Chemotherapy-induced nausea and vomiting   Radiation-induced nausea and vomiting,    Nausea and vomiting prophylaxis, migraine          Passed - Recent (12 mo) or future (90 days) visit with authorizing provider's specialty     The patient must have completed an in-person or virtual visit within the past 12 months or has a future visit scheduled within the next 90 days with the authorizing provider s specialty.  Urgent care and e-visits do not qualify as an office visit for this protocol.          Passed - Patient is 18 years of age or older             Last Prescription Date:   2/24/2025  Last Fill Qty/Refills:         56, R-1    Last Office Visit:              3/19/2025 ROXANN   Future Office visit:              Next 5 appointments (look out 90 days)      Apr 16, 2025 8:45 AM  Return OB Visit with RAMIRO Franco [facility] ([facility]) [address]  [place] [address]  [phone]             Prescription refilled per RN Medication Refill Policy.................... Kaelyn King RN ....................  3/31/2025   9:01 AM

## 2025-03-31 NOTE — TELEPHONE ENCOUNTER
[id number] sent Rx request for the following:      Requested Prescriptions   Pending Prescriptions Disp Refills    ondansetron (ZOFRAN ODT) 4 MG ODT tab 30 tablet 0     Sig: Take 1 tablet (4 mg) by mouth every 6 hours as needed for nausea.        Antivertigo/Antiemetic Agents Passed - 3/31/2025  8:58 AM        Passed - Medication is active on med list and the sig matches. RN to manually verify dose and sig if red X/fail.     If the protocol passes (green check), you do not need to verify med dose and sig.    A prescription matches if they are the same clinical intention.    For Example: once daily and every morning are the same.    The protocol can not identify upper and lower case letters as matching and will fail.     For Example: Take 1 tablet (50 mg) by mouth daily     TAKE 1 TABLET (50 MG) BY MOUTH DAILY    For all fails (red x), verify dose and sig.    If the refill does match what is on file, the RN can still proceed to approve the refill request.       If they do not match, route to the appropriate provider.             Passed - Medication indicated for associated diagnosis     The medication is prescribed for one or more of the following conditions:     Motion sickness   Nausea   Vomiting   Vertigo   Chemotherapy-induced nausea and vomiting   Radiation-induced nausea and vomiting,    Nausea and vomiting prophylaxis, migraine          Passed - Recent (12 mo) or future (90 days) visit with authorizing provider's specialty     The patient must have completed an in-person or virtual visit within the past 12 months or has a future visit scheduled within the next 90 days with the authorizing provider s specialty.  Urgent care and e-visits do not qualify as an office visit for this protocol.          Passed - Patient is 18 years of age or older             Last Prescription Date:   3/12/2025  Last Fill Qty/Refills:         30, R-0    Last Office Visit:              3/19/2025   Future Office visit:              Next 5 appointments (look out 90 days)      Apr 16, 2025 8:45 AM  Return OB Visit with RAMIRO Franco [facility] ([facility]) [address]  [place] [address]  [phone]           Prescription refilled per RN Medication Refill Policy.................... Kaelyn King RN ....................  3/31/2025   8:59 AM

## 2025-04-16 ENCOUNTER — PRENATAL OFFICE VISIT (OUTPATIENT)
Dept: OBGYN | Facility: OTHER | Age: 35
End: 2025-04-16
Payer: COMMERCIAL

## 2025-04-16 VITALS
SYSTOLIC BLOOD PRESSURE: 118 MMHG | WEIGHT: 137 LBS | BODY MASS INDEX: 24.27 KG/M2 | DIASTOLIC BLOOD PRESSURE: 70 MMHG | HEART RATE: 72 BPM

## 2025-04-16 DIAGNOSIS — O21.9 NAUSEA AND VOMITING IN PREGNANCY: ICD-10-CM

## 2025-04-16 DIAGNOSIS — Z34.92 ENCOUNTER FOR PREGNANCY RELATED EXAMINATION IN SECOND TRIMESTER: Primary | ICD-10-CM

## 2025-04-16 RX ORDER — ONDANSETRON 4 MG/1
4 TABLET, ORALLY DISINTEGRATING ORAL EVERY 6 HOURS PRN
Qty: 30 TABLET | Refills: 4 | Status: SHIPPED | OUTPATIENT
Start: 2025-04-16

## 2025-04-16 NOTE — PROGRESS NOTES
OB Visit    S: Patient is feeling okay continues with nausea and vomiting but slightly improved. Denies LOF, VB, or regular Ctx. + FM.    Concerns: needs zofran refill     O: /70   Pulse 72   Wt 62.1 kg (137 lb)   LMP 2024 (Exact Date)   BMI 24.27 kg/m    Gen: Well-appearing, NAD  FHT: 146      A/P:  Gerda Hill is a 34 year old  at 15w4d by LMP CW 8w2d US, here for return OB visit.  Enlarged thyroid: TSH 0.14, T4 1.46 at new OB. Repeat normalized.   Nausea, vomiting: reglan, zofran- refilled today, IV infusions prn. Will add phenergan   Heartburn: Pepcid   Hemorrhoids: given OTC meds     PNC: We discussed the below recommendations for planning, testing, and add on options as well as detailed any increased risk based on patient history. The subsequent choices and results if any are reflected below.   Prenatal labs WNL, Rh +, Rubella immune, GCT TBD, 3rd Trimester HGB TBD, GBS TBD   Rhogam: NA  Genetics: declines   Imaging: dating at 8w2d, anatomy ordered   Immunizations: TDAP TBD, RSV TBD  Delivery Plan: TBD   BC after delivery: TBD   Delivery Hx: Hx:  X2, SAB X1, Largest- 8lbs 3.5 oz      RTC 4 weeks       Mercedes RUBIO, FNP-C  2025 8:50 AM

## 2025-04-16 NOTE — NURSING NOTE
"Chief Complaint   Patient presents with    Prenatal Care     15 w 4 d     Pt presents to clinic today for prenatal care 15w 4d. Pt is still having nausea and vomiting intermittent.    Initial /70   Pulse 72   Wt 62.1 kg (137 lb)   LMP 12/28/2024 (Exact Date)   BMI 24.27 kg/m   Estimated body mass index is 24.27 kg/m  as calculated from the following:    Height as of 2/24/25: 1.6 m (5' 3\").    Weight as of this encounter: 62.1 kg (137 lb).  Medication Reconciliation: complete        Mair Dean LPN    "

## 2025-04-30 ENCOUNTER — MYC REFILL (OUTPATIENT)
Dept: OBGYN | Facility: OTHER | Age: 35
End: 2025-04-30
Payer: COMMERCIAL

## 2025-04-30 DIAGNOSIS — O21.9 NAUSEA AND VOMITING IN PREGNANCY: ICD-10-CM

## 2025-04-30 RX ORDER — ONDANSETRON 4 MG/1
4 TABLET, ORALLY DISINTEGRATING ORAL EVERY 6 HOURS PRN
Qty: 30 TABLET | Refills: 4 | OUTPATIENT
Start: 2025-04-30

## 2025-04-30 NOTE — TELEPHONE ENCOUNTER
[facility] the following:      Requested Prescriptions   Pending Prescriptions Disp Refills    ondansetron (ZOFRAN ODT) 4 MG ODT tab 30 tablet 4     Sig: Take 1 tablet (4 mg) by mouth every 6 hours as needed for nausea.        Antivertigo/Antiemetic Agents Passed - 4/30/2025  1:10 PM        Passed - Medication is active on med list and the sig matches. RN to manually verify dose and sig if red X/fail.     If the protocol passes (green check), you do not need to verify med dose and sig.    A prescription matches if they are the same clinical intention.    For Example: once daily and every morning are the same.    The protocol can not identify upper and lower case letters as matching and will fail.     For Example: Take 1 tablet (50 mg) by mouth daily     TAKE 1 TABLET (50 MG) BY MOUTH DAILY    For all fails (red x), verify dose and sig.    If the refill does match what is on file, the RN can still proceed to approve the refill request.       If they do not match, route to the appropriate provider.             Passed - Medication indicated for associated diagnosis     The medication is prescribed for one or more of the following conditions:     Motion sickness   Nausea   Vomiting   Vertigo   Chemotherapy-induced nausea and vomiting   Radiation-induced nausea and vomiting,    Nausea and vomiting prophylaxis, migraine          Passed - Recent (12 mo) or future (90 days) visit with authorizing provider's specialty     The patient must have completed an in-person or virtual visit within the past 12 months or has a future visit scheduled within the next 90 days with the authorizing provider s specialty.  Urgent care and e-visits do not qualify as an office visit for this protocol.          Passed - Patient is 18 years of age or older             Last Prescription Date:   4/16/2025  Last Fill Qty/Refills:         30, R-4    Last Office Visit:              4/16/2025 SAÚL   Future Office visit:              Next 5 appointments (look out 90 days)      May 14, 2025 11:00 AM  Return OB Visit with Lizeth Langston MD  [facility] ([facility]) [address]  [place] [address]  [phone]     Jun 11, 2025 8:45 AM  Return OB Visit with RAMIRO Franco CNP  [facility] ([facility]) [address]  [place] [address]  [phone]     Jul 09, 2025 8:45 AM  Return OB Visit with RAMIRO Franco CNP  [facility] ([facility]) [address]  [place] [address]  [phone]               Prescription refused.  This is a duplicate request.  Kaelyn King RN.......4/30/2025 1:11 PM

## 2025-05-14 ENCOUNTER — PRENATAL OFFICE VISIT (OUTPATIENT)
Dept: OBGYN | Facility: OTHER | Age: 35
End: 2025-05-14
Payer: COMMERCIAL

## 2025-05-14 VITALS
DIASTOLIC BLOOD PRESSURE: 60 MMHG | BODY MASS INDEX: 24.45 KG/M2 | SYSTOLIC BLOOD PRESSURE: 108 MMHG | HEART RATE: 80 BPM | WEIGHT: 138 LBS

## 2025-05-14 DIAGNOSIS — Z34.92 ENCOUNTER FOR PREGNANCY RELATED EXAMINATION IN SECOND TRIMESTER: Primary | ICD-10-CM

## 2025-05-14 ASSESSMENT — PAIN SCALES - GENERAL: PAINLEVEL_OUTOF10: NO PAIN (0)

## 2025-05-14 NOTE — PROGRESS NOTES
Return OB Visit    S: Corazon is feeling well. Still having heartburn but not taking pepcid consistently. Gets random nausea. No ctx, VB or LOF. +FM    O: /60 (BP Location: Right arm, Patient Position: Sitting, Cuff Size: Adult Regular)   Pulse 80   Wt 62.6 kg (138 lb)   LMP 2024 (Exact Date)   BMI 24.45 kg/m    Gen: Well-appearing, NAD  See OB Flowsheet    A/P:  Gerda Hill is a 34 year old  at 19w4d by LMP c/w 8w2d US, here for return OB visit.  Enlarged thyroid: Normal TSH/T4 on 3/19/25  GERD: pepcid  Nausea: reglan, zofran prn    PNC: Rh positive, Rubella immune  Genetics: declines  Imaging: dating US at 8w2d. Anatomy survey scheduled next week  Immunizations: none  RTC 4 weeks    Lizeth Langston MD FACOG  OB/GYN  2025 11:01 AM

## 2025-05-14 NOTE — NURSING NOTE
Chief Complaint   Patient presents with    Prenatal Care     19w4d       Medication Reconciliation: complete    Pt presents to clinic today for prenatal care feels fetal movement and offers no concerns.  Nicole Mason LPN........................5/14/2025  10:59 AM

## 2025-05-15 ENCOUNTER — TELEPHONE (OUTPATIENT)
Dept: OBGYN | Facility: OTHER | Age: 35
End: 2025-05-15
Payer: COMMERCIAL

## 2025-05-15 NOTE — TELEPHONE ENCOUNTER
After verifying pt last name and date of birth, pt states that she started having symptoms suddenly last night. Pt was asked if she thinks this may be related to a viral illness, pt states she does not know. Pt denies fever. Pt states she thinks it might be food poisoning. Pt was asked if she ate anything raw or bad yesterday. Pt denies. Pt was given information regarding medication and ED visit if dehydration occurs. Pt was encouraged to keep us updated.     Donna Mcclain RN on 5/15/2025 at 8:05 AM

## 2025-05-15 NOTE — TELEPHONE ENCOUNTER
Patient is 20 weeks pregnant with vomiting and diarrhea. Would like to know how she should proceed./      Mirian Thacker on 5/15/2025 at 7:09 AM

## 2025-05-22 ENCOUNTER — RESULTS FOLLOW-UP (OUTPATIENT)
Dept: OBGYN | Facility: OTHER | Age: 35
End: 2025-05-22

## 2025-05-22 ENCOUNTER — HOSPITAL ENCOUNTER (OUTPATIENT)
Dept: ULTRASOUND IMAGING | Facility: OTHER | Age: 35
End: 2025-05-22
Payer: COMMERCIAL

## 2025-05-22 DIAGNOSIS — Z34.92 ENCOUNTER FOR PREGNANCY RELATED EXAMINATION IN SECOND TRIMESTER: ICD-10-CM

## 2025-05-22 PROCEDURE — 76805 OB US >/= 14 WKS SNGL FETUS: CPT

## 2025-06-11 ENCOUNTER — PRENATAL OFFICE VISIT (OUTPATIENT)
Dept: OBGYN | Facility: OTHER | Age: 35
End: 2025-06-11
Payer: COMMERCIAL

## 2025-06-11 VITALS
BODY MASS INDEX: 25.63 KG/M2 | SYSTOLIC BLOOD PRESSURE: 108 MMHG | DIASTOLIC BLOOD PRESSURE: 66 MMHG | HEART RATE: 85 BPM | WEIGHT: 144.7 LBS

## 2025-06-11 DIAGNOSIS — Z34.92 ENCOUNTER FOR PREGNANCY RELATED EXAMINATION IN SECOND TRIMESTER: Primary | ICD-10-CM

## 2025-06-11 ASSESSMENT — PAIN SCALES - GENERAL: PAINLEVEL_OUTOF10: NO PAIN (0)

## 2025-06-11 NOTE — PROGRESS NOTES
OB Visit    S: Patient is feeling well- still some nausea requiring zofran. Denies LOF, VB, or regular Ctx. + FM.    Concerns: none    O: /66   Pulse 85   Wt 65.6 kg (144 lb 11.2 oz)   LMP 2024 (Exact Date)   BMI 25.63 kg/m    Gen: Well-appearing, NAD  FH: 24  FHT: 146      A/P:  Gerda Hill is a 34 year old  at 23w4d by LMP c/w 8w2d US, here for return OB visit.  Enlarged thyroid: Normal TSH/T4 on 3/19/25  GERD: pepcid  Nausea: reglan, zofran prn     PNC: Rh positive, Rubella immune  Genetics: declines  Imaging: dating US at 8w2d. Anatomy survey  WNL 49%ile   Immunizations: none  RTC 4 weeks      Mercedes RUBIO, JOHNP-C  2025 8:38 AM

## 2025-06-11 NOTE — PROGRESS NOTES
Pt presents to clinic today for prenatal care 23w4d. Pt denies any bleeding, or leakage of fluid at this time. States baby is moving good. Patient denies contractions.     John Min LPN...........................6/11/2025 8:36 AM

## 2025-07-09 ENCOUNTER — PRENATAL OFFICE VISIT (OUTPATIENT)
Dept: OBGYN | Facility: OTHER | Age: 35
End: 2025-07-09
Payer: COMMERCIAL

## 2025-07-09 VITALS
SYSTOLIC BLOOD PRESSURE: 122 MMHG | HEART RATE: 89 BPM | BODY MASS INDEX: 26.39 KG/M2 | DIASTOLIC BLOOD PRESSURE: 70 MMHG | WEIGHT: 149 LBS

## 2025-07-09 DIAGNOSIS — Z34.92 ENCOUNTER FOR PREGNANCY RELATED EXAMINATION IN SECOND TRIMESTER: Primary | ICD-10-CM

## 2025-07-09 LAB
BASOPHILS # BLD AUTO: 0 10E3/UL (ref 0–0.2)
BASOPHILS NFR BLD AUTO: 0 %
EOSINOPHIL # BLD AUTO: 0.1 10E3/UL (ref 0–0.7)
EOSINOPHIL NFR BLD AUTO: 1 %
ERYTHROCYTE [DISTWIDTH] IN BLOOD BY AUTOMATED COUNT: 12.3 % (ref 10–15)
GLUCOSE 1H P 50 G GLC PO SERPL-MCNC: 192 MG/DL (ref 70–129)
HCT VFR BLD AUTO: 35.3 % (ref 35–47)
HGB BLD-MCNC: 12 G/DL (ref 11.7–15.7)
IMM GRANULOCYTES # BLD: 0 10E3/UL
IMM GRANULOCYTES NFR BLD: 0 %
LYMPHOCYTES # BLD AUTO: 1.2 10E3/UL (ref 0.8–5.3)
LYMPHOCYTES NFR BLD AUTO: 15 %
MCH RBC QN AUTO: 31.1 PG (ref 26.5–33)
MCHC RBC AUTO-ENTMCNC: 34 G/DL (ref 31.5–36.5)
MCV RBC AUTO: 92 FL (ref 78–100)
MONOCYTES # BLD AUTO: 0.5 10E3/UL (ref 0–1.3)
MONOCYTES NFR BLD AUTO: 5 %
NEUTROPHILS # BLD AUTO: 6.7 10E3/UL (ref 1.6–8.3)
NEUTROPHILS NFR BLD AUTO: 78 %
NRBC # BLD AUTO: 0 10E3/UL
NRBC BLD AUTO-RTO: 0 /100
PLATELET # BLD AUTO: 212 10E3/UL (ref 150–450)
RBC # BLD AUTO: 3.86 10E6/UL (ref 3.8–5.2)
T PALLIDUM AB SER QL: NONREACTIVE
TSH SERPL DL<=0.005 MIU/L-ACNC: 1.13 UIU/ML (ref 0.3–4.2)
WBC # BLD AUTO: 8.5 10E3/UL (ref 4–11)

## 2025-07-09 PROCEDURE — 82950 GLUCOSE TEST: CPT | Mod: ZL

## 2025-07-09 PROCEDURE — 86780 TREPONEMA PALLIDUM: CPT | Mod: ZL

## 2025-07-09 PROCEDURE — 36415 COLL VENOUS BLD VENIPUNCTURE: CPT | Mod: ZL

## 2025-07-09 PROCEDURE — 85014 HEMATOCRIT: CPT | Mod: ZL

## 2025-07-09 PROCEDURE — 84443 ASSAY THYROID STIM HORMONE: CPT | Mod: ZL

## 2025-07-09 ASSESSMENT — PAIN SCALES - GENERAL: PAINLEVEL_OUTOF10: NO PAIN (0)

## 2025-07-09 NOTE — PROGRESS NOTES
OB Visit    S: Patient is feeling well. Denies LOF, VB, or regular Ctx. + FM.    Concerns: none    O: /70   Pulse 89   Wt 67.6 kg (149 lb)   LMP 2024 (Exact Date)   BMI 26.39 kg/m    Gen: Well-appearing, NAD  FH: 27  FHT: 144      A/P:  Gerda Hill is a 34 year old  at 27w4d by LMP c/w 8w2d US, here for return OB visit.  Enlarged thyroid: Normal TSH/T4 on 3/19/25  GERD: pepcid  Nausea: reglan, zofran prn     PNC: Rh positive, Rubella immune  Genetics: declines  Imaging: dating US at 8w2d. Anatomy survey  WNL 49%ile   Immunizations: TDaP next visit per preference.     RTC 2 weeks      Mercedes RUBIO, JOHNP-C  2025 8:36 AM

## 2025-07-09 NOTE — NURSING NOTE
"Chief Complaint   Patient presents with    Prenatal Care     27 w 4d     Pt presents to clinic today for prenatal care 27w 4d. Pt is doing well with no concerns.    Initial /70   Pulse 89   Wt 67.6 kg (149 lb)   LMP 12/28/2024 (Exact Date)   BMI 26.39 kg/m   Estimated body mass index is 26.39 kg/m  as calculated from the following:    Height as of 2/24/25: 1.6 m (5' 3\").    Weight as of this encounter: 67.6 kg (149 lb).  Medication Reconciliation: complete        Mari Dean LPN    "

## 2025-07-19 ENCOUNTER — HEALTH MAINTENANCE LETTER (OUTPATIENT)
Age: 35
End: 2025-07-19

## 2025-07-24 ENCOUNTER — LAB (OUTPATIENT)
Dept: LAB | Facility: OTHER | Age: 35
End: 2025-07-24
Attending: OBSTETRICS & GYNECOLOGY
Payer: COMMERCIAL

## 2025-07-24 DIAGNOSIS — Z34.92 ENCOUNTER FOR PREGNANCY RELATED EXAMINATION IN SECOND TRIMESTER: ICD-10-CM

## 2025-07-24 LAB
GESTATIONAL GTT 1 HR POST DOSE: 213 MG/DL (ref 60–179)
GESTATIONAL GTT 2 HR POST DOSE: 189 MG/DL (ref 60–154)
GESTATIONAL GTT 3 HR POST DOSE: 175 MG/DL (ref 60–139)
GLUCOSE P FAST SERPL-MCNC: 96 MG/DL (ref 60–94)

## 2025-07-24 PROCEDURE — 82947 ASSAY GLUCOSE BLOOD QUANT: CPT | Mod: ZL

## 2025-07-24 PROCEDURE — 36415 COLL VENOUS BLD VENIPUNCTURE: CPT | Mod: ZL

## 2025-07-24 PROCEDURE — 82952 GTT-ADDED SAMPLES: CPT | Mod: ZL

## 2025-07-24 PROCEDURE — 82951 GLUCOSE TOLERANCE TEST (GTT): CPT | Mod: ZL

## 2025-08-08 ENCOUNTER — HOSPITAL ENCOUNTER (OUTPATIENT)
Dept: ULTRASOUND IMAGING | Facility: OTHER | Age: 35
Discharge: HOME | End: 2025-08-08
Payer: COMMERCIAL

## 2025-08-08 DIAGNOSIS — O24.410 DIET CONTROLLED GESTATIONAL DIABETES MELLITUS (GDM), ANTEPARTUM: ICD-10-CM

## 2025-08-08 PROCEDURE — 76816 OB US FOLLOW-UP PER FETUS: CPT

## 2025-08-08 PROCEDURE — 76816 OB US FOLLOW-UP PER FETUS: CPT | Mod: 26 | Performed by: STUDENT IN AN ORGANIZED HEALTH CARE EDUCATION/TRAINING PROGRAM

## 2025-08-16 DIAGNOSIS — O24.419 GDM, CLASS A2: Primary | ICD-10-CM

## 2025-08-22 ENCOUNTER — HOSPITAL ENCOUNTER (OUTPATIENT)
Dept: ULTRASOUND IMAGING | Facility: OTHER | Age: 35
Discharge: HOME | End: 2025-08-22
Attending: OBSTETRICS & GYNECOLOGY
Payer: COMMERCIAL

## 2025-08-22 DIAGNOSIS — O24.419 GDM, CLASS A2: ICD-10-CM

## 2025-08-22 PROCEDURE — 76819 FETAL BIOPHYS PROFIL W/O NST: CPT

## 2025-08-22 PROCEDURE — 76819 FETAL BIOPHYS PROFIL W/O NST: CPT | Mod: 26 | Performed by: RADIOLOGY

## 2025-08-24 ENCOUNTER — TELEPHONE (OUTPATIENT)
Dept: OBGYN | Facility: OTHER | Age: 35
End: 2025-08-24
Payer: COMMERCIAL

## 2025-08-24 DIAGNOSIS — O24.419 GDM, CLASS A2: Primary | ICD-10-CM

## 2025-08-25 RX ORDER — PEN NEEDLE, DIABETIC 32GX 5/32"
NEEDLE, DISPOSABLE MISCELLANEOUS
Qty: 100 EACH | Refills: 0 | Status: SHIPPED | OUTPATIENT
Start: 2025-08-25

## 2025-08-27 ENCOUNTER — HOSPITAL ENCOUNTER (OUTPATIENT)
Dept: ULTRASOUND IMAGING | Facility: OTHER | Age: 35
Discharge: HOME | End: 2025-08-27
Attending: OBSTETRICS & GYNECOLOGY
Payer: COMMERCIAL

## 2025-08-27 DIAGNOSIS — O24.419 GDM, CLASS A2: ICD-10-CM

## 2025-08-27 PROCEDURE — 76819 FETAL BIOPHYS PROFIL W/O NST: CPT

## 2025-08-27 PROCEDURE — 76819 FETAL BIOPHYS PROFIL W/O NST: CPT | Mod: 26 | Performed by: RADIOLOGY

## (undated) RX ORDER — LIDOCAINE HCL/EPINEPHRINE/PF 2%-1:200K
VIAL (ML) INJECTION
Status: DISPENSED
Start: 2024-02-02

## (undated) RX ORDER — FENTANYL CITRATE 50 UG/ML
INJECTION, SOLUTION INTRAMUSCULAR; INTRAVENOUS
Status: DISPENSED
Start: 2019-03-29

## (undated) RX ORDER — LIDOCAINE HYDROCHLORIDE 10 MG/ML
INJECTION, SOLUTION INFILTRATION; PERINEURAL
Status: DISPENSED
Start: 2024-02-02

## (undated) RX ORDER — MORPHINE SULFATE 1 MG/ML
INJECTION, SOLUTION EPIDURAL; INTRATHECAL; INTRAVENOUS
Status: DISPENSED
Start: 2019-03-29